# Patient Record
Sex: FEMALE | Race: WHITE | NOT HISPANIC OR LATINO
[De-identification: names, ages, dates, MRNs, and addresses within clinical notes are randomized per-mention and may not be internally consistent; named-entity substitution may affect disease eponyms.]

---

## 2017-01-05 ENCOUNTER — CHARTING TRANS (OUTPATIENT)
Dept: OTHER | Age: 47
End: 2017-01-05

## 2017-02-01 ENCOUNTER — CHARTING TRANS (OUTPATIENT)
Dept: OTHER | Age: 47
End: 2017-02-01

## 2017-03-09 ENCOUNTER — CHARTING TRANS (OUTPATIENT)
Dept: OTHER | Age: 47
End: 2017-03-09

## 2017-03-10 ENCOUNTER — CHARTING TRANS (OUTPATIENT)
Dept: OTHER | Age: 47
End: 2017-03-10

## 2017-05-18 ENCOUNTER — CHARTING TRANS (OUTPATIENT)
Dept: OTHER | Age: 47
End: 2017-05-18

## 2017-06-06 ENCOUNTER — LAB SERVICES (OUTPATIENT)
Dept: OTHER | Age: 47
End: 2017-06-06

## 2017-06-06 ENCOUNTER — CHARTING TRANS (OUTPATIENT)
Dept: OTHER | Age: 47
End: 2017-06-06

## 2017-06-06 LAB
ALBUMIN SERPL BCG-MCNC: 4.3 G/DL (ref 3.6–5.1)
ALP SERPL-CCNC: 65 U/L (ref 45–105)
ALT SERPL W/O P-5'-P-CCNC: 42 U/L (ref 7–34)
AST SERPL-CCNC: 33 U/L (ref 9–37)
BILIRUB SERPL-MCNC: 0.3 MG/DL (ref 0–1)
BUN SERPL-MCNC: 13 MG/DL (ref 7–20)
CALCIUM SERPL-MCNC: 9.4 MG/DL (ref 8.6–10.6)
CHLORIDE SERPL-SCNC: 105 MMOL/L (ref 96–107)
CHOLEST SERPL-MCNC: 177 MG/DL (ref 140–200)
CREATININE, SERUM: 0.8 MG/DL (ref 0.5–1.4)
DIFFERENTIAL TYPE: NORMAL
GFR SERPL CREATININE-BSD FRML MDRD: >60 ML/MIN/{1.73M2}
GFR SERPL CREATININE-BSD FRML MDRD: >60 ML/MIN/{1.73M2}
GLUCOSE SERPL-MCNC: 88 MG/DL (ref 70–200)
HCO3 SERPL-SCNC: 25 MMOL/L (ref 22–32)
HDLC SERPL-MCNC: 66 MG/DL
HEMATOCRIT: 37.4 % (ref 34–45)
HEMOGLOBIN: 12.8 G/DL (ref 11.2–15.7)
LDLC SERPL CALC-MCNC: 92 MG/DL (ref 0–100)
LYMPH PERCENT: 34.9 % (ref 20.5–51.1)
LYMPHOCYTE ABSOLUTE #: 1.7 10*3/UL (ref 1.2–3.4)
MEAN CORPUSCULAR HGB CONCENTRATION: 34.2 % (ref 32–36)
MEAN CORPUSCULAR HGB: 31.4 PG (ref 27–34)
MEAN CORPUSCULAR VOLUME: 91.7 FL (ref 79–95)
MEAN PLATELET VOLUME: 8.9 FL (ref 8.6–12.4)
MIXED %: 7.6 % (ref 4.3–12.9)
MIXED ABSOLUTE #: 0.4 10*3/UL (ref 0.2–0.9)
NEUTROPHIL ABSOLUTE #: 2.8 10*3/UL (ref 1.4–6.5)
NEUTROPHIL PERCENT: 57.5 % (ref 34–73.5)
PLATELET COUNT: 226 10*3/UL (ref 150–400)
POTASSIUM SERPL-SCNC: 4.1 MMOL/L (ref 3.5–5.3)
PROT SERPL-MCNC: 7.5 G/DL (ref 6.2–8.1)
RED BLOOD CELL COUNT: 4.08 10*6/UL (ref 3.7–5.2)
RED CELL DISTRIBUTION WIDTH: 13 % (ref 11.3–14.8)
SODIUM SERPL-SCNC: 141 MMOL/L (ref 136–146)
T3FREE SERPL-MCNC: 3 PG/ML (ref 2.8–5.3)
T4 FREE SERPL-MCNC: 1.13 NG/DL (ref 0.78–2.19)
TRIGL SERPL-MCNC: 93 MG/DL (ref 0–200)
TSH SERPL-ACNC: 2.15 M[IU]/L (ref 0.3–4.82)
WHITE BLOOD CELL COUNT: 4.9 10*3/UL (ref 4–10)

## 2017-06-07 ENCOUNTER — CHARTING TRANS (OUTPATIENT)
Dept: OTHER | Age: 47
End: 2017-06-07

## 2017-06-08 ENCOUNTER — CHARTING TRANS (OUTPATIENT)
Dept: OTHER | Age: 47
End: 2017-06-08

## 2017-07-03 ENCOUNTER — IMAGING SERVICES (OUTPATIENT)
Dept: OTHER | Age: 47
End: 2017-07-03

## 2017-07-11 ENCOUNTER — CHARTING TRANS (OUTPATIENT)
Dept: OTHER | Age: 47
End: 2017-07-11

## 2017-07-11 ENCOUNTER — MYAURORA ACCOUNT LINK (OUTPATIENT)
Dept: OTHER | Age: 47
End: 2017-07-11

## 2017-07-11 ASSESSMENT — PAIN SCALES - GENERAL: PAINLEVEL_OUTOF10: 8

## 2017-07-26 ENCOUNTER — CHARTING TRANS (OUTPATIENT)
Dept: OTHER | Age: 47
End: 2017-07-26

## 2017-07-31 ENCOUNTER — CHARTING TRANS (OUTPATIENT)
Dept: RHEUMATOLOGY | Age: 47
End: 2017-07-31

## 2017-07-31 ENCOUNTER — CHARTING TRANS (OUTPATIENT)
Dept: OTHER | Age: 47
End: 2017-07-31

## 2017-08-02 ENCOUNTER — LAB SERVICES (OUTPATIENT)
Dept: OTHER | Age: 47
End: 2017-08-02

## 2017-08-02 ENCOUNTER — CHARTING TRANS (OUTPATIENT)
Dept: OTHER | Age: 47
End: 2017-08-02

## 2017-08-02 LAB
25(OH)D3 SERPL-MCNC: 20.9 NG/ML (ref 30–100)
CK SERPL-CCNC: 150 U/L (ref 30–135)

## 2017-08-07 ENCOUNTER — CHARTING TRANS (OUTPATIENT)
Dept: OTHER | Age: 47
End: 2017-08-07

## 2017-08-08 ENCOUNTER — CHARTING TRANS (OUTPATIENT)
Dept: OTHER | Age: 47
End: 2017-08-08

## 2017-08-17 ENCOUNTER — CHARTING TRANS (OUTPATIENT)
Dept: OTHER | Age: 47
End: 2017-08-17

## 2017-08-30 ENCOUNTER — CHARTING TRANS (OUTPATIENT)
Dept: OTHER | Age: 47
End: 2017-08-30

## 2017-09-05 ENCOUNTER — CHARTING TRANS (OUTPATIENT)
Dept: OTHER | Age: 47
End: 2017-09-05

## 2017-09-18 ENCOUNTER — CHARTING TRANS (OUTPATIENT)
Dept: INTERNAL MEDICINE | Age: 47
End: 2017-09-18

## 2017-09-18 ENCOUNTER — IMAGING SERVICES (OUTPATIENT)
Dept: OTHER | Age: 47
End: 2017-09-18

## 2017-09-19 ENCOUNTER — CHARTING TRANS (OUTPATIENT)
Dept: OTHER | Age: 47
End: 2017-09-19

## 2017-10-03 ENCOUNTER — LAB SERVICES (OUTPATIENT)
Dept: OTHER | Age: 47
End: 2017-10-03

## 2017-10-03 ENCOUNTER — CHARTING TRANS (OUTPATIENT)
Dept: OTHER | Age: 47
End: 2017-10-03

## 2017-10-03 LAB
25(OH)D3 SERPL-MCNC: 28.9 NG/ML (ref 30–100)
CK SERPL-CCNC: 136 U/L (ref 30–135)

## 2017-10-05 ENCOUNTER — CHARTING TRANS (OUTPATIENT)
Dept: RHEUMATOLOGY | Age: 47
End: 2017-10-05

## 2017-10-05 ENCOUNTER — MYAURORA ACCOUNT LINK (OUTPATIENT)
Dept: OTHER | Age: 47
End: 2017-10-05

## 2017-10-06 ENCOUNTER — CHARTING TRANS (OUTPATIENT)
Dept: OTHER | Age: 47
End: 2017-10-06

## 2017-10-18 ENCOUNTER — CHARTING TRANS (OUTPATIENT)
Dept: OTHER | Age: 47
End: 2017-10-18

## 2017-10-25 ENCOUNTER — CHARTING TRANS (OUTPATIENT)
Dept: OTHER | Age: 47
End: 2017-10-25

## 2017-10-31 ENCOUNTER — MYAURORA ACCOUNT LINK (OUTPATIENT)
Dept: OTHER | Age: 47
End: 2017-10-31

## 2017-10-31 ENCOUNTER — LAB SERVICES (OUTPATIENT)
Dept: OTHER | Age: 47
End: 2017-10-31

## 2017-10-31 ENCOUNTER — CHARTING TRANS (OUTPATIENT)
Dept: OTHER | Age: 47
End: 2017-10-31

## 2017-10-31 LAB
ALBUMIN SERPL BCG-MCNC: 4.4 G/DL (ref 3.6–5.1)
ALBUMIN SERPL BCG-MCNC: 4.4 G/DL (ref 3.6–5.1)
ALP SERPL-CCNC: 75 U/L (ref 45–105)
ALP SERPL-CCNC: 75 U/L (ref 45–105)
ALT SERPL W/O P-5'-P-CCNC: 24 U/L (ref 7–34)
ALT SERPL W/O P-5'-P-CCNC: 24 U/L (ref 7–34)
AST SERPL-CCNC: 15 U/L (ref 9–37)
AST SERPL-CCNC: 15 U/L (ref 9–37)
BILIRUB DIRECT SERPL-MCNC: 0.1 MG/DL (ref 0–0.2)
BILIRUB SERPL-MCNC: 0.2 MG/DL (ref 0–1)
BILIRUB SERPL-MCNC: 0.2 MG/DL (ref 0–1)
BUN SERPL-MCNC: 11 MG/DL (ref 7–20)
CALCIUM SERPL-MCNC: 9.4 MG/DL (ref 8.6–10.6)
CHLORIDE SERPL-SCNC: 105 MMOL/L (ref 96–107)
CREATININE, SERUM: 0.8 MG/DL (ref 0.5–1.4)
GFR SERPL CREATININE-BSD FRML MDRD: >60 ML/MIN/{1.73M2}
GFR SERPL CREATININE-BSD FRML MDRD: >60 ML/MIN/{1.73M2}
GLUCOSE SERPL-MCNC: 96 MG/DL (ref 70–200)
HCO3 SERPL-SCNC: 25 MMOL/L (ref 22–32)
POTASSIUM SERPL-SCNC: 4.2 MMOL/L (ref 3.5–5.3)
PROT SERPL-MCNC: 6.9 G/DL (ref 6.2–8.1)
PROT SERPL-MCNC: 6.9 G/DL (ref 6.2–8.1)
SODIUM SERPL-SCNC: 140 MMOL/L (ref 136–146)

## 2017-11-01 ENCOUNTER — CHARTING TRANS (OUTPATIENT)
Dept: OTHER | Age: 47
End: 2017-11-01

## 2017-11-01 LAB — FINAL REPORT: NORMAL

## 2017-11-02 ENCOUNTER — CHARTING TRANS (OUTPATIENT)
Dept: OTHER | Age: 47
End: 2017-11-02

## 2017-12-20 ENCOUNTER — CHARTING TRANS (OUTPATIENT)
Dept: OTHER | Age: 47
End: 2017-12-20

## 2018-01-11 ENCOUNTER — IMAGING SERVICES (OUTPATIENT)
Dept: OTHER | Age: 48
End: 2018-01-11

## 2018-01-11 ENCOUNTER — CHARTING TRANS (OUTPATIENT)
Dept: OTHER | Age: 48
End: 2018-01-11

## 2018-01-11 ENCOUNTER — LAB SERVICES (OUTPATIENT)
Dept: OTHER | Age: 48
End: 2018-01-11

## 2018-01-11 LAB
ALBUMIN SERPL BCG-MCNC: 4.4 G/DL (ref 3.6–5.1)
ALP SERPL-CCNC: 75 U/L (ref 45–105)
ALT SERPL W/O P-5'-P-CCNC: 47 U/L (ref 7–34)
AST SERPL-CCNC: 32 U/L (ref 9–37)
BILIRUB SERPL-MCNC: 0.3 MG/DL (ref 0–1)
BUN SERPL-MCNC: 13 MG/DL (ref 7–20)
CALCIUM SERPL-MCNC: 9.2 MG/DL (ref 8.6–10.6)
CHLORIDE SERPL-SCNC: 103 MMOL/L (ref 96–107)
CHOLEST SERPL-MCNC: 203 MG/DL (ref 140–200)
CREATININE, SERUM: 0.7 MG/DL (ref 0.5–1.4)
DIFFERENTIAL TYPE: NORMAL
GFR SERPL CREATININE-BSD FRML MDRD: >60 ML/MIN/{1.73M2}
GFR SERPL CREATININE-BSD FRML MDRD: >60 ML/MIN/{1.73M2}
GLUCOSE SERPL-MCNC: 98 MG/DL (ref 70–200)
HCO3 SERPL-SCNC: 24 MMOL/L (ref 22–32)
HDLC SERPL-MCNC: 71 MG/DL
HEMATOCRIT: 39.7 % (ref 34–45)
HEMOGLOBIN: 13.4 G/DL (ref 11.2–15.7)
LDLC SERPL CALC-MCNC: 112 MG/DL (ref 0–100)
LYMPH PERCENT: 36.9 % (ref 20.5–51.1)
LYMPHOCYTE ABSOLUTE #: 2.1 10*3/UL (ref 1.2–3.4)
MEAN CORPUSCULAR HGB CONCENTRATION: 33.8 % (ref 32–36)
MEAN CORPUSCULAR HGB: 30.9 PG (ref 27–34)
MEAN CORPUSCULAR VOLUME: 91.5 FL (ref 79–95)
MEAN PLATELET VOLUME: 9 FL (ref 8.6–12.4)
MIXED %: 7.2 % (ref 4.3–12.9)
MIXED ABSOLUTE #: 0.4 10*3/UL (ref 0.2–0.9)
NEUTROPHIL ABSOLUTE #: 3.2 10*3/UL (ref 1.4–6.5)
NEUTROPHIL PERCENT: 55.9 % (ref 34–73.5)
PLATELET COUNT: 266 10*3/UL (ref 150–400)
POTASSIUM SERPL-SCNC: 4 MMOL/L (ref 3.5–5.3)
PROT SERPL-MCNC: 7.7 G/DL (ref 6.2–8.1)
RED BLOOD CELL COUNT: 4.34 10*6/UL (ref 3.7–5.2)
RED CELL DISTRIBUTION WIDTH: 12.7 % (ref 11.3–14.8)
SODIUM SERPL-SCNC: 139 MMOL/L (ref 136–146)
TRIGL SERPL-MCNC: 97 MG/DL (ref 0–200)
WHITE BLOOD CELL COUNT: 5.7 10*3/UL (ref 4–10)

## 2018-01-12 ENCOUNTER — CHARTING TRANS (OUTPATIENT)
Dept: OTHER | Age: 48
End: 2018-01-12

## 2018-03-06 ENCOUNTER — CHARTING TRANS (OUTPATIENT)
Dept: OTHER | Age: 48
End: 2018-03-06

## 2018-04-11 ENCOUNTER — CHARTING TRANS (OUTPATIENT)
Dept: OTHER | Age: 48
End: 2018-04-11

## 2018-06-12 ENCOUNTER — CHARTING TRANS (OUTPATIENT)
Dept: OTHER | Age: 48
End: 2018-06-12

## 2018-06-12 ENCOUNTER — MYAURORA ACCOUNT LINK (OUTPATIENT)
Dept: OTHER | Age: 48
End: 2018-06-12

## 2018-06-12 ENCOUNTER — ANCILLARY ORDERS (OUTPATIENT)
Dept: OTHER | Age: 48
End: 2018-06-12

## 2018-06-12 DIAGNOSIS — Z12.31 ENCOUNTER FOR SCREENING MAMMOGRAM FOR MALIGNANT NEOPLASM OF BREAST: ICD-10-CM

## 2018-07-05 ENCOUNTER — IMAGING SERVICES (OUTPATIENT)
Dept: OTHER | Age: 48
End: 2018-07-05

## 2018-07-09 ENCOUNTER — CHARTING TRANS (OUTPATIENT)
Dept: OTHER | Age: 48
End: 2018-07-09

## 2018-07-09 ENCOUNTER — LAB SERVICES (OUTPATIENT)
Dept: OTHER | Age: 48
End: 2018-07-09

## 2018-07-09 ENCOUNTER — ANCILLARY ORDERS (OUTPATIENT)
Dept: OTHER | Age: 48
End: 2018-07-09

## 2018-07-09 ENCOUNTER — MYAURORA ACCOUNT LINK (OUTPATIENT)
Dept: OTHER | Age: 48
End: 2018-07-09

## 2018-07-09 DIAGNOSIS — D05.11 INTRADUCTAL CARCINOMA IN SITU OF RIGHT BREAST: ICD-10-CM

## 2018-07-09 DIAGNOSIS — Z12.31 ENCOUNTER FOR SCREENING MAMMOGRAM FOR MALIGNANT NEOPLASM OF BREAST: ICD-10-CM

## 2018-07-09 LAB
ALBUMIN SERPL BCG-MCNC: 4.4 G/DL (ref 3.6–5.1)
ALP SERPL-CCNC: 79 U/L (ref 45–130)
ALT SERPL W/O P-5'-P-CCNC: 55 U/L (ref 7–34)
AST SERPL-CCNC: 30 U/L (ref 9–37)
BILIRUB SERPL-MCNC: 0.3 MG/DL (ref 0–1)
BUN SERPL-MCNC: 11 MG/DL (ref 7–20)
CALCIUM SERPL-MCNC: 9.6 MG/DL (ref 8.6–10.6)
CHLORIDE SERPL-SCNC: 104 MMOL/L (ref 96–107)
CHOLEST SERPL-MCNC: 171 MG/DL (ref 140–200)
CREAT SERPL-MCNC: 0.8 MG/DL (ref 0.5–1.4)
DIFFERENTIAL TYPE: NORMAL
GFR SERPL CREATININE-BSD FRML MDRD: >60 ML/MIN/{1.73M2}
GFR SERPL CREATININE-BSD FRML MDRD: >60 ML/MIN/{1.73M2}
GLUCOSE SERPL-MCNC: 91 MG/DL (ref 70–200)
HCO3 SERPL-SCNC: 29 MMOL/L (ref 22–32)
HDLC SERPL-MCNC: 68 MG/DL
HEMATOCRIT: 39.9 % (ref 34–45)
HEMOGLOBIN: 13.1 G/DL (ref 11.2–15.7)
LDLC SERPL CALC-MCNC: 86 MG/DL (ref 0–100)
LYMPH PERCENT: 39.1 % (ref 20.5–51.1)
LYMPHOCYTE ABSOLUTE #: 2 10*3/UL (ref 1.2–3.4)
MEAN CORPUSCULAR HGB CONCENTRATION: 32.8 % (ref 32–36)
MEAN CORPUSCULAR HGB: 30.6 PG (ref 27–34)
MEAN CORPUSCULAR VOLUME: 93.2 FL (ref 79–95)
MEAN PLATELET VOLUME: 9.2 FL (ref 8.6–12.4)
MIXED %: 7.2 % (ref 4.3–12.9)
MIXED ABSOLUTE #: 0.4 10*3/UL (ref 0.2–0.9)
NEUTROPHIL ABSOLUTE #: 2.7 10*3/UL (ref 1.4–6.5)
NEUTROPHIL PERCENT: 53.7 % (ref 34–73.5)
PLATELET COUNT: 229 10*3/UL (ref 150–400)
POTASSIUM SERPL-SCNC: 4.4 MMOL/L (ref 3.5–5.3)
PROT SERPL-MCNC: 7.1 G/DL (ref 6.2–8.1)
RED BLOOD CELL COUNT: 4.28 10*6/UL (ref 3.7–5.2)
RED CELL DISTRIBUTION WIDTH: 13.9 % (ref 11.3–14.8)
SODIUM SERPL-SCNC: 142 MMOL/L (ref 136–146)
TRIGL SERPL-MCNC: 86 MG/DL (ref 0–200)
WHITE BLOOD CELL COUNT: 5.1 10*3/UL (ref 4–10)

## 2018-07-10 ENCOUNTER — CHARTING TRANS (OUTPATIENT)
Dept: OTHER | Age: 48
End: 2018-07-10

## 2018-07-19 ENCOUNTER — IMAGING SERVICES (OUTPATIENT)
Dept: OTHER | Age: 48
End: 2018-07-19

## 2018-07-25 ENCOUNTER — LAB SERVICES (OUTPATIENT)
Dept: OTHER | Age: 48
End: 2018-07-25

## 2018-07-25 ENCOUNTER — CHARTING TRANS (OUTPATIENT)
Dept: OTHER | Age: 48
End: 2018-07-25

## 2018-07-25 LAB
ALBUMIN SERPL-MCNC: 4.7 G/DL (ref 3.6–5.1)
ALP SERPL-CCNC: 90 U/L (ref 45–130)
ALT SERPL-CCNC: 57 U/L (ref 15–43)
AST SERPL-CCNC: 40 U/L (ref 14–43)
BILIRUB SERPL-MCNC: 0.4 MG/DL (ref 0–1.3)
BUN SERPL-MCNC: 15 MG/DL (ref 7–20)
CALCIUM SERPL-MCNC: 10.3 MG/DL (ref 8.6–10.6)
CHLORIDE SERPL-SCNC: 104 MMOL/L (ref 96–107)
CO2 SERPL-SCNC: 27 MMOL/L (ref 22–32)
CREAT SERPL-MCNC: 0.8 MG/DL (ref 0.5–1.4)
DIFFERENTIAL TYPE: ABNORMAL
GFR SERPL CREATININE-BSD FRML MDRD: >60 ML/MIN/{1.73M2}
GFR SERPL CREATININE-BSD FRML MDRD: >60 ML/MIN/{1.73M2}
GLUCOSE SERPL-MCNC: 109 MG/DL (ref 70–200)
HEMATOCRIT: 44.7 % (ref 34–45)
HEMOGLOBIN: 14.4 G/DL (ref 11.2–15.7)
MEAN CORPUSCULAR HGB CONCENTRATION: 32.2 % (ref 32–36)
MEAN CORPUSCULAR HGB: 29.4 PG (ref 27–34)
MEAN CORPUSCULAR VOLUME: 91.2 FL (ref 79–95)
MEAN PLATELET VOLUME: 9.8 FL (ref 8.6–12.4)
PLATELET COUNT: 288 10*3/UL (ref 150–400)
POTASSIUM SERPL-SCNC: 4.4 MMOL/L (ref 3.5–5.3)
PROT SERPL-MCNC: 7.9 G/DL (ref 6.4–8.5)
RED BLOOD CELL COUNT: 4.9 10*6/UL (ref 3.7–5.2)
RED CELL DISTRIBUTION WIDTH: 13.9 % (ref 11.3–14.8)
SODIUM SERPL-SCNC: 146 MMOL/L (ref 136–146)
WHITE BLOOD CELL COUNT: 12 10*3/UL (ref 4–10)

## 2018-07-26 ENCOUNTER — CHARTING TRANS (OUTPATIENT)
Dept: OTHER | Age: 48
End: 2018-07-26

## 2018-07-26 ENCOUNTER — MYAURORA ACCOUNT LINK (OUTPATIENT)
Dept: OTHER | Age: 48
End: 2018-07-26

## 2018-08-03 ENCOUNTER — CHARTING TRANS (OUTPATIENT)
Dept: OTHER | Age: 48
End: 2018-08-03

## 2018-09-06 ENCOUNTER — CHARTING TRANS (OUTPATIENT)
Dept: OTHER | Age: 48
End: 2018-09-06

## 2018-09-10 ENCOUNTER — CHARTING TRANS (OUTPATIENT)
Dept: OTHER | Age: 48
End: 2018-09-10

## 2018-09-14 ENCOUNTER — LAB SERVICES (OUTPATIENT)
Dept: OTHER | Age: 48
End: 2018-09-14

## 2018-09-14 LAB — HEMOCCULT STL QL IA: NEGATIVE

## 2018-09-17 LAB
C PARVUM AG STL QL IA: NEGATIVE
G LAMBLIA AG STL QL IA: NEGATIVE

## 2018-09-18 LAB — FINAL REPORT: NORMAL

## 2018-11-23 ENCOUNTER — IMAGING SERVICES (OUTPATIENT)
Dept: OTHER | Age: 48
End: 2018-11-23

## 2018-11-28 VITALS
HEART RATE: 72 BPM | RESPIRATION RATE: 18 BRPM | WEIGHT: 216 LBS | TEMPERATURE: 98 F | OXYGEN SATURATION: 97 % | DIASTOLIC BLOOD PRESSURE: 66 MMHG | SYSTOLIC BLOOD PRESSURE: 110 MMHG | BODY MASS INDEX: 31.9 KG/M2

## 2018-11-28 VITALS
DIASTOLIC BLOOD PRESSURE: 82 MMHG | WEIGHT: 218 LBS | HEIGHT: 69 IN | SYSTOLIC BLOOD PRESSURE: 130 MMHG | HEART RATE: 75 BPM | OXYGEN SATURATION: 98 % | TEMPERATURE: 97 F | BODY MASS INDEX: 32.29 KG/M2 | RESPIRATION RATE: 18 BRPM

## 2018-11-28 VITALS
DIASTOLIC BLOOD PRESSURE: 76 MMHG | SYSTOLIC BLOOD PRESSURE: 118 MMHG | WEIGHT: 220 LBS | HEIGHT: 69 IN | BODY MASS INDEX: 32.58 KG/M2 | HEART RATE: 88 BPM | RESPIRATION RATE: 18 BRPM

## 2018-11-28 VITALS
HEIGHT: 69 IN | BODY MASS INDEX: 31.55 KG/M2 | WEIGHT: 213 LBS | RESPIRATION RATE: 18 BRPM | HEART RATE: 76 BPM | DIASTOLIC BLOOD PRESSURE: 76 MMHG | SYSTOLIC BLOOD PRESSURE: 118 MMHG

## 2018-11-28 VITALS
SYSTOLIC BLOOD PRESSURE: 120 MMHG | TEMPERATURE: 97 F | DIASTOLIC BLOOD PRESSURE: 80 MMHG | HEART RATE: 81 BPM | OXYGEN SATURATION: 99 % | RESPIRATION RATE: 16 BRPM

## 2018-12-05 RX ORDER — OMEGA-3/DHA/EPA/FISH OIL 60 MG-90MG
CAPSULE ORAL
COMMUNITY
Start: 2009-07-16 | End: 2022-02-22 | Stop reason: ALTCHOICE

## 2018-12-05 RX ORDER — ATENOLOL 25 MG/1
12.5 TABLET ORAL
COMMUNITY
Start: 2018-07-26 | End: 2018-12-06 | Stop reason: SDUPTHER

## 2018-12-05 RX ORDER — CYCLOBENZAPRINE HCL 10 MG
5 TABLET ORAL
COMMUNITY
Start: 2017-07-31 | End: 2018-12-06 | Stop reason: SDUPTHER

## 2018-12-05 RX ORDER — MULTIVIT-MIN/IRON/FOLIC ACID/K 18-600-40
CAPSULE ORAL
COMMUNITY
Start: 2017-10-13

## 2018-12-05 RX ORDER — CETIRIZINE HYDROCHLORIDE 10 MG/1
10 TABLET ORAL DAILY
COMMUNITY
Start: 2014-03-18

## 2018-12-06 ENCOUNTER — LAB SERVICES (OUTPATIENT)
Dept: LAB | Age: 48
End: 2018-12-06

## 2018-12-06 ENCOUNTER — TELEPHONE (OUTPATIENT)
Dept: GASTROENTEROLOGY | Age: 48
End: 2018-12-06

## 2018-12-06 ENCOUNTER — OFFICE VISIT (OUTPATIENT)
Dept: INTERNAL MEDICINE | Age: 48
End: 2018-12-06

## 2018-12-06 VITALS
SYSTOLIC BLOOD PRESSURE: 108 MMHG | DIASTOLIC BLOOD PRESSURE: 80 MMHG | BODY MASS INDEX: 31.31 KG/M2 | RESPIRATION RATE: 20 BRPM | WEIGHT: 212 LBS | HEART RATE: 80 BPM

## 2018-12-06 DIAGNOSIS — K90.9 DIARRHEA DUE TO MALABSORPTION: Primary | ICD-10-CM

## 2018-12-06 DIAGNOSIS — J45.20 MILD INTERMITTENT ASTHMA WITHOUT STATUS ASTHMATICUS WITHOUT COMPLICATION: ICD-10-CM

## 2018-12-06 DIAGNOSIS — J30.1 NON-SEASONAL ALLERGIC RHINITIS DUE TO POLLEN: Chronic | ICD-10-CM

## 2018-12-06 DIAGNOSIS — K90.9 DIARRHEA DUE TO MALABSORPTION: ICD-10-CM

## 2018-12-06 DIAGNOSIS — I10 ESSENTIAL HYPERTENSION: ICD-10-CM

## 2018-12-06 DIAGNOSIS — R19.7 DIARRHEA DUE TO MALABSORPTION: ICD-10-CM

## 2018-12-06 DIAGNOSIS — G57.62 MORTON'S NEUROMA OF LEFT FOOT: ICD-10-CM

## 2018-12-06 DIAGNOSIS — R04.0 EPISTAXIS: ICD-10-CM

## 2018-12-06 DIAGNOSIS — M79.10 MYALGIA: ICD-10-CM

## 2018-12-06 DIAGNOSIS — J31.0 CHRONIC RHINITIS: Chronic | ICD-10-CM

## 2018-12-06 DIAGNOSIS — D05.10 DUCTAL CARCINOMA IN SITU (DCIS) OF BREAST, UNSPECIFIED LATERALITY: ICD-10-CM

## 2018-12-06 DIAGNOSIS — N39.3 STRESS INCONTINENCE, FEMALE: ICD-10-CM

## 2018-12-06 DIAGNOSIS — R19.7 DIARRHEA DUE TO MALABSORPTION: Primary | ICD-10-CM

## 2018-12-06 LAB
25(OH)D3 SERPL-MCNC: 22.8 NG/ML (ref 30–100)
ALBUMIN SERPL-MCNC: 4.6 G/DL (ref 3.6–5.1)
ALP SERPL-CCNC: 96 U/L (ref 45–130)
ALT SERPL W/O P-5'-P-CCNC: 46 U/L (ref 4–38)
AMYLASE SERPL-CCNC: 32 U/L (ref 30–110)
AST SERPL-CCNC: 33 U/L (ref 14–43)
BASOPHIL %: 0.6 % (ref 0–1.2)
BASOPHIL ABSOLUTE #: 0 10*3/UL (ref 0–0.1)
BILIRUB SERPL-MCNC: 0.5 MG/DL (ref 0–1.3)
BILIRUBIN URINE: NEGATIVE
BLOOD URINE: NEGATIVE
BUN SERPL-MCNC: 12 MG/DL (ref 7–20)
CALCIUM SERPL-MCNC: 10.7 MG/DL (ref 8.6–10.6)
CHLORIDE SERPL-SCNC: 101 MMOL/L (ref 96–107)
CHOLEST SERPL-MCNC: 189 MG/DL (ref 140–200)
CK SERPL-CCNC: 92 U/L (ref 30–135)
CLARITY: ABNORMAL
CO2 SERPL-SCNC: 29 MMOL/L (ref 22–32)
COLOR: YELLOW
CREAT SERPL-MCNC: 0.7 MG/DL (ref 0.5–1.4)
DIFFERENTIAL TYPE: NORMAL
EOSINOPHIL %: 0.7 % (ref 0–10)
EOSINOPHIL ABSOLUTE #: 0.1 10*3/UL (ref 0–0.5)
GFR SERPL CREATININE-BSD FRML MDRD: >60 ML/MIN/{1.73M2}
GFR SERPL CREATININE-BSD FRML MDRD: >60 ML/MIN/{1.73M2}
GLUCOSE P FAST SERPL-MCNC: 88 MG/DL (ref 60–100)
GLUCOSE QUALITATIVE U: NEGATIVE
HDLC SERPL-MCNC: 74 MG/DL
HEMATOCRIT: 40.1 % (ref 34–45)
HEMOGLOBIN: 13.3 G/DL (ref 11.2–15.7)
KETONES, URINE: NEGATIVE
LDLC SERPL CALC-MCNC: 92 MG/DL (ref 30–100)
LEUKOCYTE ESTERASE URINE: NEGATIVE
LIPASE SERPL-CCNC: 81 U/L (ref 10–250)
LYMPH PERCENT: 31.9 % (ref 20.5–51.1)
LYMPHOCYTE ABSOLUTE #: 2.2 10*3/UL (ref 1.2–3.4)
MEAN CORPUSCULAR HGB CONCENTRATION: 33.2 % (ref 32–36)
MEAN CORPUSCULAR HGB: 30.7 PG (ref 27–34)
MEAN CORPUSCULAR VOLUME: 92.6 FL (ref 79–95)
MEAN PLATELET VOLUME: 10.5 FL (ref 8.6–12.4)
MONOCYTE ABSOLUTE #: 0.5 10*3/UL (ref 0.2–0.9)
MONOCYTE PERCENT: 7.2 % (ref 4.3–12.9)
MUCOUS: NORMAL
NEUTROPHIL ABSOLUTE #: 4.1 10*3/UL (ref 1.4–6.5)
NEUTROPHIL PERCENT: 59.6 % (ref 34–73.5)
NITRITE URINE: NEGATIVE
PH URINE: 5 (ref 5–7)
PLATELET COUNT: 251 10*3/UL (ref 150–400)
POTASSIUM SERPL-SCNC: 4.5 MMOL/L (ref 3.5–5.3)
PROT SERPL-MCNC: 8.2 G/DL (ref 6.4–8.5)
RED BLOOD CELL COUNT: 4.33 10*6/UL (ref 3.7–5.2)
RED BLOOD CELLS URINE: NORMAL (ref 0–3)
RED CELL DISTRIBUTION WIDTH: 13.4 % (ref 11.3–14.8)
SODIUM SERPL-SCNC: 139 MMOL/L (ref 136–146)
SPECIFIC GRAVITY URINE: 1.02 (ref 1–1.03)
SQUAMOUS EPITHELIAL CELLS: NORMAL
TRIGL SERPL-MCNC: 114 MG/DL (ref 0–200)
TSH SERPL DL<=0.05 MIU/L-ACNC: 1.52 M[IU]/L (ref 0.3–4.82)
URINE PROTEIN, QUAL (DIPSTICK): 30
UROBILINOGEN URINE: <2
WHITE BLOOD CELL COUNT: 7 10*3/UL (ref 4–10)
WHITE BLOOD CELLS URINE: NORMAL (ref 0–5)

## 2018-12-06 PROCEDURE — 99214 OFFICE O/P EST MOD 30 MIN: CPT | Performed by: INTERNAL MEDICINE

## 2018-12-06 PROCEDURE — 80061 LIPID PANEL: CPT | Performed by: INTERNAL MEDICINE

## 2018-12-06 PROCEDURE — 83690 ASSAY OF LIPASE: CPT | Performed by: INTERNAL MEDICINE

## 2018-12-06 PROCEDURE — 80050 GENERAL HEALTH PANEL: CPT | Performed by: INTERNAL MEDICINE

## 2018-12-06 PROCEDURE — 82150 ASSAY OF AMYLASE: CPT | Performed by: INTERNAL MEDICINE

## 2018-12-06 PROCEDURE — 82306 VITAMIN D 25 HYDROXY: CPT | Performed by: INTERNAL MEDICINE

## 2018-12-06 PROCEDURE — 3079F DIAST BP 80-89 MM HG: CPT | Performed by: INTERNAL MEDICINE

## 2018-12-06 PROCEDURE — 87086 URINE CULTURE/COLONY COUNT: CPT | Performed by: INTERNAL MEDICINE

## 2018-12-06 PROCEDURE — 36415 COLL VENOUS BLD VENIPUNCTURE: CPT | Performed by: INTERNAL MEDICINE

## 2018-12-06 PROCEDURE — 81001 URINALYSIS AUTO W/SCOPE: CPT | Performed by: INTERNAL MEDICINE

## 2018-12-06 PROCEDURE — 3074F SYST BP LT 130 MM HG: CPT | Performed by: INTERNAL MEDICINE

## 2018-12-06 PROCEDURE — 82550 ASSAY OF CK (CPK): CPT | Performed by: INTERNAL MEDICINE

## 2018-12-06 RX ORDER — ATENOLOL 25 MG/1
12.5 TABLET ORAL DAILY
Qty: 45 TABLET | Refills: 1 | Status: SHIPPED | OUTPATIENT
Start: 2018-12-06 | End: 2019-05-16 | Stop reason: SDUPTHER

## 2018-12-06 RX ORDER — CYCLOBENZAPRINE HCL 10 MG
2.5 TABLET ORAL
Qty: 30 TABLET | Refills: 5 | Status: SHIPPED | OUTPATIENT
Start: 2018-12-06 | End: 2020-02-12 | Stop reason: SDUPTHER

## 2018-12-06 SDOH — HEALTH STABILITY: MENTAL HEALTH: HOW OFTEN DO YOU HAVE A DRINK CONTAINING ALCOHOL?: NEVER

## 2018-12-07 LAB — FINAL REPORT: NORMAL

## 2018-12-10 ENCOUNTER — E-ADVICE (OUTPATIENT)
Dept: INTERNAL MEDICINE | Age: 48
End: 2018-12-10

## 2018-12-11 ENCOUNTER — TELEPHONE (OUTPATIENT)
Dept: INTERNAL MEDICINE | Age: 48
End: 2018-12-11

## 2018-12-11 ENCOUNTER — E-ADVICE (OUTPATIENT)
Dept: INTERNAL MEDICINE | Age: 48
End: 2018-12-11

## 2018-12-11 DIAGNOSIS — K31.89 OTHER DISEASES OF STOMACH AND DUODENUM: ICD-10-CM

## 2018-12-11 DIAGNOSIS — R19.4 CHANGE IN BOWEL HABITS: ICD-10-CM

## 2018-12-11 DIAGNOSIS — K31.7 POLYP OF STOMACH AND DUODENUM: ICD-10-CM

## 2018-12-11 DIAGNOSIS — K20.80 OTHER SPECIFIED OESOPHAGITIS: ICD-10-CM

## 2018-12-11 DIAGNOSIS — R10.13 EPIGASTRIC PAIN: ICD-10-CM

## 2018-12-11 DIAGNOSIS — E83.52 HYPERCALCEMIA: Primary | ICD-10-CM

## 2018-12-11 DIAGNOSIS — K29.60 OTHER GASTRITIS WITHOUT BLEEDING: ICD-10-CM

## 2018-12-11 DIAGNOSIS — D12.0 BENIGN NEOPLASM OF CECUM: ICD-10-CM

## 2018-12-12 ENCOUNTER — LAB SERVICES (OUTPATIENT)
Dept: LAB | Age: 48
End: 2018-12-12

## 2018-12-12 DIAGNOSIS — E83.52 HYPERCALCEMIA: Primary | ICD-10-CM

## 2018-12-12 PROCEDURE — 83970 ASSAY OF PARATHORMONE: CPT | Performed by: INTERNAL MEDICINE

## 2018-12-13 LAB
CALCIUM SERPL-MCNC: 10.3 MG/DL (ref 8.6–10.6)
PTH-INTACT SERPL-MCNC: 44.2 PG/ML (ref 23–73)

## 2019-01-28 ENCOUNTER — LAB SERVICES (OUTPATIENT)
Dept: LAB | Age: 49
End: 2019-01-28

## 2019-01-28 ENCOUNTER — OFFICE VISIT (OUTPATIENT)
Dept: GASTROENTEROLOGY | Age: 49
End: 2019-01-28

## 2019-01-28 VITALS
SYSTOLIC BLOOD PRESSURE: 112 MMHG | HEIGHT: 69 IN | WEIGHT: 212 LBS | DIASTOLIC BLOOD PRESSURE: 82 MMHG | BODY MASS INDEX: 31.4 KG/M2

## 2019-01-28 DIAGNOSIS — R79.89 ELEVATED LFTS: ICD-10-CM

## 2019-01-28 DIAGNOSIS — R10.13 EPIGASTRIC PAIN: ICD-10-CM

## 2019-01-28 DIAGNOSIS — R19.4 CHANGE IN BOWEL HABITS: Primary | ICD-10-CM

## 2019-01-28 PROCEDURE — 3079F DIAST BP 80-89 MM HG: CPT | Performed by: INTERNAL MEDICINE

## 2019-01-28 PROCEDURE — 87350 HEPATITIS BE AG IA: CPT | Performed by: INTERNAL MEDICINE

## 2019-01-28 PROCEDURE — 36415 COLL VENOUS BLD VENIPUNCTURE: CPT | Performed by: INTERNAL MEDICINE

## 2019-01-28 PROCEDURE — 3074F SYST BP LT 130 MM HG: CPT | Performed by: INTERNAL MEDICINE

## 2019-01-28 PROCEDURE — 86803 HEPATITIS C AB TEST: CPT | Performed by: INTERNAL MEDICINE

## 2019-01-28 PROCEDURE — 86704 HEP B CORE ANTIBODY TOTAL: CPT | Performed by: INTERNAL MEDICINE

## 2019-01-28 PROCEDURE — 86708 HEPATITIS A ANTIBODY: CPT | Performed by: INTERNAL MEDICINE

## 2019-01-28 PROCEDURE — 99244 OFF/OP CNSLTJ NEW/EST MOD 40: CPT | Performed by: INTERNAL MEDICINE

## 2019-01-28 PROCEDURE — 86706 HEP B SURFACE ANTIBODY: CPT | Performed by: INTERNAL MEDICINE

## 2019-01-28 RX ORDER — SIMETHICONE 125 MG
TABLET,CHEWABLE ORAL
Qty: 2 TABLET | Refills: 0 | Status: SHIPPED | OUTPATIENT
Start: 2019-01-28 | End: 2019-03-14

## 2019-01-28 RX ORDER — BISACODYL 5 MG/1
TABLET, DELAYED RELEASE ORAL
Qty: 2 TABLET | Refills: 0 | Status: SHIPPED | OUTPATIENT
Start: 2019-01-28 | End: 2019-03-14

## 2019-01-29 LAB
HAV IGG+IGM SER QL: NEGATIVE
HBV CORE IGG+IGM SER QL: NEGATIVE
HBV SURFACE AB SER QL: NEGATIVE
HCV AB SER QL: NEGATIVE

## 2019-01-30 LAB — HBV E AG SERPL QL IA: NEGATIVE

## 2019-02-05 ENCOUNTER — TELEPHONE (OUTPATIENT)
Dept: GASTROENTEROLOGY | Age: 49
End: 2019-02-05

## 2019-02-06 ENCOUNTER — APPOINTMENT (OUTPATIENT)
Dept: GASTROENTEROLOGY | Age: 49
End: 2019-02-06
Attending: INTERNAL MEDICINE

## 2019-02-06 DIAGNOSIS — R10.13 ABDOMINAL PAIN, EPIGASTRIC: Primary | ICD-10-CM

## 2019-02-06 DIAGNOSIS — R19.4 CHANGE IN BOWEL HABITS: ICD-10-CM

## 2019-02-06 LAB — PREGNANCY TEST, URINE: NEGATIVE

## 2019-02-06 PROCEDURE — 45380 COLONOSCOPY AND BIOPSY: CPT | Performed by: INTERNAL MEDICINE

## 2019-02-06 PROCEDURE — 88305 TISSUE EXAM BY PATHOLOGIST: CPT | Performed by: INTERNAL MEDICINE

## 2019-02-06 PROCEDURE — 43239 EGD BIOPSY SINGLE/MULTIPLE: CPT | Performed by: INTERNAL MEDICINE

## 2019-02-06 PROCEDURE — 45385 COLONOSCOPY W/LESION REMOVAL: CPT | Performed by: INTERNAL MEDICINE

## 2019-02-08 ENCOUNTER — E-ADVICE (OUTPATIENT)
Dept: INTERNAL MEDICINE | Age: 49
End: 2019-02-08

## 2019-02-08 LAB — AP REPORT: NORMAL

## 2019-02-11 RX ORDER — VALACYCLOVIR HYDROCHLORIDE 1 G/1
500 TABLET, FILM COATED ORAL DAILY
Qty: 90 TABLET | Refills: 1 | Status: SHIPPED | OUTPATIENT
Start: 2019-02-11 | End: 2020-07-17 | Stop reason: ALTCHOICE

## 2019-03-05 VITALS — BODY MASS INDEX: 31.7 KG/M2 | WEIGHT: 214 LBS | HEIGHT: 69 IN

## 2019-03-06 VITALS
WEIGHT: 218 LBS | SYSTOLIC BLOOD PRESSURE: 114 MMHG | DIASTOLIC BLOOD PRESSURE: 70 MMHG | HEIGHT: 69 IN | BODY MASS INDEX: 32.29 KG/M2

## 2019-03-07 ENCOUNTER — E-ADVICE (OUTPATIENT)
Dept: INTERNAL MEDICINE | Age: 49
End: 2019-03-07

## 2019-04-17 ENCOUNTER — E-ADVICE (OUTPATIENT)
Dept: OTHER | Age: 49
End: 2019-04-17

## 2019-04-30 ENCOUNTER — E-ADVICE (OUTPATIENT)
Dept: GASTROENTEROLOGY | Age: 49
End: 2019-04-30

## 2019-05-13 ENCOUNTER — E-ADVICE (OUTPATIENT)
Dept: GASTROENTEROLOGY | Age: 49
End: 2019-05-13

## 2019-05-16 ENCOUNTER — E-ADVICE (OUTPATIENT)
Dept: INTERNAL MEDICINE | Age: 49
End: 2019-05-16

## 2019-05-16 DIAGNOSIS — J31.0 CHRONIC RHINITIS: Chronic | ICD-10-CM

## 2019-05-16 DIAGNOSIS — R19.7 DIARRHEA DUE TO MALABSORPTION: ICD-10-CM

## 2019-05-16 DIAGNOSIS — K90.9 DIARRHEA DUE TO MALABSORPTION: ICD-10-CM

## 2019-05-16 DIAGNOSIS — D05.10 DUCTAL CARCINOMA IN SITU (DCIS) OF BREAST, UNSPECIFIED LATERALITY: ICD-10-CM

## 2019-05-16 DIAGNOSIS — N39.3 STRESS INCONTINENCE, FEMALE: ICD-10-CM

## 2019-05-16 DIAGNOSIS — J45.20 MILD INTERMITTENT ASTHMA WITHOUT STATUS ASTHMATICUS WITHOUT COMPLICATION: ICD-10-CM

## 2019-05-16 DIAGNOSIS — J30.1 NON-SEASONAL ALLERGIC RHINITIS DUE TO POLLEN: Chronic | ICD-10-CM

## 2019-05-16 DIAGNOSIS — I10 ESSENTIAL HYPERTENSION: ICD-10-CM

## 2019-05-16 DIAGNOSIS — R04.0 EPISTAXIS: ICD-10-CM

## 2019-05-16 DIAGNOSIS — M79.10 MYALGIA: ICD-10-CM

## 2019-05-16 DIAGNOSIS — G57.62 MORTON'S NEUROMA OF LEFT FOOT: ICD-10-CM

## 2019-05-16 RX ORDER — ATENOLOL 25 MG/1
12.5 TABLET ORAL DAILY
Qty: 45 TABLET | Refills: 0 | Status: SHIPPED | OUTPATIENT
Start: 2019-05-16 | End: 2019-05-22 | Stop reason: SDUPTHER

## 2019-05-22 RX ORDER — ATENOLOL 25 MG/1
12.5 TABLET ORAL DAILY
Qty: 180 TABLET | Refills: 1 | Status: SHIPPED | OUTPATIENT
Start: 2019-05-22 | End: 2020-02-12 | Stop reason: SDUPTHER

## 2019-06-20 ENCOUNTER — APPOINTMENT (OUTPATIENT)
Dept: OBGYN | Age: 49
End: 2019-06-20

## 2019-06-25 ENCOUNTER — APPOINTMENT (OUTPATIENT)
Dept: OBGYN | Age: 49
End: 2019-06-25

## 2019-08-21 ENCOUNTER — TELEPHONE (OUTPATIENT)
Dept: SURGERY | Age: 49
End: 2019-08-21

## 2019-08-21 DIAGNOSIS — D05.11 DUCTAL CARCINOMA IN SITU (DCIS) OF RIGHT BREAST: ICD-10-CM

## 2019-08-21 DIAGNOSIS — Z12.39 SCREENING FOR MALIGNANT NEOPLASM OF BREAST: Primary | ICD-10-CM

## 2019-08-30 ENCOUNTER — IMAGING SERVICES (OUTPATIENT)
Dept: MAMMOGRAPHY | Age: 49
End: 2019-08-30
Attending: NURSE PRACTITIONER

## 2019-08-30 ENCOUNTER — IMAGING SERVICES (OUTPATIENT)
Dept: ULTRASOUND IMAGING | Age: 49
End: 2019-08-30
Attending: NURSE PRACTITIONER

## 2019-08-30 DIAGNOSIS — D05.11 DUCTAL CARCINOMA IN SITU (DCIS) OF RIGHT BREAST: ICD-10-CM

## 2019-08-30 PROCEDURE — 77062 BREAST TOMOSYNTHESIS BI: CPT | Performed by: RADIOLOGY

## 2019-08-30 PROCEDURE — 77066 DX MAMMO INCL CAD BI: CPT | Performed by: RADIOLOGY

## 2019-08-30 PROCEDURE — 76642 ULTRASOUND BREAST LIMITED: CPT | Performed by: RADIOLOGY

## 2019-09-04 ENCOUNTER — TELEPHONE (OUTPATIENT)
Dept: OBGYN | Age: 49
End: 2019-09-04

## 2019-09-04 DIAGNOSIS — Z00.00 LABORATORY EXAM ORDERED AS PART OF ROUTINE GENERAL MEDICAL EXAMINATION: Primary | ICD-10-CM

## 2019-09-20 ENCOUNTER — E-ADVICE (OUTPATIENT)
Dept: INTERNAL MEDICINE | Age: 49
End: 2019-09-20

## 2019-09-20 ENCOUNTER — TELEPHONE (OUTPATIENT)
Dept: INTERNAL MEDICINE | Age: 49
End: 2019-09-20

## 2019-09-22 ENCOUNTER — WALK IN (OUTPATIENT)
Dept: URGENT CARE | Age: 49
End: 2019-09-22

## 2019-09-22 DIAGNOSIS — K13.70 ORAL LESION: Primary | ICD-10-CM

## 2019-09-22 PROCEDURE — 3079F DIAST BP 80-89 MM HG: CPT | Performed by: INTERNAL MEDICINE

## 2019-09-22 PROCEDURE — 99214 OFFICE O/P EST MOD 30 MIN: CPT | Performed by: INTERNAL MEDICINE

## 2019-09-22 PROCEDURE — 3075F SYST BP GE 130 - 139MM HG: CPT | Performed by: INTERNAL MEDICINE

## 2019-09-22 RX ORDER — PREDNISONE 50 MG/1
50 TABLET ORAL DAILY
Qty: 5 TABLET | Refills: 0 | Status: SHIPPED | OUTPATIENT
Start: 2019-09-22 | End: 2019-09-27

## 2019-09-22 ASSESSMENT — PAIN SCALES - GENERAL: PAINLEVEL: 0

## 2019-09-27 ENCOUNTER — E-ADVICE (OUTPATIENT)
Dept: INTERNAL MEDICINE | Age: 49
End: 2019-09-27

## 2019-10-02 ENCOUNTER — TELEPHONE (OUTPATIENT)
Dept: SURGERY | Age: 49
End: 2019-10-02

## 2019-12-03 ENCOUNTER — E-ADVICE (OUTPATIENT)
Dept: INTERNAL MEDICINE | Age: 49
End: 2019-12-03

## 2019-12-31 ENCOUNTER — APPOINTMENT (OUTPATIENT)
Dept: OBGYN | Age: 49
End: 2019-12-31

## 2020-01-10 ENCOUNTER — E-ADVICE (OUTPATIENT)
Dept: OBGYN | Age: 50
End: 2020-01-10

## 2020-01-10 DIAGNOSIS — Z13.21 ENCOUNTER FOR VITAMIN DEFICIENCY SCREENING: Primary | ICD-10-CM

## 2020-01-14 ENCOUNTER — E-ADVICE (OUTPATIENT)
Dept: OBGYN | Age: 50
End: 2020-01-14

## 2020-01-14 ENCOUNTER — LAB SERVICES (OUTPATIENT)
Dept: LAB | Age: 50
End: 2020-01-14

## 2020-01-14 DIAGNOSIS — Z13.21 ENCOUNTER FOR VITAMIN DEFICIENCY SCREENING: ICD-10-CM

## 2020-01-14 DIAGNOSIS — Z00.00 LABORATORY EXAM ORDERED AS PART OF ROUTINE GENERAL MEDICAL EXAMINATION: ICD-10-CM

## 2020-01-14 LAB
25(OH)D3 SERPL-MCNC: 27 NG/ML (ref 30–100)
ALBUMIN SERPL-MCNC: 4.5 G/DL (ref 3.6–5.1)
ALP SERPL-CCNC: 71 U/L (ref 45–130)
ALT SERPL W/O P-5'-P-CCNC: 52 U/L (ref 4–38)
AST SERPL-CCNC: 37 U/L (ref 14–43)
BASOPHIL %: 0.8 % (ref 0–1.2)
BASOPHIL ABSOLUTE #: 0 10*3/UL (ref 0–0.1)
BILIRUB SERPL-MCNC: 0.4 MG/DL (ref 0–1.3)
BUN SERPL-MCNC: 12 MG/DL (ref 7–20)
CALCIUM SERPL-MCNC: 9.6 MG/DL (ref 8.6–10.6)
CHLORIDE SERPL-SCNC: 104 MMOL/L (ref 96–107)
CHOLEST SERPL-MCNC: 166 MG/DL (ref 140–200)
CO2 SERPL-SCNC: 28 MMOL/L (ref 22–32)
CREAT SERPL-MCNC: 0.7 MG/DL (ref 0.5–1.4)
DIFFERENTIAL TYPE: NORMAL
EOSINOPHIL %: 2.9 % (ref 0–10)
EOSINOPHIL ABSOLUTE #: 0.2 10*3/UL (ref 0–0.5)
GFR SERPL CREATININE-BSD FRML MDRD: >60 ML/MIN/{1.73M2}
GFR SERPL CREATININE-BSD FRML MDRD: >60 ML/MIN/{1.73M2}
GLUCOSE P FAST SERPL-MCNC: 94 MG/DL (ref 60–100)
HDLC SERPL-MCNC: 64 MG/DL
HEMATOCRIT: 38.3 % (ref 34–45)
HEMOGLOBIN: 12.7 G/DL (ref 11.2–15.7)
LDLC SERPL CALC-MCNC: 71 MG/DL (ref 30–100)
LYMPH PERCENT: 46.9 % (ref 20.5–51.1)
LYMPHOCYTE ABSOLUTE #: 2.4 10*3/UL (ref 1.2–3.4)
MEAN CORPUSCULAR HGB CONCENTRATION: 33.2 % (ref 32–36)
MEAN CORPUSCULAR HGB: 30.2 PG (ref 27–34)
MEAN CORPUSCULAR VOLUME: 91 FL (ref 79–95)
MEAN PLATELET VOLUME: 9.7 FL (ref 8.6–12.4)
MONOCYTE ABSOLUTE #: 0.4 10*3/UL (ref 0.2–0.9)
MONOCYTE PERCENT: 8.4 % (ref 4.3–12.9)
NEUTROPHIL ABSOLUTE #: 2.1 10*3/UL (ref 1.4–6.5)
NEUTROPHIL PERCENT: 41 % (ref 34–73.5)
PLATELET COUNT: 267 10*3/UL (ref 150–400)
POTASSIUM SERPL-SCNC: 4.9 MMOL/L (ref 3.5–5.3)
PROT SERPL-MCNC: 7.7 G/DL (ref 6.4–8.5)
RED BLOOD CELL COUNT: 4.21 10*6/UL (ref 3.7–5.2)
RED CELL DISTRIBUTION WIDTH: 13.3 % (ref 11.3–14.8)
SODIUM SERPL-SCNC: 140 MMOL/L (ref 136–146)
TRIGL SERPL-MCNC: 157 MG/DL (ref 0–200)
WHITE BLOOD CELL COUNT: 5.1 10*3/UL (ref 4–10)

## 2020-01-14 PROCEDURE — 85025 COMPLETE CBC W/AUTO DIFF WBC: CPT | Performed by: OBSTETRICS & GYNECOLOGY

## 2020-01-14 PROCEDURE — 36415 COLL VENOUS BLD VENIPUNCTURE: CPT | Performed by: OBSTETRICS & GYNECOLOGY

## 2020-01-14 PROCEDURE — 80053 COMPREHEN METABOLIC PANEL: CPT | Performed by: OBSTETRICS & GYNECOLOGY

## 2020-01-14 PROCEDURE — 82306 VITAMIN D 25 HYDROXY: CPT | Performed by: OBSTETRICS & GYNECOLOGY

## 2020-01-14 PROCEDURE — 80061 LIPID PANEL: CPT | Performed by: OBSTETRICS & GYNECOLOGY

## 2020-01-20 ENCOUNTER — OFFICE VISIT (OUTPATIENT)
Dept: OBGYN | Age: 50
End: 2020-01-20

## 2020-01-20 VITALS
SYSTOLIC BLOOD PRESSURE: 122 MMHG | BODY MASS INDEX: 32.58 KG/M2 | TEMPERATURE: 97.1 F | HEIGHT: 69 IN | RESPIRATION RATE: 16 BRPM | WEIGHT: 220 LBS | DIASTOLIC BLOOD PRESSURE: 74 MMHG

## 2020-01-20 DIAGNOSIS — Z12.31 VISIT FOR SCREENING MAMMOGRAM: Primary | ICD-10-CM

## 2020-01-20 DIAGNOSIS — Z01.419 ENCOUNTER FOR ROUTINE GYNECOLOGICAL EXAMINATION WITH PAPANICOLAOU SMEAR OF CERVIX: ICD-10-CM

## 2020-01-20 DIAGNOSIS — Z11.51 SCREENING FOR HUMAN PAPILLOMAVIRUS (HPV): ICD-10-CM

## 2020-01-20 PROCEDURE — 88142 CYTOPATH C/V THIN LAYER: CPT | Performed by: PATHOLOGY

## 2020-01-20 PROCEDURE — 3078F DIAST BP <80 MM HG: CPT | Performed by: PHYSICIAN ASSISTANT

## 2020-01-20 PROCEDURE — 99396 PREV VISIT EST AGE 40-64: CPT | Performed by: PHYSICIAN ASSISTANT

## 2020-01-20 PROCEDURE — 3074F SYST BP LT 130 MM HG: CPT | Performed by: PHYSICIAN ASSISTANT

## 2020-01-20 SDOH — HEALTH STABILITY: MENTAL HEALTH: HOW OFTEN DO YOU HAVE A DRINK CONTAINING ALCOHOL?: NEVER

## 2020-01-20 ASSESSMENT — ENCOUNTER SYMPTOMS
WHEEZING: 0
SHORTNESS OF BREATH: 0
TROUBLE SWALLOWING: 0
EYE REDNESS: 0
APPETITE CHANGE: 0
EYE DISCHARGE: 0
UNEXPECTED WEIGHT CHANGE: 0
CONSTIPATION: 0
BLOOD IN STOOL: 0

## 2020-01-20 ASSESSMENT — PATIENT HEALTH QUESTIONNAIRE - PHQ9
1. LITTLE INTEREST OR PLEASURE IN DOING THINGS: NOT AT ALL
2. FEELING DOWN, DEPRESSED OR HOPELESS: NOT AT ALL
SUM OF ALL RESPONSES TO PHQ9 QUESTIONS 1 AND 2: 0
SUM OF ALL RESPONSES TO PHQ9 QUESTIONS 1 AND 2: 0

## 2020-01-21 ENCOUNTER — E-ADVICE (OUTPATIENT)
Dept: OBGYN | Age: 50
End: 2020-01-21

## 2020-01-23 ENCOUNTER — E-ADVICE (OUTPATIENT)
Dept: INTERNAL MEDICINE | Age: 50
End: 2020-01-23

## 2020-01-27 LAB — AP REPORT: NORMAL

## 2020-01-29 LAB — HPV I/H RISK 4 DNA CVX QL NAA+PROBE: NORMAL

## 2020-02-12 ENCOUNTER — E-ADVICE (OUTPATIENT)
Dept: INTERNAL MEDICINE | Age: 50
End: 2020-02-12

## 2020-02-12 ENCOUNTER — LAB SERVICES (OUTPATIENT)
Dept: LAB | Age: 50
End: 2020-02-12

## 2020-02-12 ENCOUNTER — OFFICE VISIT (OUTPATIENT)
Dept: INTERNAL MEDICINE | Age: 50
End: 2020-02-12

## 2020-02-12 VITALS
HEIGHT: 69 IN | DIASTOLIC BLOOD PRESSURE: 82 MMHG | BODY MASS INDEX: 32.44 KG/M2 | WEIGHT: 219 LBS | SYSTOLIC BLOOD PRESSURE: 122 MMHG | RESPIRATION RATE: 16 BRPM | HEART RATE: 78 BPM

## 2020-02-12 DIAGNOSIS — R04.0 EPISTAXIS: ICD-10-CM

## 2020-02-12 DIAGNOSIS — I10 ESSENTIAL HYPERTENSION: ICD-10-CM

## 2020-02-12 DIAGNOSIS — J45.20 MILD INTERMITTENT ASTHMA WITHOUT STATUS ASTHMATICUS WITHOUT COMPLICATION: Primary | ICD-10-CM

## 2020-02-12 DIAGNOSIS — J30.1 NON-SEASONAL ALLERGIC RHINITIS DUE TO POLLEN: Chronic | ICD-10-CM

## 2020-02-12 DIAGNOSIS — J30.5 ALLERGIC RHINITIS DUE TO FOOD: ICD-10-CM

## 2020-02-12 DIAGNOSIS — D05.10 DUCTAL CARCINOMA IN SITU (DCIS) OF BREAST, UNSPECIFIED LATERALITY: ICD-10-CM

## 2020-02-12 DIAGNOSIS — K90.9 DIARRHEA DUE TO MALABSORPTION: ICD-10-CM

## 2020-02-12 DIAGNOSIS — R53.83 FATIGUE, UNSPECIFIED TYPE: ICD-10-CM

## 2020-02-12 DIAGNOSIS — D05.11 DUCTAL CARCINOMA IN SITU (DCIS) OF RIGHT BREAST: ICD-10-CM

## 2020-02-12 DIAGNOSIS — N39.3 STRESS INCONTINENCE, FEMALE: ICD-10-CM

## 2020-02-12 DIAGNOSIS — R19.7 DIARRHEA DUE TO MALABSORPTION: ICD-10-CM

## 2020-02-12 DIAGNOSIS — H93.13 TINNITUS OF BOTH EARS: ICD-10-CM

## 2020-02-12 DIAGNOSIS — L60.3 NAIL DYSTROPHY: ICD-10-CM

## 2020-02-12 DIAGNOSIS — M79.10 MYALGIA: ICD-10-CM

## 2020-02-12 DIAGNOSIS — Z23 NEED FOR TD VACCINE: ICD-10-CM

## 2020-02-12 DIAGNOSIS — H91.93 BILATERAL HEARING LOSS, UNSPECIFIED HEARING LOSS TYPE: ICD-10-CM

## 2020-02-12 DIAGNOSIS — J31.0 CHRONIC RHINITIS: Chronic | ICD-10-CM

## 2020-02-12 DIAGNOSIS — J45.20 MILD INTERMITTENT ASTHMA WITHOUT STATUS ASTHMATICUS WITHOUT COMPLICATION: ICD-10-CM

## 2020-02-12 DIAGNOSIS — G57.62 MORTON'S NEUROMA OF LEFT FOOT: ICD-10-CM

## 2020-02-12 DIAGNOSIS — E55.9 VITAMIN D DEFICIENCY: ICD-10-CM

## 2020-02-12 DIAGNOSIS — K76.0 FATTY LIVER: ICD-10-CM

## 2020-02-12 PROCEDURE — 90714 TD VACC NO PRESV 7 YRS+ IM: CPT

## 2020-02-12 PROCEDURE — 99396 PREV VISIT EST AGE 40-64: CPT | Performed by: INTERNAL MEDICINE

## 2020-02-12 PROCEDURE — 90471 IMMUNIZATION ADMIN: CPT

## 2020-02-12 PROCEDURE — 3074F SYST BP LT 130 MM HG: CPT | Performed by: INTERNAL MEDICINE

## 2020-02-12 PROCEDURE — 3079F DIAST BP 80-89 MM HG: CPT | Performed by: INTERNAL MEDICINE

## 2020-02-12 RX ORDER — ERGOCALCIFEROL 1.25 MG/1
1.25 CAPSULE ORAL
Qty: 12 CAPSULE | Refills: 0 | Status: SHIPPED | OUTPATIENT
Start: 2020-02-17 | End: 2020-05-13 | Stop reason: ALTCHOICE

## 2020-02-12 RX ORDER — ATENOLOL 25 MG/1
12.5 TABLET ORAL DAILY
Qty: 180 TABLET | Refills: 1 | Status: SHIPPED | OUTPATIENT
Start: 2020-02-12 | End: 2020-08-13 | Stop reason: SDUPTHER

## 2020-02-12 SDOH — HEALTH STABILITY: MENTAL HEALTH: HOW OFTEN DO YOU HAVE A DRINK CONTAINING ALCOHOL?: NEVER

## 2020-02-12 ASSESSMENT — PATIENT HEALTH QUESTIONNAIRE - PHQ9
SUM OF ALL RESPONSES TO PHQ9 QUESTIONS 1 AND 2: 0
2. FEELING DOWN, DEPRESSED OR HOPELESS: NOT AT ALL
SUM OF ALL RESPONSES TO PHQ9 QUESTIONS 1 AND 2: 0
1. LITTLE INTEREST OR PLEASURE IN DOING THINGS: NOT AT ALL

## 2020-02-13 RX ORDER — CYCLOBENZAPRINE HCL 10 MG
2.5 TABLET ORAL
Qty: 30 TABLET | Refills: 5 | Status: SHIPPED | OUTPATIENT
Start: 2020-02-13 | End: 2020-08-13 | Stop reason: SDUPTHER

## 2020-02-17 ENCOUNTER — TELEPHONE (OUTPATIENT)
Dept: INTERNAL MEDICINE | Age: 50
End: 2020-02-17

## 2020-02-19 ENCOUNTER — E-ADVICE (OUTPATIENT)
Dept: INTERNAL MEDICINE | Age: 50
End: 2020-02-19

## 2020-02-25 ENCOUNTER — LAB SERVICES (OUTPATIENT)
Dept: LAB | Age: 50
End: 2020-02-25

## 2020-02-25 ENCOUNTER — EXTERNAL RECORD (OUTPATIENT)
Dept: HEALTH INFORMATION MANAGEMENT | Facility: OTHER | Age: 50
End: 2020-02-25

## 2020-02-25 DIAGNOSIS — R53.83 FATIGUE, UNSPECIFIED TYPE: ICD-10-CM

## 2020-02-25 DIAGNOSIS — I10 ESSENTIAL HYPERTENSION: Primary | ICD-10-CM

## 2020-02-25 DIAGNOSIS — Z84.1 FAMILY HISTORY OF CKD (CHRONIC KIDNEY DISEASE): ICD-10-CM

## 2020-02-25 DIAGNOSIS — N18.9 CHRONIC KIDNEY DISEASE, UNSPECIFIED CKD STAGE: ICD-10-CM

## 2020-02-25 LAB — TSH SERPL DL<=0.05 MIU/L-ACNC: 2.16 M[IU]/L (ref 0.3–4.82)

## 2020-02-25 PROCEDURE — 36415 COLL VENOUS BLD VENIPUNCTURE: CPT | Performed by: INTERNAL MEDICINE

## 2020-02-25 PROCEDURE — 84443 ASSAY THYROID STIM HORMONE: CPT | Performed by: INTERNAL MEDICINE

## 2020-02-25 PROCEDURE — 99000 SPECIMEN HANDLING OFFICE-LAB: CPT | Performed by: PATHOLOGY

## 2020-03-03 ENCOUNTER — OFFICE VISIT (OUTPATIENT)
Dept: AUDIOLOGY | Age: 50
End: 2020-03-03
Attending: INTERNAL MEDICINE

## 2020-03-03 DIAGNOSIS — H90.3 SENSORINEURAL HEARING LOSS (SNHL) OF BOTH EARS: Primary | ICD-10-CM

## 2020-03-03 PROCEDURE — 92557 COMPREHENSIVE HEARING TEST: CPT | Performed by: AUDIOLOGIST

## 2020-03-11 ENCOUNTER — TELEPHONE (OUTPATIENT)
Dept: INTERNAL MEDICINE | Age: 50
End: 2020-03-11

## 2020-03-11 ENCOUNTER — E-ADVICE (OUTPATIENT)
Dept: INTERNAL MEDICINE | Age: 50
End: 2020-03-11

## 2020-03-19 ENCOUNTER — TELEPHONE (OUTPATIENT)
Dept: INTERNAL MEDICINE | Age: 50
End: 2020-03-19

## 2020-03-19 ENCOUNTER — TELEPHONE (OUTPATIENT)
Dept: SCHEDULING | Age: 50
End: 2020-03-19

## 2020-03-19 ENCOUNTER — E-ADVICE (OUTPATIENT)
Dept: INTERNAL MEDICINE | Age: 50
End: 2020-03-19

## 2020-03-20 ENCOUNTER — E-ADVICE (OUTPATIENT)
Dept: AUDIOLOGY | Age: 50
End: 2020-03-20

## 2020-03-20 ENCOUNTER — OFFICE VISIT (OUTPATIENT)
Dept: INTERNAL MEDICINE | Age: 50
End: 2020-03-20

## 2020-03-20 DIAGNOSIS — G89.29 CHRONIC BILATERAL LOW BACK PAIN WITHOUT SCIATICA: Primary | ICD-10-CM

## 2020-03-20 DIAGNOSIS — M54.50 CHRONIC BILATERAL LOW BACK PAIN WITHOUT SCIATICA: Primary | ICD-10-CM

## 2020-03-20 PROCEDURE — 99213 OFFICE O/P EST LOW 20 MIN: CPT | Performed by: INTERNAL MEDICINE

## 2020-03-20 RX ORDER — PREDNISONE 10 MG/1
TABLET ORAL
Qty: 18 TABLET | Refills: 0 | Status: SHIPPED | OUTPATIENT
Start: 2020-03-20 | End: 2020-07-17 | Stop reason: ALTCHOICE

## 2020-03-27 ENCOUNTER — E-ADVICE (OUTPATIENT)
Dept: SURGERY | Age: 50
End: 2020-03-27

## 2020-04-13 DIAGNOSIS — N18.9 CHRONIC KIDNEY DISEASE, UNSPECIFIED CKD STAGE: ICD-10-CM

## 2020-04-13 DIAGNOSIS — Z84.1 FAMILY HISTORY OF CKD (CHRONIC KIDNEY DISEASE): ICD-10-CM

## 2020-04-13 DIAGNOSIS — Z84.1 FAMILY HISTORY OF CKD (CHRONIC KIDNEY DISEASE): Primary | ICD-10-CM

## 2020-04-14 LAB — SEND OUT RESULTS: NORMAL

## 2020-04-17 ENCOUNTER — APPOINTMENT (OUTPATIENT)
Dept: DERMATOLOGY | Age: 50
End: 2020-04-17

## 2020-05-09 ENCOUNTER — E-ADVICE (OUTPATIENT)
Dept: INTERNAL MEDICINE | Age: 50
End: 2020-05-09

## 2020-05-11 ENCOUNTER — LAB SERVICES (OUTPATIENT)
Dept: LAB | Age: 50
End: 2020-05-11

## 2020-05-11 DIAGNOSIS — E55.9 VITAMIN D DEFICIENCY: ICD-10-CM

## 2020-05-11 DIAGNOSIS — G57.62 MORTON'S NEUROMA OF LEFT FOOT: ICD-10-CM

## 2020-05-11 DIAGNOSIS — J30.5 ALLERGIC RHINITIS DUE TO FOOD: ICD-10-CM

## 2020-05-11 DIAGNOSIS — R04.0 EPISTAXIS: ICD-10-CM

## 2020-05-11 DIAGNOSIS — D05.11 DUCTAL CARCINOMA IN SITU (DCIS) OF RIGHT BREAST: ICD-10-CM

## 2020-05-11 DIAGNOSIS — N39.3 STRESS INCONTINENCE, FEMALE: ICD-10-CM

## 2020-05-11 DIAGNOSIS — R19.7 DIARRHEA DUE TO MALABSORPTION: ICD-10-CM

## 2020-05-11 DIAGNOSIS — D05.10 DUCTAL CARCINOMA IN SITU (DCIS) OF BREAST, UNSPECIFIED LATERALITY: ICD-10-CM

## 2020-05-11 DIAGNOSIS — I10 ESSENTIAL HYPERTENSION: ICD-10-CM

## 2020-05-11 DIAGNOSIS — J45.20 MILD INTERMITTENT ASTHMA WITHOUT STATUS ASTHMATICUS WITHOUT COMPLICATION: ICD-10-CM

## 2020-05-11 DIAGNOSIS — J31.0 CHRONIC RHINITIS: Chronic | ICD-10-CM

## 2020-05-11 DIAGNOSIS — M79.10 MYALGIA: ICD-10-CM

## 2020-05-11 DIAGNOSIS — K90.9 DIARRHEA DUE TO MALABSORPTION: ICD-10-CM

## 2020-05-11 DIAGNOSIS — J30.1 NON-SEASONAL ALLERGIC RHINITIS DUE TO POLLEN: Chronic | ICD-10-CM

## 2020-05-11 LAB — 25(OH)D3 SERPL-MCNC: 39.2 NG/ML (ref 30–100)

## 2020-05-11 PROCEDURE — 82306 VITAMIN D 25 HYDROXY: CPT | Performed by: INTERNAL MEDICINE

## 2020-05-11 PROCEDURE — 36415 COLL VENOUS BLD VENIPUNCTURE: CPT | Performed by: INTERNAL MEDICINE

## 2020-05-13 ENCOUNTER — E-ADVICE (OUTPATIENT)
Dept: INTERNAL MEDICINE | Age: 50
End: 2020-05-13

## 2020-05-13 ENCOUNTER — DOCUMENTATION (OUTPATIENT)
Dept: INTERNAL MEDICINE | Age: 50
End: 2020-05-13

## 2020-05-19 ENCOUNTER — TELEPHONE (OUTPATIENT)
Dept: NEPHROLOGY | Age: 50
End: 2020-05-19

## 2020-05-19 DIAGNOSIS — R80.9 PROTEINURIA, UNSPECIFIED TYPE: Primary | ICD-10-CM

## 2020-05-26 ENCOUNTER — NURSE ONLY (OUTPATIENT)
Dept: LAB | Age: 50
End: 2020-05-26

## 2020-05-26 DIAGNOSIS — R80.9 PROTEINURIA, UNSPECIFIED TYPE: Primary | ICD-10-CM

## 2020-05-26 DIAGNOSIS — R80.9 PROTEINURIA, UNSPECIFIED TYPE: ICD-10-CM

## 2020-05-26 LAB
BILIRUBIN URINE: NEGATIVE
BLOOD URINE: ABNORMAL
CLARITY: CLEAR
COLOR: YELLOW
CREAT UR-MCNC: 25.6 MG/DL (ref 30–125)
GLUCOSE QUALITATIVE U: NEGATIVE
KETONES, URINE: NEGATIVE
LEUKOCYTE ESTERASE URINE: NEGATIVE
NITRITE URINE: NEGATIVE
PH URINE: 7 (ref 5–7)
PROT UR-MCNC: 13 MG/DL (ref 0–12)
PROT/CREAT 24H UR: 0.51 MG/MG (ref 0–0.2)
RED BLOOD CELLS URINE: ABNORMAL (ref 0–3)
SPECIFIC GRAVITY URINE: <=1.005 (ref 1–1.03)
SQUAMOUS EPITHELIAL CELLS: ABNORMAL
URINE PROTEIN, QUAL (DIPSTICK): NEGATIVE
UROBILINOGEN URINE: 0.2
WHITE BLOOD CELLS URINE: ABNORMAL (ref 0–5)

## 2020-05-26 PROCEDURE — 82570 ASSAY OF URINE CREATININE: CPT | Performed by: INTERNAL MEDICINE

## 2020-05-26 PROCEDURE — 84156 ASSAY OF PROTEIN URINE: CPT | Performed by: INTERNAL MEDICINE

## 2020-05-26 PROCEDURE — 81003 URINALYSIS AUTO W/O SCOPE: CPT | Performed by: INTERNAL MEDICINE

## 2020-06-01 ENCOUNTER — TELEPHONE (OUTPATIENT)
Dept: NEPHROLOGY | Age: 50
End: 2020-06-01

## 2020-06-19 ENCOUNTER — APPOINTMENT (OUTPATIENT)
Dept: DERMATOLOGY | Age: 50
End: 2020-06-19

## 2020-07-17 ENCOUNTER — OFFICE VISIT (OUTPATIENT)
Dept: NEPHROLOGY | Age: 50
End: 2020-07-17

## 2020-07-17 DIAGNOSIS — R31.29 MICROSCOPIC HEMATURIA: ICD-10-CM

## 2020-07-17 DIAGNOSIS — I10 ESSENTIAL HYPERTENSION: ICD-10-CM

## 2020-07-17 DIAGNOSIS — R80.9 PROTEINURIA, UNSPECIFIED TYPE: ICD-10-CM

## 2020-07-17 DIAGNOSIS — Q87.81 ALPORT'S SYNDROME: Primary | ICD-10-CM

## 2020-07-17 PROCEDURE — 99213 OFFICE O/P EST LOW 20 MIN: CPT | Performed by: INTERNAL MEDICINE

## 2020-07-27 ENCOUNTER — E-ADVICE (OUTPATIENT)
Dept: INTERNAL MEDICINE | Age: 50
End: 2020-07-27

## 2020-07-28 ENCOUNTER — E-ADVICE (OUTPATIENT)
Dept: DERMATOLOGY | Age: 50
End: 2020-07-28

## 2020-07-28 ENCOUNTER — TELEPHONE (OUTPATIENT)
Dept: DERMATOLOGY | Age: 50
End: 2020-07-28

## 2020-07-28 ENCOUNTER — OFFICE VISIT (OUTPATIENT)
Dept: DERMATOLOGY | Age: 50
End: 2020-07-28

## 2020-07-28 DIAGNOSIS — M67.449 DIGITAL MUCOUS CYST: ICD-10-CM

## 2020-07-28 DIAGNOSIS — Z12.83 SKIN CANCER SCREENING: ICD-10-CM

## 2020-07-28 DIAGNOSIS — D22.9 SUSPICIOUS NEVUS: ICD-10-CM

## 2020-07-28 DIAGNOSIS — M67.449 DIGITAL MUCOUS CYST: Primary | ICD-10-CM

## 2020-07-28 DIAGNOSIS — L28.0 LICHEN SIMPLEX CHRONICUS: ICD-10-CM

## 2020-07-28 DIAGNOSIS — L72.0 MILIUM: Primary | ICD-10-CM

## 2020-07-28 PROCEDURE — 99202 OFFICE O/P NEW SF 15 MIN: CPT | Performed by: DERMATOLOGY

## 2020-07-28 PROCEDURE — 11301 SHAVE SKIN LESION 0.6-1.0 CM: CPT | Performed by: DERMATOLOGY

## 2020-07-30 LAB — PATH REPORT PLASRBC-IMP: NORMAL

## 2020-08-09 ENCOUNTER — E-ADVICE (OUTPATIENT)
Dept: INTERNAL MEDICINE | Age: 50
End: 2020-08-09

## 2020-08-11 ENCOUNTER — E-ADVICE (OUTPATIENT)
Dept: INTERNAL MEDICINE | Age: 50
End: 2020-08-11

## 2020-08-11 ENCOUNTER — TELEPHONE (OUTPATIENT)
Dept: FAMILY MEDICINE | Age: 50
End: 2020-08-11

## 2020-08-11 DIAGNOSIS — Z01.812 ENCOUNTER FOR SCREENING LABORATORY TESTING FOR COVID-19 VIRUS IN ASYMPTOMATIC PATIENT: Primary | ICD-10-CM

## 2020-08-11 DIAGNOSIS — Z11.52 ENCOUNTER FOR SCREENING LABORATORY TESTING FOR COVID-19 VIRUS IN ASYMPTOMATIC PATIENT: Primary | ICD-10-CM

## 2020-08-11 DIAGNOSIS — Z01.84 IMMUNITY STATUS TESTING: Primary | ICD-10-CM

## 2020-08-12 ENCOUNTER — LAB SERVICES (OUTPATIENT)
Dept: LAB | Age: 50
End: 2020-08-12

## 2020-08-12 DIAGNOSIS — K90.9 DIARRHEA DUE TO MALABSORPTION: ICD-10-CM

## 2020-08-12 DIAGNOSIS — R80.9 PROTEINURIA, UNSPECIFIED TYPE: ICD-10-CM

## 2020-08-12 DIAGNOSIS — J45.20 MILD INTERMITTENT ASTHMA WITHOUT STATUS ASTHMATICUS WITHOUT COMPLICATION: ICD-10-CM

## 2020-08-12 DIAGNOSIS — E55.9 VITAMIN D DEFICIENCY: ICD-10-CM

## 2020-08-12 DIAGNOSIS — Z01.812 ENCOUNTER FOR SCREENING LABORATORY TESTING FOR COVID-19 VIRUS IN ASYMPTOMATIC PATIENT: ICD-10-CM

## 2020-08-12 DIAGNOSIS — Q87.81 ALPORT'S SYNDROME: ICD-10-CM

## 2020-08-12 DIAGNOSIS — R19.7 DIARRHEA DUE TO MALABSORPTION: ICD-10-CM

## 2020-08-12 DIAGNOSIS — Z11.52 ENCOUNTER FOR SCREENING LABORATORY TESTING FOR COVID-19 VIRUS IN ASYMPTOMATIC PATIENT: ICD-10-CM

## 2020-08-12 DIAGNOSIS — J30.1 NON-SEASONAL ALLERGIC RHINITIS DUE TO POLLEN: Chronic | ICD-10-CM

## 2020-08-12 DIAGNOSIS — J30.5 ALLERGIC RHINITIS DUE TO FOOD: ICD-10-CM

## 2020-08-12 DIAGNOSIS — N39.3 STRESS INCONTINENCE, FEMALE: ICD-10-CM

## 2020-08-12 DIAGNOSIS — R04.0 EPISTAXIS: ICD-10-CM

## 2020-08-12 DIAGNOSIS — D05.11 DUCTAL CARCINOMA IN SITU (DCIS) OF RIGHT BREAST: ICD-10-CM

## 2020-08-12 DIAGNOSIS — D05.10 DUCTAL CARCINOMA IN SITU (DCIS) OF BREAST, UNSPECIFIED LATERALITY: ICD-10-CM

## 2020-08-12 DIAGNOSIS — J31.0 CHRONIC RHINITIS: Chronic | ICD-10-CM

## 2020-08-12 DIAGNOSIS — I10 ESSENTIAL HYPERTENSION: ICD-10-CM

## 2020-08-12 DIAGNOSIS — G57.62 MORTON'S NEUROMA OF LEFT FOOT: ICD-10-CM

## 2020-08-12 DIAGNOSIS — M79.10 MYALGIA: ICD-10-CM

## 2020-08-12 LAB
25(OH)D3 SERPL-MCNC: 36.6 NG/ML (ref 30–100)
ALBUMIN SERPL-MCNC: 4.7 G/DL (ref 3.6–5.1)
ALP SERPL-CCNC: 97 U/L (ref 45–130)
ALT SERPL W/O P-5'-P-CCNC: 47 U/L (ref 4–38)
AST SERPL-CCNC: 44 U/L (ref 14–43)
BASOPHIL %: 0.6 % (ref 0–1.2)
BASOPHIL ABSOLUTE #: 0 10*3/UL (ref 0–0.1)
BILIRUB SERPL-MCNC: 0.5 MG/DL (ref 0–1.3)
BUN SERPL-MCNC: 16 MG/DL (ref 7–20)
CALCIUM SERPL-MCNC: 10.1 MG/DL (ref 8.6–10.6)
CHLORIDE SERPL-SCNC: 103 MMOL/L (ref 96–107)
CHOLEST SERPL-MCNC: 185 MG/DL (ref 140–200)
CO2 SERPL-SCNC: 29 MMOL/L (ref 22–32)
CREAT SERPL-MCNC: 0.8 MG/DL (ref 0.5–1.4)
CREAT UR-MCNC: 152.7 MG/DL (ref 30–125)
DIFFERENTIAL TYPE: NORMAL
EOSINOPHIL %: 1.5 % (ref 0–10)
EOSINOPHIL ABSOLUTE #: 0.1 10*3/UL (ref 0–0.5)
GFR SERPL CREATININE-BSD FRML MDRD: >60 ML/MIN/{1.73M2}
GFR SERPL CREATININE-BSD FRML MDRD: >60 ML/MIN/{1.73M2}
GLUCOSE P FAST SERPL-MCNC: 103 MG/DL (ref 60–100)
HDLC SERPL-MCNC: 74 MG/DL
HEMATOCRIT: 42.2 % (ref 34–45)
HEMOGLOBIN: 13.5 G/DL (ref 11.2–15.7)
IMMATURE GRANULOCYTE ABSOLUTE: 0.01 10*3/UL (ref 0–0.05)
IMMATURE GRANULOCYTE PERCENT: 0.2 % (ref 0–0.5)
LDLC SERPL CALC-MCNC: 94 MG/DL (ref 30–100)
LYMPH PERCENT: 41.4 % (ref 20.5–51.1)
LYMPHOCYTE ABSOLUTE #: 2.2 10*3/UL (ref 1.2–3.4)
MEAN CORPUSCULAR HGB CONCENTRATION: 32 % (ref 32–36)
MEAN CORPUSCULAR HGB: 29.8 PG (ref 27–34)
MEAN CORPUSCULAR VOLUME: 93.2 FL (ref 79–95)
MEAN PLATELET VOLUME: 10.3 FL (ref 8.6–12.4)
MONOCYTE ABSOLUTE #: 0.4 10*3/UL (ref 0.2–0.9)
MONOCYTE PERCENT: 7.3 % (ref 4.3–12.9)
NEUTROPHIL ABSOLUTE #: 2.6 10*3/UL (ref 1.4–6.5)
NEUTROPHIL PERCENT: 49 % (ref 34–73.5)
PLATELET COUNT: 258 10*3/UL (ref 150–400)
POTASSIUM SERPL-SCNC: 4.4 MMOL/L (ref 3.5–5.3)
PROT SERPL-MCNC: 7.8 G/DL (ref 6.4–8.5)
PROT UR-MCNC: 11 MG/DL (ref 0–12)
PROT/CREAT 24H UR: 0.07 MG/MG (ref 0–0.2)
RED BLOOD CELL COUNT: 4.53 10*6/UL (ref 3.7–5.2)
RED CELL DISTRIBUTION WIDTH: 13.4 % (ref 11.3–14.8)
SODIUM SERPL-SCNC: 139 MMOL/L (ref 136–146)
TRIGL SERPL-MCNC: 86 MG/DL (ref 0–200)
WHITE BLOOD CELL COUNT: 5.4 10*3/UL (ref 4–10)

## 2020-08-12 PROCEDURE — 84156 ASSAY OF PROTEIN URINE: CPT | Performed by: INTERNAL MEDICINE

## 2020-08-12 PROCEDURE — 80061 LIPID PANEL: CPT | Performed by: INTERNAL MEDICINE

## 2020-08-12 PROCEDURE — 82570 ASSAY OF URINE CREATININE: CPT | Performed by: INTERNAL MEDICINE

## 2020-08-12 PROCEDURE — 80053 COMPREHEN METABOLIC PANEL: CPT | Performed by: INTERNAL MEDICINE

## 2020-08-12 PROCEDURE — 36415 COLL VENOUS BLD VENIPUNCTURE: CPT | Performed by: INTERNAL MEDICINE

## 2020-08-12 PROCEDURE — 86769 SARS-COV-2 COVID-19 ANTIBODY: CPT | Performed by: INTERNAL MEDICINE

## 2020-08-12 PROCEDURE — 85025 COMPLETE CBC W/AUTO DIFF WBC: CPT | Performed by: INTERNAL MEDICINE

## 2020-08-12 PROCEDURE — 82306 VITAMIN D 25 HYDROXY: CPT | Performed by: INTERNAL MEDICINE

## 2020-08-13 ENCOUNTER — LAB SERVICES (OUTPATIENT)
Dept: LAB | Age: 50
End: 2020-08-13

## 2020-08-13 ENCOUNTER — OFFICE VISIT (OUTPATIENT)
Dept: INTERNAL MEDICINE | Age: 50
End: 2020-08-13

## 2020-08-13 VITALS
RESPIRATION RATE: 18 BRPM | DIASTOLIC BLOOD PRESSURE: 82 MMHG | HEART RATE: 84 BPM | WEIGHT: 209 LBS | BODY MASS INDEX: 30.96 KG/M2 | SYSTOLIC BLOOD PRESSURE: 124 MMHG | HEIGHT: 69 IN

## 2020-08-13 DIAGNOSIS — M79.10 MYALGIA: ICD-10-CM

## 2020-08-13 DIAGNOSIS — R19.7 DIARRHEA DUE TO MALABSORPTION: ICD-10-CM

## 2020-08-13 DIAGNOSIS — K90.9 DIARRHEA DUE TO MALABSORPTION: ICD-10-CM

## 2020-08-13 DIAGNOSIS — J30.1 NON-SEASONAL ALLERGIC RHINITIS DUE TO POLLEN: Chronic | ICD-10-CM

## 2020-08-13 DIAGNOSIS — R04.0 EPISTAXIS: ICD-10-CM

## 2020-08-13 DIAGNOSIS — G57.62 MORTON'S NEUROMA OF LEFT FOOT: ICD-10-CM

## 2020-08-13 DIAGNOSIS — M19.042 PRIMARY OSTEOARTHRITIS OF BOTH HANDS: ICD-10-CM

## 2020-08-13 DIAGNOSIS — D05.11 DUCTAL CARCINOMA IN SITU (DCIS) OF RIGHT BREAST: ICD-10-CM

## 2020-08-13 DIAGNOSIS — E55.9 VITAMIN D DEFICIENCY: ICD-10-CM

## 2020-08-13 DIAGNOSIS — M19.041 PRIMARY OSTEOARTHRITIS OF BOTH HANDS: ICD-10-CM

## 2020-08-13 DIAGNOSIS — I10 ESSENTIAL HYPERTENSION: Primary | ICD-10-CM

## 2020-08-13 DIAGNOSIS — M72.2 PLANTAR FASCIITIS: ICD-10-CM

## 2020-08-13 DIAGNOSIS — R80.9 PROTEINURIA, UNSPECIFIED TYPE: ICD-10-CM

## 2020-08-13 DIAGNOSIS — N39.3 STRESS INCONTINENCE, FEMALE: ICD-10-CM

## 2020-08-13 DIAGNOSIS — J30.5 ALLERGIC RHINITIS DUE TO FOOD: ICD-10-CM

## 2020-08-13 DIAGNOSIS — R80.9 PROTEINURIA, UNSPECIFIED TYPE: Primary | ICD-10-CM

## 2020-08-13 DIAGNOSIS — J31.0 CHRONIC RHINITIS: Chronic | ICD-10-CM

## 2020-08-13 DIAGNOSIS — Q87.81 ALPORT'S SYNDROME: ICD-10-CM

## 2020-08-13 DIAGNOSIS — I10 ESSENTIAL HYPERTENSION: ICD-10-CM

## 2020-08-13 DIAGNOSIS — J45.20 MILD INTERMITTENT ASTHMA WITHOUT STATUS ASTHMATICUS WITHOUT COMPLICATION: ICD-10-CM

## 2020-08-13 DIAGNOSIS — D05.10 DUCTAL CARCINOMA IN SITU (DCIS) OF BREAST, UNSPECIFIED LATERALITY: ICD-10-CM

## 2020-08-13 LAB
BILIRUBIN URINE: NEGATIVE
BLOOD URINE: ABNORMAL
CLARITY: CLEAR
COLOR: YELLOW
CREAT UR-MCNC: 92.5 MG/DL (ref 30–125)
GLUCOSE QUALITATIVE U: NEGATIVE
KETONES, URINE: NEGATIVE
LEUKOCYTE ESTERASE URINE: NEGATIVE
MUCOUS: NORMAL
NITRITE URINE: NEGATIVE
PH URINE: 6 (ref 5–7)
PROT UR-MCNC: 9 MG/DL (ref 0–12)
PROT/CREAT 24H UR: 0.1 MG/MG (ref 0–0.2)
RED BLOOD CELLS URINE: NORMAL (ref 0–3)
SARS-COV-2 IGG SERPL QL IA: NEGATIVE
SARS-COV-2 N IGG SERPL QL IA: 0.02
SPECIFIC GRAVITY URINE: 1.01 (ref 1–1.03)
SQUAMOUS EPITHELIAL CELLS: 0
URINE PROTEIN, QUAL (DIPSTICK): NEGATIVE
UROBILINOGEN URINE: <2
WHITE BLOOD CELLS URINE: NORMAL (ref 0–5)

## 2020-08-13 PROCEDURE — 99215 OFFICE O/P EST HI 40 MIN: CPT | Performed by: INTERNAL MEDICINE

## 2020-08-13 PROCEDURE — 3074F SYST BP LT 130 MM HG: CPT | Performed by: INTERNAL MEDICINE

## 2020-08-13 PROCEDURE — 84156 ASSAY OF PROTEIN URINE: CPT | Performed by: INTERNAL MEDICINE

## 2020-08-13 PROCEDURE — 3079F DIAST BP 80-89 MM HG: CPT | Performed by: INTERNAL MEDICINE

## 2020-08-13 PROCEDURE — 81003 URINALYSIS AUTO W/O SCOPE: CPT | Performed by: INTERNAL MEDICINE

## 2020-08-13 PROCEDURE — 82570 ASSAY OF URINE CREATININE: CPT | Performed by: INTERNAL MEDICINE

## 2020-08-13 RX ORDER — CYCLOBENZAPRINE HCL 10 MG
2.5 TABLET ORAL
Qty: 30 TABLET | Refills: 5 | Status: SHIPPED | OUTPATIENT
Start: 2020-08-13 | End: 2022-07-28 | Stop reason: SDUPTHER

## 2020-08-13 RX ORDER — ATENOLOL 25 MG/1
12.5 TABLET ORAL DAILY
Qty: 180 TABLET | Refills: 1 | Status: SHIPPED | OUTPATIENT
Start: 2020-08-13 | End: 2021-01-14 | Stop reason: SDUPTHER

## 2020-08-13 SDOH — HEALTH STABILITY: MENTAL HEALTH: HOW OFTEN DO YOU HAVE A DRINK CONTAINING ALCOHOL?: NEVER

## 2020-08-13 ASSESSMENT — PATIENT HEALTH QUESTIONNAIRE - PHQ9
SUM OF ALL RESPONSES TO PHQ9 QUESTIONS 1 AND 2: 0
2. FEELING DOWN, DEPRESSED OR HOPELESS: NOT AT ALL
SUM OF ALL RESPONSES TO PHQ9 QUESTIONS 1 AND 2: 0
1. LITTLE INTEREST OR PLEASURE IN DOING THINGS: NOT AT ALL
CLINICAL INTERPRETATION OF PHQ2 SCORE: NO FURTHER SCREENING NEEDED
CLINICAL INTERPRETATION OF PHQ9 SCORE: NO FURTHER SCREENING NEEDED

## 2020-08-26 ENCOUNTER — E-ADVICE (OUTPATIENT)
Dept: DERMATOLOGY | Age: 50
End: 2020-08-26

## 2020-08-26 ENCOUNTER — TELEPHONE (OUTPATIENT)
Dept: NEPHROLOGY | Age: 50
End: 2020-08-26

## 2020-08-31 ENCOUNTER — E-ADVICE (OUTPATIENT)
Dept: NEPHROLOGY | Age: 50
End: 2020-08-31

## 2020-09-02 ENCOUNTER — IMAGING SERVICES (OUTPATIENT)
Dept: MAMMOGRAPHY | Age: 50
End: 2020-09-02
Attending: PHYSICIAN ASSISTANT

## 2020-09-02 DIAGNOSIS — Z12.31 VISIT FOR SCREENING MAMMOGRAM: ICD-10-CM

## 2020-09-02 PROCEDURE — 77063 BREAST TOMOSYNTHESIS BI: CPT | Performed by: RADIOLOGY

## 2020-09-02 PROCEDURE — 77067 SCR MAMMO BI INCL CAD: CPT | Performed by: RADIOLOGY

## 2020-09-22 ENCOUNTER — TELEPHONE (OUTPATIENT)
Dept: NEPHROLOGY | Age: 50
End: 2020-09-22

## 2020-11-12 ENCOUNTER — E-ADVICE (OUTPATIENT)
Dept: NEPHROLOGY | Age: 50
End: 2020-11-12

## 2020-11-12 DIAGNOSIS — Q87.81 ALPORT'S SYNDROME: Primary | ICD-10-CM

## 2020-11-13 ENCOUNTER — E-ADVICE (OUTPATIENT)
Dept: INTERNAL MEDICINE | Age: 50
End: 2020-11-13

## 2020-12-02 ENCOUNTER — IMAGING SERVICES (OUTPATIENT)
Dept: GENERAL RADIOLOGY | Age: 50
End: 2020-12-02
Attending: PODIATRIST

## 2020-12-02 ENCOUNTER — OFFICE VISIT (OUTPATIENT)
Dept: PODIATRY | Age: 50
End: 2020-12-02

## 2020-12-02 DIAGNOSIS — Q66.10 CAVOVARUS DEFORMITY OF FOOT: ICD-10-CM

## 2020-12-02 DIAGNOSIS — M79.672 PAIN OF LEFT HEEL: Primary | ICD-10-CM

## 2020-12-02 DIAGNOSIS — M72.2 PLANTAR FASCIITIS, BILATERAL: ICD-10-CM

## 2020-12-02 DIAGNOSIS — M79.672 PAIN OF LEFT HEEL: ICD-10-CM

## 2020-12-02 DIAGNOSIS — M72.2 PLANTAR FASCIAL FIBROMATOSIS OF LEFT FOOT: ICD-10-CM

## 2020-12-02 PROCEDURE — 99203 OFFICE O/P NEW LOW 30 MIN: CPT | Performed by: PODIATRIST

## 2020-12-02 PROCEDURE — 73630 X-RAY EXAM OF FOOT: CPT | Performed by: RADIOLOGY

## 2020-12-11 ENCOUNTER — E-ADVICE (OUTPATIENT)
Dept: NEPHROLOGY | Age: 50
End: 2020-12-11

## 2020-12-15 ENCOUNTER — LAB SERVICES (OUTPATIENT)
Dept: LAB | Age: 50
End: 2020-12-15

## 2020-12-17 ENCOUNTER — LAB SERVICES (OUTPATIENT)
Dept: LAB | Age: 50
End: 2020-12-17

## 2020-12-17 ENCOUNTER — WALK IN (OUTPATIENT)
Dept: URGENT CARE | Age: 50
End: 2020-12-17

## 2020-12-17 ENCOUNTER — LAB REQUISITION (OUTPATIENT)
Dept: LAB | Age: 50
End: 2020-12-17

## 2020-12-17 ENCOUNTER — E-ADVICE (OUTPATIENT)
Dept: INTERNAL MEDICINE | Age: 50
End: 2020-12-17

## 2020-12-17 VITALS
DIASTOLIC BLOOD PRESSURE: 97 MMHG | HEART RATE: 90 BPM | RESPIRATION RATE: 18 BRPM | SYSTOLIC BLOOD PRESSURE: 132 MMHG | TEMPERATURE: 99.7 F | OXYGEN SATURATION: 98 %

## 2020-12-17 DIAGNOSIS — Z20.828 CONTACT WITH AND (SUSPECTED) EXPOSURE TO OTHER VIRAL COMMUNICABLE DISEASES: ICD-10-CM

## 2020-12-17 DIAGNOSIS — B34.9 VIRAL ILLNESS: ICD-10-CM

## 2020-12-17 DIAGNOSIS — Q87.81 ALPORT'S SYNDROME: ICD-10-CM

## 2020-12-17 DIAGNOSIS — Z20.822 SUSPECTED COVID-19 VIRUS INFECTION: Primary | ICD-10-CM

## 2020-12-17 PROCEDURE — 99214 OFFICE O/P EST MOD 30 MIN: CPT | Performed by: FAMILY MEDICINE

## 2020-12-17 PROCEDURE — 3075F SYST BP GE 130 - 139MM HG: CPT | Performed by: FAMILY MEDICINE

## 2020-12-17 PROCEDURE — U0003 INFECTIOUS AGENT DETECTION BY NUCLEIC ACID (DNA OR RNA); SEVERE ACUTE RESPIRATORY SYNDROME CORONAVIRUS 2 (SARS-COV-2) (CORONAVIRUS DISEASE [COVID-19]), AMPLIFIED PROBE TECHNIQUE, MAKING USE OF HIGH THROUGHPUT TECHNOLOGIES AS DESCRIBED BY CMS-2020-01-R: HCPCS | Performed by: CLINICAL MEDICAL LABORATORY

## 2020-12-17 PROCEDURE — 82570 ASSAY OF URINE CREATININE: CPT | Performed by: INTERNAL MEDICINE

## 2020-12-17 PROCEDURE — U0003 INFECTIOUS AGENT DETECTION BY NUCLEIC ACID (DNA OR RNA); SEVERE ACUTE RESPIRATORY SYNDROME CORONAVIRUS 2 (SARS-COV-2) (CORONAVIRUS DISEASE [COVID-19]), AMPLIFIED PROBE TECHNIQUE, MAKING USE OF HIGH THROUGHPUT TECHNOLOGIES AS DESCRIBED BY CMS-2020-01-R: HCPCS | Performed by: FAMILY MEDICINE

## 2020-12-17 PROCEDURE — PSEU10635 2019 NOVEL CORONAVIRUS (SARS-COV-2): Performed by: CLINICAL MEDICAL LABORATORY

## 2020-12-17 PROCEDURE — 3080F DIAST BP >= 90 MM HG: CPT | Performed by: FAMILY MEDICINE

## 2020-12-17 PROCEDURE — 82043 UR ALBUMIN QUANTITATIVE: CPT | Performed by: INTERNAL MEDICINE

## 2020-12-18 ENCOUNTER — V-VISIT (OUTPATIENT)
Dept: NEPHROLOGY | Age: 50
End: 2020-12-18

## 2020-12-18 DIAGNOSIS — Q87.81 ALPORT'S SYNDROME: Primary | ICD-10-CM

## 2020-12-18 DIAGNOSIS — I10 ESSENTIAL HYPERTENSION: ICD-10-CM

## 2020-12-18 LAB
ALBUMIN/CREAT UR: NORMAL MG/G (ref 0–30)
CREAT UR-MCNC: 150.6 MG/DL
MICROALBUMIN UR-MCNC: <4.3 MG/L
SARS-COV-2 RNA RESP QL NAA+PROBE: NOT DETECTED
SARS-COV-2 RNA RESP QL NAA+PROBE: NOT DETECTED
SERVICE CMNT-IMP: NORMAL

## 2020-12-18 PROCEDURE — 99214 OFFICE O/P EST MOD 30 MIN: CPT | Performed by: INTERNAL MEDICINE

## 2021-01-14 DIAGNOSIS — R19.7 DIARRHEA DUE TO MALABSORPTION: ICD-10-CM

## 2021-01-14 DIAGNOSIS — D05.10 DUCTAL CARCINOMA IN SITU (DCIS) OF BREAST, UNSPECIFIED LATERALITY: ICD-10-CM

## 2021-01-14 DIAGNOSIS — J30.5 ALLERGIC RHINITIS DUE TO FOOD: ICD-10-CM

## 2021-01-14 DIAGNOSIS — D05.11 DUCTAL CARCINOMA IN SITU (DCIS) OF RIGHT BREAST: ICD-10-CM

## 2021-01-14 DIAGNOSIS — I10 ESSENTIAL HYPERTENSION: ICD-10-CM

## 2021-01-14 DIAGNOSIS — E55.9 VITAMIN D DEFICIENCY: ICD-10-CM

## 2021-01-14 DIAGNOSIS — J31.0 CHRONIC RHINITIS: Chronic | ICD-10-CM

## 2021-01-14 DIAGNOSIS — N39.3 STRESS INCONTINENCE, FEMALE: ICD-10-CM

## 2021-01-14 DIAGNOSIS — K90.9 DIARRHEA DUE TO MALABSORPTION: ICD-10-CM

## 2021-01-14 DIAGNOSIS — M79.10 MYALGIA: ICD-10-CM

## 2021-01-14 DIAGNOSIS — G57.62 MORTON'S NEUROMA OF LEFT FOOT: ICD-10-CM

## 2021-01-14 DIAGNOSIS — R04.0 EPISTAXIS: ICD-10-CM

## 2021-01-14 DIAGNOSIS — J45.20 MILD INTERMITTENT ASTHMA WITHOUT STATUS ASTHMATICUS WITHOUT COMPLICATION: ICD-10-CM

## 2021-01-14 DIAGNOSIS — J30.1 NON-SEASONAL ALLERGIC RHINITIS DUE TO POLLEN: Chronic | ICD-10-CM

## 2021-01-18 RX ORDER — ATENOLOL 25 MG/1
12.5 TABLET ORAL DAILY
Qty: 180 TABLET | Refills: 1 | Status: SHIPPED | OUTPATIENT
Start: 2021-01-18 | End: 2021-05-27 | Stop reason: SDUPTHER

## 2021-01-28 ENCOUNTER — APPOINTMENT (OUTPATIENT)
Dept: OBGYN | Age: 51
End: 2021-01-28

## 2021-02-04 ENCOUNTER — E-ADVICE (OUTPATIENT)
Dept: OBGYN | Age: 51
End: 2021-02-04

## 2021-02-18 ENCOUNTER — LAB SERVICES (OUTPATIENT)
Dept: LAB | Age: 51
End: 2021-02-18

## 2021-02-18 ENCOUNTER — APPOINTMENT (OUTPATIENT)
Dept: INTERNAL MEDICINE | Age: 51
End: 2021-02-18

## 2021-02-18 ENCOUNTER — OFFICE VISIT (OUTPATIENT)
Dept: OBGYN | Age: 51
End: 2021-02-18

## 2021-02-18 DIAGNOSIS — R04.0 EPISTAXIS: ICD-10-CM

## 2021-02-18 DIAGNOSIS — Q87.81 ALPORT'S SYNDROME: ICD-10-CM

## 2021-02-18 DIAGNOSIS — Z12.31 ENCOUNTER FOR SCREENING MAMMOGRAM FOR MALIGNANT NEOPLASM OF BREAST: ICD-10-CM

## 2021-02-18 DIAGNOSIS — G57.62 MORTON'S NEUROMA OF LEFT FOOT: ICD-10-CM

## 2021-02-18 DIAGNOSIS — M19.042 PRIMARY OSTEOARTHRITIS OF BOTH HANDS: ICD-10-CM

## 2021-02-18 DIAGNOSIS — J30.5 ALLERGIC RHINITIS DUE TO FOOD: ICD-10-CM

## 2021-02-18 DIAGNOSIS — Z01.419 ENCOUNTER FOR GYNECOLOGICAL EXAMINATION WITHOUT ABNORMAL FINDING: Primary | ICD-10-CM

## 2021-02-18 DIAGNOSIS — J30.1 NON-SEASONAL ALLERGIC RHINITIS DUE TO POLLEN: Chronic | ICD-10-CM

## 2021-02-18 DIAGNOSIS — E55.9 VITAMIN D DEFICIENCY: ICD-10-CM

## 2021-02-18 DIAGNOSIS — M79.10 MYALGIA: ICD-10-CM

## 2021-02-18 DIAGNOSIS — N39.3 STRESS INCONTINENCE, FEMALE: ICD-10-CM

## 2021-02-18 DIAGNOSIS — J31.0 CHRONIC RHINITIS: Chronic | ICD-10-CM

## 2021-02-18 DIAGNOSIS — K90.9 DIARRHEA DUE TO MALABSORPTION: ICD-10-CM

## 2021-02-18 DIAGNOSIS — N95.2 VAGINAL ATROPHY: ICD-10-CM

## 2021-02-18 DIAGNOSIS — I10 ESSENTIAL HYPERTENSION: ICD-10-CM

## 2021-02-18 DIAGNOSIS — D05.11 DUCTAL CARCINOMA IN SITU (DCIS) OF RIGHT BREAST: ICD-10-CM

## 2021-02-18 DIAGNOSIS — D05.10 DUCTAL CARCINOMA IN SITU (DCIS) OF BREAST, UNSPECIFIED LATERALITY: ICD-10-CM

## 2021-02-18 DIAGNOSIS — R19.7 DIARRHEA DUE TO MALABSORPTION: ICD-10-CM

## 2021-02-18 DIAGNOSIS — J45.20 MILD INTERMITTENT ASTHMA WITHOUT STATUS ASTHMATICUS WITHOUT COMPLICATION: ICD-10-CM

## 2021-02-18 DIAGNOSIS — M19.041 PRIMARY OSTEOARTHRITIS OF BOTH HANDS: ICD-10-CM

## 2021-02-18 LAB
25(OH)D3 SERPL-MCNC: 29.7 NG/ML (ref 30–100)
ALBUMIN SERPL-MCNC: 4.9 G/DL (ref 3.6–5.1)
ALP SERPL-CCNC: 78 U/L (ref 45–130)
ALT SERPL W/O P-5'-P-CCNC: 40 U/L (ref 4–38)
AST SERPL-CCNC: 36 U/L (ref 14–43)
BASOPHIL %: 0.7 % (ref 0–1.2)
BASOPHIL ABSOLUTE #: 0 10*3/UL (ref 0–0.1)
BILIRUB SERPL-MCNC: 0.4 MG/DL (ref 0–1.3)
BUN SERPL-MCNC: 15 MG/DL (ref 7–20)
CALCIUM SERPL-MCNC: 10.2 MG/DL (ref 8.6–10.6)
CHLORIDE SERPL-SCNC: 102 MMOL/L (ref 96–107)
CHOLEST SERPL-MCNC: 191 MG/DL (ref 140–200)
CO2 SERPL-SCNC: 29 MMOL/L (ref 22–32)
CREAT SERPL-MCNC: 0.8 MG/DL (ref 0.5–1.4)
DIFFERENTIAL TYPE: NORMAL
EOSINOPHIL %: 1.6 % (ref 0–10)
EOSINOPHIL ABSOLUTE #: 0.1 10*3/UL (ref 0–0.5)
GFR SERPL CREATININE-BSD FRML MDRD: >60 ML/MIN/{1.73M2}
GFR SERPL CREATININE-BSD FRML MDRD: >60 ML/MIN/{1.73M2}
GLUCOSE P FAST SERPL-MCNC: 103 MG/DL (ref 60–100)
HDLC SERPL-MCNC: 73 MG/DL
HEMATOCRIT: 41.6 % (ref 34–45)
HEMOGLOBIN: 13.5 G/DL (ref 11.2–15.7)
IMMATURE GRANULOCYTE ABSOLUTE: 0.01 10*3/UL (ref 0–0.05)
IMMATURE GRANULOCYTE PERCENT: 0.2 % (ref 0–0.5)
LDLC SERPL CALC-MCNC: 98 MG/DL (ref 30–100)
LYMPH PERCENT: 36.4 % (ref 20.5–51.1)
LYMPHOCYTE ABSOLUTE #: 1.6 10*3/UL (ref 1.2–3.4)
MEAN CORPUSCULAR HGB CONCENTRATION: 32.5 % (ref 32–36)
MEAN CORPUSCULAR HGB: 30.1 PG (ref 27–34)
MEAN CORPUSCULAR VOLUME: 92.9 FL (ref 79–95)
MEAN PLATELET VOLUME: 11.1 FL (ref 8.6–12.4)
MONOCYTE ABSOLUTE #: 0.4 10*3/UL (ref 0.2–0.9)
MONOCYTE PERCENT: 8.2 % (ref 4.3–12.9)
NEUTROPHIL ABSOLUTE #: 2.4 10*3/UL (ref 1.4–6.5)
NEUTROPHIL PERCENT: 52.9 % (ref 34–73.5)
PLATELET COUNT: 211 10*3/UL (ref 150–400)
POTASSIUM SERPL-SCNC: 4.4 MMOL/L (ref 3.5–5.3)
PROT SERPL-MCNC: 8.2 G/DL (ref 6.4–8.5)
RED BLOOD CELL COUNT: 4.48 10*6/UL (ref 3.7–5.2)
RED CELL DISTRIBUTION WIDTH: 13.3 % (ref 11.3–14.8)
SODIUM SERPL-SCNC: 140 MMOL/L (ref 136–146)
TRIGL SERPL-MCNC: 98 MG/DL (ref 0–200)
WHITE BLOOD CELL COUNT: 4.5 10*3/UL (ref 4–10)

## 2021-02-18 PROCEDURE — 85025 COMPLETE CBC W/AUTO DIFF WBC: CPT | Performed by: INTERNAL MEDICINE

## 2021-02-18 PROCEDURE — 82306 VITAMIN D 25 HYDROXY: CPT | Performed by: INTERNAL MEDICINE

## 2021-02-18 PROCEDURE — 80053 COMPREHEN METABOLIC PANEL: CPT | Performed by: INTERNAL MEDICINE

## 2021-02-18 PROCEDURE — 80061 LIPID PANEL: CPT | Performed by: INTERNAL MEDICINE

## 2021-02-18 PROCEDURE — 3078F DIAST BP <80 MM HG: CPT | Performed by: PHYSICIAN ASSISTANT

## 2021-02-18 PROCEDURE — 3074F SYST BP LT 130 MM HG: CPT | Performed by: PHYSICIAN ASSISTANT

## 2021-02-18 PROCEDURE — 36415 COLL VENOUS BLD VENIPUNCTURE: CPT | Performed by: INTERNAL MEDICINE

## 2021-02-18 PROCEDURE — 99396 PREV VISIT EST AGE 40-64: CPT | Performed by: PHYSICIAN ASSISTANT

## 2021-02-18 SDOH — HEALTH STABILITY: MENTAL HEALTH: HOW MANY STANDARD DRINKS CONTAINING ALCOHOL DO YOU HAVE ON A TYPICAL DAY?: 1 OR 2

## 2021-02-18 SDOH — HEALTH STABILITY: MENTAL HEALTH: HOW OFTEN DO YOU HAVE 6 OR MORE DRINKS ON ONE OCCASION?: NEVER

## 2021-02-18 SDOH — HEALTH STABILITY: MENTAL HEALTH: HOW OFTEN DO YOU HAVE A DRINK CONTAINING ALCOHOL?: MONTHLY OR LESS

## 2021-02-18 ASSESSMENT — ENCOUNTER SYMPTOMS
TROUBLE SWALLOWING: 0
EYE REDNESS: 0
BLOOD IN STOOL: 0
APPETITE CHANGE: 0
UNEXPECTED WEIGHT CHANGE: 0
EYE DISCHARGE: 0
SHORTNESS OF BREATH: 0
CONSTIPATION: 0
WHEEZING: 0

## 2021-02-18 ASSESSMENT — PATIENT HEALTH QUESTIONNAIRE - PHQ9
1. LITTLE INTEREST OR PLEASURE IN DOING THINGS: NOT AT ALL
SUM OF ALL RESPONSES TO PHQ9 QUESTIONS 1 AND 2: 0
CLINICAL INTERPRETATION OF PHQ2 SCORE: NO FURTHER SCREENING NEEDED
CLINICAL INTERPRETATION OF PHQ9 SCORE: NO FURTHER SCREENING NEEDED
SUM OF ALL RESPONSES TO PHQ9 QUESTIONS 1 AND 2: 0
2. FEELING DOWN, DEPRESSED OR HOPELESS: NOT AT ALL

## 2021-02-18 ASSESSMENT — PAIN SCALES - GENERAL: PAINLEVEL: 0

## 2021-02-25 DIAGNOSIS — E55.9 VITAMIN D DEFICIENCY: Primary | ICD-10-CM

## 2021-02-25 RX ORDER — ERGOCALCIFEROL 1.25 MG/1
1.25 CAPSULE ORAL
Qty: 12 CAPSULE | Refills: 0 | Status: SHIPPED | OUTPATIENT
Start: 2021-03-01 | End: 2021-05-24 | Stop reason: ALTCHOICE

## 2021-03-03 ENCOUNTER — OFFICE VISIT (OUTPATIENT)
Dept: AUDIOLOGY | Age: 51
End: 2021-03-03

## 2021-03-03 DIAGNOSIS — H93.13 SUBJECTIVE TINNITUS OF BOTH EARS: ICD-10-CM

## 2021-03-03 DIAGNOSIS — H90.3 SENSORINEURAL HEARING LOSS (SNHL) OF BOTH EARS: Primary | ICD-10-CM

## 2021-03-03 PROCEDURE — 92557 COMPREHENSIVE HEARING TEST: CPT | Performed by: AUDIOLOGIST

## 2021-03-16 ENCOUNTER — E-ADVICE (OUTPATIENT)
Dept: INTERNAL MEDICINE | Age: 51
End: 2021-03-16

## 2021-03-24 ENCOUNTER — E-ADVICE (OUTPATIENT)
Dept: INTERNAL MEDICINE | Age: 51
End: 2021-03-24

## 2021-04-08 ENCOUNTER — E-ADVICE (OUTPATIENT)
Dept: INTERNAL MEDICINE | Age: 51
End: 2021-04-08

## 2021-04-16 ENCOUNTER — E-ADVICE (OUTPATIENT)
Dept: NEPHROLOGY | Age: 51
End: 2021-04-16

## 2021-05-20 ENCOUNTER — E-ADVICE (OUTPATIENT)
Dept: INTERNAL MEDICINE | Age: 51
End: 2021-05-20

## 2021-05-21 ENCOUNTER — LAB SERVICES (OUTPATIENT)
Dept: LAB | Age: 51
End: 2021-05-21

## 2021-05-21 DIAGNOSIS — E55.9 VITAMIN D DEFICIENCY: ICD-10-CM

## 2021-05-21 LAB — 25(OH)D3 SERPL-MCNC: 46.3 NG/ML (ref 30–100)

## 2021-05-21 PROCEDURE — 82306 VITAMIN D 25 HYDROXY: CPT | Performed by: INTERNAL MEDICINE

## 2021-05-21 PROCEDURE — 36415 COLL VENOUS BLD VENIPUNCTURE: CPT | Performed by: INTERNAL MEDICINE

## 2021-05-25 VITALS
OXYGEN SATURATION: 96 % | TEMPERATURE: 97.9 F | TEMPERATURE: 98.3 F | DIASTOLIC BLOOD PRESSURE: 88 MMHG | HEIGHT: 69 IN | RESPIRATION RATE: 16 BRPM | SYSTOLIC BLOOD PRESSURE: 138 MMHG | RESPIRATION RATE: 18 BRPM | HEART RATE: 88 BPM | HEART RATE: 90 BPM | WEIGHT: 212.8 LBS | DIASTOLIC BLOOD PRESSURE: 68 MMHG | OXYGEN SATURATION: 98 % | SYSTOLIC BLOOD PRESSURE: 100 MMHG | BODY MASS INDEX: 31.52 KG/M2

## 2021-05-27 ENCOUNTER — OFFICE VISIT (OUTPATIENT)
Dept: INTERNAL MEDICINE | Age: 51
End: 2021-05-27

## 2021-05-27 VITALS
SYSTOLIC BLOOD PRESSURE: 118 MMHG | WEIGHT: 214 LBS | BODY MASS INDEX: 31.7 KG/M2 | HEIGHT: 69 IN | HEART RATE: 88 BPM | DIASTOLIC BLOOD PRESSURE: 78 MMHG | TEMPERATURE: 98 F | RESPIRATION RATE: 16 BRPM

## 2021-05-27 DIAGNOSIS — Z86.000 HISTORY OF DUCTAL CARCINOMA IN SITU (DCIS) OF BREAST: ICD-10-CM

## 2021-05-27 DIAGNOSIS — Q87.81 ALPORT'S SYNDROME: Primary | ICD-10-CM

## 2021-05-27 DIAGNOSIS — I15.9 SECONDARY HYPERTENSION, BENIGN: ICD-10-CM

## 2021-05-27 DIAGNOSIS — K76.0 FATTY LIVER: ICD-10-CM

## 2021-05-27 DIAGNOSIS — E66.9 OBESITY (BMI 30.0-34.9): ICD-10-CM

## 2021-05-27 DIAGNOSIS — E55.9 VITAMIN D DEFICIENCY: ICD-10-CM

## 2021-05-27 DIAGNOSIS — J30.1 NON-SEASONAL ALLERGIC RHINITIS DUE TO POLLEN: Chronic | ICD-10-CM

## 2021-05-27 PROBLEM — M72.2 PLANTAR FASCIITIS, BILATERAL: Status: RESOLVED | Noted: 2020-12-02 | Resolved: 2021-05-27

## 2021-05-27 PROCEDURE — 99396 PREV VISIT EST AGE 40-64: CPT | Performed by: INTERNAL MEDICINE

## 2021-05-27 PROCEDURE — 99214 OFFICE O/P EST MOD 30 MIN: CPT | Performed by: INTERNAL MEDICINE

## 2021-05-27 PROCEDURE — 3074F SYST BP LT 130 MM HG: CPT | Performed by: INTERNAL MEDICINE

## 2021-05-27 PROCEDURE — 3078F DIAST BP <80 MM HG: CPT | Performed by: INTERNAL MEDICINE

## 2021-05-27 RX ORDER — ATENOLOL 25 MG/1
12.5 TABLET ORAL DAILY
Qty: 180 TABLET | Refills: 1 | Status: SHIPPED | OUTPATIENT
Start: 2021-05-27 | End: 2022-05-10

## 2021-05-27 RX ORDER — CYANOCOBALAMIN (VITAMIN B-12) 2500 MCG
TABLET, SUBLINGUAL SUBLINGUAL
COMMUNITY
Start: 2021-04-01 | End: 2022-07-27

## 2021-05-27 ASSESSMENT — PATIENT HEALTH QUESTIONNAIRE - PHQ9
SUM OF ALL RESPONSES TO PHQ9 QUESTIONS 1 AND 2: 0
CLINICAL INTERPRETATION OF PHQ2 SCORE: NO FURTHER SCREENING NEEDED
1. LITTLE INTEREST OR PLEASURE IN DOING THINGS: NOT AT ALL
2. FEELING DOWN, DEPRESSED OR HOPELESS: NOT AT ALL
SUM OF ALL RESPONSES TO PHQ9 QUESTIONS 1 AND 2: 0
CLINICAL INTERPRETATION OF PHQ9 SCORE: NO FURTHER SCREENING NEEDED

## 2021-06-03 ENCOUNTER — E-ADVICE (OUTPATIENT)
Dept: INTERNAL MEDICINE | Age: 51
End: 2021-06-03

## 2021-06-14 ENCOUNTER — E-ADVICE (OUTPATIENT)
Dept: NEPHROLOGY | Age: 51
End: 2021-06-14

## 2021-06-14 DIAGNOSIS — R31.29 MICROSCOPIC HEMATURIA: ICD-10-CM

## 2021-06-14 DIAGNOSIS — R80.9 PROTEINURIA, UNSPECIFIED TYPE: Primary | ICD-10-CM

## 2021-07-13 ENCOUNTER — E-ADVICE (OUTPATIENT)
Dept: NEPHROLOGY | Age: 51
End: 2021-07-13

## 2021-07-16 ENCOUNTER — LAB SERVICES (OUTPATIENT)
Dept: LAB | Age: 51
End: 2021-07-16

## 2021-07-16 DIAGNOSIS — Q87.81 ALPORT'S SYNDROME: ICD-10-CM

## 2021-07-16 DIAGNOSIS — I10 ESSENTIAL HYPERTENSION: ICD-10-CM

## 2021-07-16 LAB
BILIRUBIN URINE: NEGATIVE
BLOOD URINE: NEGATIVE
BUN SERPL-MCNC: 17 MG/DL (ref 7–20)
CALCIUM SERPL-MCNC: 10.2 MG/DL (ref 8.6–10.6)
CHLORIDE SERPL-SCNC: 102 MMOL/L (ref 96–107)
CLARITY: ABNORMAL
CO2 SERPL-SCNC: 24 MMOL/L (ref 22–32)
COLOR: YELLOW
CREAT SERPL-MCNC: 0.7 MG/DL (ref 0.5–1.4)
GFR SERPL CREATININE-BSD FRML MDRD: >60 ML/MIN/{1.73M2}
GFR SERPL CREATININE-BSD FRML MDRD: >60 ML/MIN/{1.73M2}
GLUCOSE QUALITATIVE U: NEGATIVE
GLUCOSE SERPL-MCNC: 90 MG/DL (ref 70–200)
KETONES, URINE: ABNORMAL
LEUKOCYTE ESTERASE URINE: ABNORMAL
MUCOUS: ABNORMAL
NITRITE URINE: NEGATIVE
PH URINE: 6 (ref 5–7)
POTASSIUM SERPL-SCNC: 4.3 MMOL/L (ref 3.5–5.3)
RED BLOOD CELLS URINE: ABNORMAL (ref 0–3)
SODIUM SERPL-SCNC: 139 MMOL/L (ref 136–146)
SPECIFIC GRAVITY URINE: 1.02 (ref 1–1.03)
SQUAMOUS EPITHELIAL CELLS: ABNORMAL
URINE PROTEIN, QUAL (DIPSTICK): 30
UROBILINOGEN URINE: <2
WHITE BLOOD CELLS URINE: ABNORMAL (ref 0–5)

## 2021-07-16 PROCEDURE — 82043 UR ALBUMIN QUANTITATIVE: CPT | Performed by: INTERNAL MEDICINE

## 2021-07-16 PROCEDURE — 36415 COLL VENOUS BLD VENIPUNCTURE: CPT | Performed by: INTERNAL MEDICINE

## 2021-07-16 PROCEDURE — 81003 URINALYSIS AUTO W/O SCOPE: CPT | Performed by: INTERNAL MEDICINE

## 2021-07-16 PROCEDURE — 82570 ASSAY OF URINE CREATININE: CPT | Performed by: INTERNAL MEDICINE

## 2021-07-16 PROCEDURE — 80048 BASIC METABOLIC PNL TOTAL CA: CPT | Performed by: INTERNAL MEDICINE

## 2021-07-16 PROCEDURE — 87086 URINE CULTURE/COLONY COUNT: CPT | Performed by: INTERNAL MEDICINE

## 2021-07-17 LAB — FINAL REPORT: NORMAL

## 2021-07-19 LAB
ALBUMIN/CREAT UR: 22.7 MG/G (ref 0–30)
CREAT UR-MCNC: 142.3 MG/DL
MICROALBUMIN UR-MCNC: 32.2 MG/L

## 2021-07-22 RX ORDER — LISINOPRIL 2.5 MG/1
2.5 TABLET ORAL DAILY
Qty: 90 TABLET | Refills: 3 | Status: SHIPPED | OUTPATIENT
Start: 2021-07-22 | End: 2022-02-22 | Stop reason: ALTCHOICE

## 2021-09-13 ENCOUNTER — IMAGING SERVICES (OUTPATIENT)
Dept: MAMMOGRAPHY | Age: 51
End: 2021-09-13
Attending: PHYSICIAN ASSISTANT

## 2021-09-13 DIAGNOSIS — Z12.31 ENCOUNTER FOR SCREENING MAMMOGRAM FOR MALIGNANT NEOPLASM OF BREAST: ICD-10-CM

## 2021-09-13 PROCEDURE — 77063 BREAST TOMOSYNTHESIS BI: CPT | Performed by: RADIOLOGY

## 2021-09-13 PROCEDURE — 77067 SCR MAMMO BI INCL CAD: CPT | Performed by: RADIOLOGY

## 2021-09-15 ENCOUNTER — E-ADVICE (OUTPATIENT)
Dept: OBGYN | Age: 51
End: 2021-09-15

## 2021-09-16 DIAGNOSIS — R92.2 DENSE BREASTS: ICD-10-CM

## 2021-09-16 DIAGNOSIS — Z12.39 BREAST CANCER SCREENING, HIGH RISK PATIENT: Primary | ICD-10-CM

## 2021-09-16 DIAGNOSIS — R92.30 DENSE BREASTS: ICD-10-CM

## 2021-09-24 ENCOUNTER — E-ADVICE (OUTPATIENT)
Dept: NEPHROLOGY | Age: 51
End: 2021-09-24

## 2021-10-22 ENCOUNTER — TELEPHONE (OUTPATIENT)
Dept: NEPHROLOGY | Age: 51
End: 2021-10-22

## 2022-01-03 ENCOUNTER — E-ADVICE (OUTPATIENT)
Dept: INTERNAL MEDICINE | Age: 52
End: 2022-01-03

## 2022-01-19 ENCOUNTER — IMAGING SERVICES (OUTPATIENT)
Dept: ULTRASOUND IMAGING | Age: 52
End: 2022-01-19
Attending: PHYSICIAN ASSISTANT

## 2022-01-19 ENCOUNTER — LAB SERVICES (OUTPATIENT)
Dept: LAB | Age: 52
End: 2022-01-19

## 2022-01-19 DIAGNOSIS — I15.9 SECONDARY HYPERTENSION, BENIGN: ICD-10-CM

## 2022-01-19 DIAGNOSIS — R80.9 PROTEINURIA, UNSPECIFIED TYPE: ICD-10-CM

## 2022-01-19 DIAGNOSIS — Z12.39 BREAST CANCER SCREENING, HIGH RISK PATIENT: ICD-10-CM

## 2022-01-19 DIAGNOSIS — R92.2 DENSE BREASTS: ICD-10-CM

## 2022-01-19 DIAGNOSIS — R80.9 PROTEINURIA, UNSPECIFIED TYPE: Primary | ICD-10-CM

## 2022-01-19 DIAGNOSIS — R92.8 ABNORMAL ULTRASOUND OF BREAST: Primary | ICD-10-CM

## 2022-01-19 DIAGNOSIS — Q87.81 ALPORT'S SYNDROME: ICD-10-CM

## 2022-01-19 DIAGNOSIS — R92.30 DENSE BREASTS: ICD-10-CM

## 2022-01-19 DIAGNOSIS — E55.9 VITAMIN D DEFICIENCY: ICD-10-CM

## 2022-01-19 DIAGNOSIS — J30.1 NON-SEASONAL ALLERGIC RHINITIS DUE TO POLLEN: Chronic | ICD-10-CM

## 2022-01-19 LAB
25(OH)D3 SERPL-MCNC: 33.7 NG/ML (ref 30–100)
ALBUMIN SERPL BCG-MCNC: 4.3 G/DL (ref 3.6–5.1)
ALP SERPL-CCNC: 89 U/L (ref 45–130)
ALT SERPL W/O P-5'-P-CCNC: 20 U/L (ref 7–34)
AST SERPL-CCNC: 24 U/L (ref 9–37)
BILIRUB SERPL-MCNC: 0.5 MG/DL (ref 0–1)
BUN SERPL-MCNC: 17 MG/DL (ref 7–20)
CALCIUM SERPL-MCNC: 9.8 MG/DL (ref 8.6–10.6)
CHLORIDE SERPL-SCNC: 104 MMOL/L (ref 96–107)
CHOLEST SERPL-MCNC: 189 MG/DL (ref 140–200)
CREAT SERPL-MCNC: 0.9 MG/DL (ref 0.5–1.4)
GFR SERPL CREATININE-BSD FRML MDRD: >60 ML/MIN/{1.73M2}
GFR SERPL CREATININE-BSD FRML MDRD: >60 ML/MIN/{1.73M2}
GLUCOSE P FAST SERPL-MCNC: 116 MG/DL (ref 60–100)
HCO3 SERPL-SCNC: 28 MMOL/L (ref 22–32)
HDLC SERPL-MCNC: 68 MG/DL
LDLC SERPL CALC-MCNC: 104 MG/DL (ref 0–100)
POTASSIUM SERPL-SCNC: 4.3 MMOL/L (ref 3.5–5.3)
PROT SERPL-MCNC: 6.7 G/DL (ref 6.2–8.1)
SODIUM SERPL-SCNC: 142 MMOL/L (ref 136–146)
TRIGL SERPL-MCNC: 85 MG/DL (ref 0–200)

## 2022-01-19 PROCEDURE — 80061 LIPID PANEL: CPT | Performed by: INTERNAL MEDICINE

## 2022-01-19 PROCEDURE — 36415 COLL VENOUS BLD VENIPUNCTURE: CPT | Performed by: INTERNAL MEDICINE

## 2022-01-19 PROCEDURE — 80053 COMPREHEN METABOLIC PANEL: CPT | Performed by: INTERNAL MEDICINE

## 2022-01-19 PROCEDURE — 82306 VITAMIN D 25 HYDROXY: CPT | Performed by: INTERNAL MEDICINE

## 2022-01-19 PROCEDURE — 76641 ULTRASOUND BREAST COMPLETE: CPT | Performed by: RADIOLOGY

## 2022-01-20 ENCOUNTER — TELEPHONE (OUTPATIENT)
Dept: OBGYN | Age: 52
End: 2022-01-20

## 2022-01-20 DIAGNOSIS — N83.291 COMPLEX CYST OF RIGHT OVARY: Primary | ICD-10-CM

## 2022-02-09 ENCOUNTER — OFFICE VISIT (OUTPATIENT)
Dept: NEPHROLOGY | Age: 52
End: 2022-02-09

## 2022-02-09 ENCOUNTER — TELEPHONE (OUTPATIENT)
Dept: NEPHROLOGY | Age: 52
End: 2022-02-09

## 2022-02-09 ENCOUNTER — LAB SERVICES (OUTPATIENT)
Dept: LAB | Age: 52
End: 2022-02-09

## 2022-02-09 VITALS
BODY MASS INDEX: 27.17 KG/M2 | WEIGHT: 184 LBS | SYSTOLIC BLOOD PRESSURE: 124 MMHG | DIASTOLIC BLOOD PRESSURE: 80 MMHG | HEART RATE: 88 BPM

## 2022-02-09 DIAGNOSIS — I10 ESSENTIAL HYPERTENSION: ICD-10-CM

## 2022-02-09 DIAGNOSIS — R73.01 IMPAIRED FASTING GLUCOSE: ICD-10-CM

## 2022-02-09 DIAGNOSIS — Q87.81 ALPORT'S SYNDROME: Primary | ICD-10-CM

## 2022-02-09 DIAGNOSIS — R80.9 PROTEINURIA, UNSPECIFIED TYPE: ICD-10-CM

## 2022-02-09 LAB
ALBUMIN/CREAT UR: NORMAL MG/G (ref 0–30)
BILIRUBIN URINE: NEGATIVE
BLOOD URINE: NEGATIVE
CLARITY: CLEAR
COLOR: YELLOW
CREAT UR-MCNC: 79.1 MG/DL
GLUCOSE QUALITATIVE U: NEGATIVE
KETONES, URINE: NEGATIVE
LEUKOCYTE ESTERASE URINE: NEGATIVE
MICROALBUMIN UR-MCNC: <4.3 MG/L
NITRITE URINE: NEGATIVE
PH URINE: 6 (ref 5–7)
RED BLOOD CELLS URINE: NORMAL (ref 0–3)
SPECIFIC GRAVITY URINE: 1.01 (ref 1–1.03)
SQUAMOUS EPITHELIAL CELLS: 0
URINE PROTEIN, QUAL (DIPSTICK): NEGATIVE
UROBILINOGEN URINE: <2
WHITE BLOOD CELLS URINE: NORMAL (ref 0–5)

## 2022-02-09 PROCEDURE — 3074F SYST BP LT 130 MM HG: CPT | Performed by: INTERNAL MEDICINE

## 2022-02-09 PROCEDURE — 99214 OFFICE O/P EST MOD 30 MIN: CPT | Performed by: INTERNAL MEDICINE

## 2022-02-09 PROCEDURE — 82570 ASSAY OF URINE CREATININE: CPT | Performed by: INTERNAL MEDICINE

## 2022-02-09 PROCEDURE — 81003 URINALYSIS AUTO W/O SCOPE: CPT | Performed by: INTERNAL MEDICINE

## 2022-02-09 PROCEDURE — 82043 UR ALBUMIN QUANTITATIVE: CPT | Performed by: INTERNAL MEDICINE

## 2022-02-09 PROCEDURE — 3079F DIAST BP 80-89 MM HG: CPT | Performed by: INTERNAL MEDICINE

## 2022-02-22 ENCOUNTER — OFFICE VISIT (OUTPATIENT)
Dept: OBGYN | Age: 52
End: 2022-02-22

## 2022-02-22 VITALS
HEIGHT: 69 IN | DIASTOLIC BLOOD PRESSURE: 80 MMHG | RESPIRATION RATE: 20 BRPM | BODY MASS INDEX: 27.43 KG/M2 | TEMPERATURE: 97.5 F | HEART RATE: 80 BPM | SYSTOLIC BLOOD PRESSURE: 110 MMHG | WEIGHT: 185.2 LBS

## 2022-02-22 DIAGNOSIS — N95.2 VAGINAL ATROPHY: ICD-10-CM

## 2022-02-22 DIAGNOSIS — Z01.419 ENCOUNTER FOR GYNECOLOGICAL EXAMINATION WITHOUT ABNORMAL FINDING: Primary | ICD-10-CM

## 2022-02-22 PROCEDURE — 99396 PREV VISIT EST AGE 40-64: CPT | Performed by: PHYSICIAN ASSISTANT

## 2022-02-22 ASSESSMENT — ENCOUNTER SYMPTOMS
TROUBLE SWALLOWING: 0
SHORTNESS OF BREATH: 0
WHEEZING: 0
UNEXPECTED WEIGHT CHANGE: 0
CONSTIPATION: 0
EYE REDNESS: 0
EYE DISCHARGE: 0
APPETITE CHANGE: 0
BLOOD IN STOOL: 0

## 2022-02-22 ASSESSMENT — PATIENT HEALTH QUESTIONNAIRE - PHQ9
CLINICAL INTERPRETATION OF PHQ2 SCORE: NO FURTHER SCREENING NEEDED
1. LITTLE INTEREST OR PLEASURE IN DOING THINGS: NOT AT ALL
SUM OF ALL RESPONSES TO PHQ9 QUESTIONS 1 AND 2: 0
SUM OF ALL RESPONSES TO PHQ9 QUESTIONS 1 AND 2: 0
2. FEELING DOWN, DEPRESSED OR HOPELESS: NOT AT ALL

## 2022-03-15 ENCOUNTER — APPOINTMENT (OUTPATIENT)
Dept: AUDIOLOGY | Age: 52
End: 2022-03-15

## 2022-03-23 ENCOUNTER — TELEPHONE (OUTPATIENT)
Dept: OBGYN | Age: 52
End: 2022-03-23

## 2022-04-04 ENCOUNTER — E-ADVICE (OUTPATIENT)
Dept: OBGYN | Age: 52
End: 2022-04-04

## 2022-05-09 DIAGNOSIS — E55.9 VITAMIN D DEFICIENCY: ICD-10-CM

## 2022-05-09 DIAGNOSIS — J30.1 NON-SEASONAL ALLERGIC RHINITIS DUE TO POLLEN: Chronic | ICD-10-CM

## 2022-05-10 RX ORDER — ATENOLOL 25 MG/1
TABLET ORAL
Qty: 45 TABLET | Refills: 0 | Status: SHIPPED | OUTPATIENT
Start: 2022-05-10 | End: 2022-07-28 | Stop reason: SDUPTHER

## 2022-05-24 ENCOUNTER — E-ADVICE (OUTPATIENT)
Dept: OBGYN | Age: 52
End: 2022-05-24

## 2022-06-01 ENCOUNTER — APPOINTMENT (OUTPATIENT)
Dept: INTERNAL MEDICINE | Age: 52
End: 2022-06-01

## 2022-06-02 ENCOUNTER — APPOINTMENT (OUTPATIENT)
Dept: LAB | Age: 52
End: 2022-06-02

## 2022-06-03 ENCOUNTER — TELEPHONE (OUTPATIENT)
Dept: SCHEDULING | Age: 52
End: 2022-06-03

## 2022-06-21 ENCOUNTER — E-ADVICE (OUTPATIENT)
Dept: OBGYN | Age: 52
End: 2022-06-21

## 2022-07-13 ENCOUNTER — LAB SERVICES (OUTPATIENT)
Dept: LAB | Age: 52
End: 2022-07-13

## 2022-07-13 ENCOUNTER — TELEPHONE (OUTPATIENT)
Dept: NEPHROLOGY | Age: 52
End: 2022-07-13

## 2022-07-13 ENCOUNTER — OFFICE VISIT (OUTPATIENT)
Dept: OPHTHALMOLOGY | Age: 52
End: 2022-07-13

## 2022-07-13 ENCOUNTER — OFFICE VISIT (OUTPATIENT)
Dept: OPTOMETRY | Age: 52
End: 2022-07-13

## 2022-07-13 DIAGNOSIS — H52.4 PRESBYOPIA: ICD-10-CM

## 2022-07-13 DIAGNOSIS — Z00.00 ANNUAL PHYSICAL EXAM: Primary | ICD-10-CM

## 2022-07-13 DIAGNOSIS — Z00.00 ANNUAL PHYSICAL EXAM: ICD-10-CM

## 2022-07-13 DIAGNOSIS — H52.13 MYOPIC ASTIGMATISM, BILATERAL: Primary | ICD-10-CM

## 2022-07-13 DIAGNOSIS — H52.203 MYOPIC ASTIGMATISM, BILATERAL: Primary | ICD-10-CM

## 2022-07-13 DIAGNOSIS — E55.9 VITAMIN D DEFICIENCY: ICD-10-CM

## 2022-07-13 LAB
25(OH)D3 SERPL-MCNC: 42.8 NG/ML (ref 30–100)
ALBUMIN SERPL BCG-MCNC: 4.5 G/DL (ref 3.6–5.1)
ALP SERPL-CCNC: 89 U/L (ref 45–130)
ALT SERPL W/O P-5'-P-CCNC: 22 U/L (ref 7–34)
AST SERPL-CCNC: 26 U/L (ref 9–37)
BASOPHIL %: 0.4 % (ref 0–1.2)
BASOPHIL ABSOLUTE #: 0 10*3/UL (ref 0–0.1)
BILIRUB SERPL-MCNC: 0.5 MG/DL (ref 0–1)
BUN SERPL-MCNC: 11 MG/DL (ref 7–20)
CALCIUM SERPL-MCNC: 10.3 MG/DL (ref 8.6–10.6)
CHLORIDE SERPL-SCNC: 102 MMOL/L (ref 96–107)
CHOLEST SERPL-MCNC: 169 MG/DL (ref 140–200)
CREAT SERPL-MCNC: 0.8 MG/DL (ref 0.5–1.4)
DIFFERENTIAL TYPE: NORMAL
EOSINOPHIL %: 0.8 % (ref 0–10)
EOSINOPHIL ABSOLUTE #: 0 10*3/UL (ref 0–0.5)
GFR SERPL CREATININE-BSD FRML MDRD: >60 ML/MIN/{1.73M2}
GFR SERPL CREATININE-BSD FRML MDRD: >60 ML/MIN/{1.73M2}
GLUCOSE P FAST SERPL-MCNC: 107 MG/DL (ref 60–100)
HCO3 SERPL-SCNC: 24 MMOL/L (ref 22–32)
HDLC SERPL-MCNC: 80 MG/DL
HEMATOCRIT: 40.4 % (ref 34–45)
HEMOGLOBIN: 13.2 G/DL (ref 11.2–15.7)
IMMATURE GRANULOCYTE ABSOLUTE: 0.01 10*3/UL (ref 0–0.05)
IMMATURE GRANULOCYTE PERCENT: 0.2 % (ref 0–0.5)
LDLC SERPL CALC-MCNC: 73 MG/DL (ref 0–100)
LYMPH PERCENT: 36.7 % (ref 20.5–51.1)
LYMPHOCYTE ABSOLUTE #: 1.8 10*3/UL (ref 1.2–3.4)
MEAN CORPUSCULAR HGB CONCENTRATION: 32.7 % (ref 32–36)
MEAN CORPUSCULAR HGB: 30.8 PG (ref 27–34)
MEAN CORPUSCULAR VOLUME: 94.4 FL (ref 79–95)
MEAN PLATELET VOLUME: 9.7 FL (ref 8.6–12.4)
MONOCYTE ABSOLUTE #: 0.4 10*3/UL (ref 0.2–0.9)
MONOCYTE PERCENT: 8.6 % (ref 4.3–12.9)
NEUTROPHIL ABSOLUTE #: 2.7 10*3/UL (ref 1.4–6.5)
NEUTROPHIL PERCENT: 53.3 % (ref 34–73.5)
PLATELET COUNT: 204 10*3/UL (ref 150–400)
POTASSIUM SERPL-SCNC: 4.4 MMOL/L (ref 3.5–5.3)
PROT SERPL-MCNC: 6.7 G/DL (ref 6.2–8.1)
RED BLOOD CELL COUNT: 4.28 10*6/UL (ref 3.7–5.2)
RED CELL DISTRIBUTION WIDTH: 13.2 % (ref 11.3–14.8)
SODIUM SERPL-SCNC: 140 MMOL/L (ref 136–146)
TRIGL SERPL-MCNC: 83 MG/DL (ref 0–200)
WHITE BLOOD CELL COUNT: 5 10*3/UL (ref 4–10)

## 2022-07-13 PROCEDURE — 80061 LIPID PANEL: CPT | Performed by: INTERNAL MEDICINE

## 2022-07-13 PROCEDURE — 92015 DETERMINE REFRACTIVE STATE: CPT | Performed by: OPTOMETRIST

## 2022-07-13 PROCEDURE — 36415 COLL VENOUS BLD VENIPUNCTURE: CPT | Performed by: INTERNAL MEDICINE

## 2022-07-13 PROCEDURE — 82306 VITAMIN D 25 HYDROXY: CPT | Performed by: INTERNAL MEDICINE

## 2022-07-13 PROCEDURE — 92004 COMPRE OPH EXAM NEW PT 1/>: CPT | Performed by: OPTOMETRIST

## 2022-07-13 PROCEDURE — 80053 COMPREHEN METABOLIC PANEL: CPT | Performed by: INTERNAL MEDICINE

## 2022-07-13 PROCEDURE — X92250 OPTOMAP: Performed by: OPTOMETRIST

## 2022-07-13 PROCEDURE — 85025 COMPLETE CBC W/AUTO DIFF WBC: CPT | Performed by: INTERNAL MEDICINE

## 2022-07-13 ASSESSMENT — REFRACTION_MANIFEST
OD_ADD: +2.25
OS_ADD: +2.25
OS_CYLINDER: +1.25
OS_SPHERE: -1.75
OD_SPHERE: -2.50
OD_CYLINDER: +1.75
OS_AXIS: 106
OD_AXIS: 089

## 2022-07-13 ASSESSMENT — REFRACTION_WEARINGRX
OD_AXIS: 090
OD_ADD: +2.00
OS_ADD: +2.00
OS_CYLINDER: +1.00
SPECS_TYPE: PROGRESSIVE
OS_SPHERE: -1.50
OD_SPHERE: -2.75
OS_AXIS: 100
OD_CYLINDER: +1.75

## 2022-07-13 ASSESSMENT — EXTERNAL EXAM - LEFT EYE: OS_EXAM: NORMAL

## 2022-07-13 ASSESSMENT — TONOMETRY
OD_IOP_MMHG: 20
IOP_METHOD: APPLANATION
OS_IOP_MMHG: 20

## 2022-07-13 ASSESSMENT — EXTERNAL EXAM - RIGHT EYE: OD_EXAM: NORMAL

## 2022-07-13 ASSESSMENT — SLIT LAMP EXAM - LIDS
COMMENTS: NORMAL
COMMENTS: NORMAL

## 2022-07-13 ASSESSMENT — VISUAL ACUITY
CORRECTION_TYPE: GLASSES
OS_CC: 20/20
OD_CC: 20/25
METHOD: SNELLEN - LINEAR
OS_CC: JAEGER 1
OD_CC: JAEGER 1

## 2022-07-13 ASSESSMENT — CUP TO DISC RATIO
OD_RATIO: 0.3
OS_RATIO: 0.3

## 2022-07-13 ASSESSMENT — CONF VISUAL FIELD
OD_NORMAL: 1
OS_NORMAL: 1

## 2022-07-21 ENCOUNTER — IMAGING SERVICES (OUTPATIENT)
Dept: ULTRASOUND IMAGING | Age: 52
End: 2022-07-21
Attending: PHYSICIAN ASSISTANT

## 2022-07-21 ENCOUNTER — TELEPHONE (OUTPATIENT)
Dept: OBGYN | Age: 52
End: 2022-07-21

## 2022-07-21 DIAGNOSIS — R92.8 ABNORMAL ULTRASOUND OF BREAST: ICD-10-CM

## 2022-07-21 DIAGNOSIS — R92.30 DENSE BREASTS: Primary | ICD-10-CM

## 2022-07-21 DIAGNOSIS — R92.8 ABNORMAL ULTRASOUND OF BREAST: Primary | ICD-10-CM

## 2022-07-21 DIAGNOSIS — R92.2 DENSE BREASTS: Primary | ICD-10-CM

## 2022-07-21 PROCEDURE — 76642 ULTRASOUND BREAST LIMITED: CPT | Performed by: RADIOLOGY

## 2022-07-27 DIAGNOSIS — M79.10 MYALGIA: ICD-10-CM

## 2022-07-27 DIAGNOSIS — R19.7 DIARRHEA DUE TO MALABSORPTION: ICD-10-CM

## 2022-07-27 DIAGNOSIS — G57.62 MORTON'S NEUROMA OF LEFT FOOT: ICD-10-CM

## 2022-07-27 DIAGNOSIS — J45.20 MILD INTERMITTENT ASTHMA WITHOUT STATUS ASTHMATICUS WITHOUT COMPLICATION: ICD-10-CM

## 2022-07-27 DIAGNOSIS — R04.0 EPISTAXIS: ICD-10-CM

## 2022-07-27 DIAGNOSIS — I10 ESSENTIAL HYPERTENSION: ICD-10-CM

## 2022-07-27 DIAGNOSIS — D05.10 DUCTAL CARCINOMA IN SITU (DCIS) OF BREAST, UNSPECIFIED LATERALITY: ICD-10-CM

## 2022-07-27 DIAGNOSIS — K90.9 DIARRHEA DUE TO MALABSORPTION: ICD-10-CM

## 2022-07-27 DIAGNOSIS — J30.1 NON-SEASONAL ALLERGIC RHINITIS DUE TO POLLEN: Chronic | ICD-10-CM

## 2022-07-27 DIAGNOSIS — J31.0 CHRONIC RHINITIS: Chronic | ICD-10-CM

## 2022-07-27 DIAGNOSIS — N39.3 STRESS INCONTINENCE, FEMALE: ICD-10-CM

## 2022-07-27 RX ORDER — CYCLOBENZAPRINE HCL 10 MG
2.5 TABLET ORAL
Qty: 30 TABLET | Refills: 5 | Status: CANCELLED | OUTPATIENT
Start: 2022-07-27

## 2022-07-28 ENCOUNTER — OFFICE VISIT (OUTPATIENT)
Dept: INTERNAL MEDICINE | Age: 52
End: 2022-07-28

## 2022-07-28 VITALS
HEIGHT: 69 IN | RESPIRATION RATE: 18 BRPM | WEIGHT: 178 LBS | HEART RATE: 76 BPM | BODY MASS INDEX: 26.36 KG/M2 | DIASTOLIC BLOOD PRESSURE: 68 MMHG | SYSTOLIC BLOOD PRESSURE: 118 MMHG | OXYGEN SATURATION: 98 %

## 2022-07-28 DIAGNOSIS — R73.01 IFG (IMPAIRED FASTING GLUCOSE): ICD-10-CM

## 2022-07-28 DIAGNOSIS — J31.0 CHRONIC RHINITIS: Chronic | ICD-10-CM

## 2022-07-28 DIAGNOSIS — M79.10 MYALGIA: ICD-10-CM

## 2022-07-28 DIAGNOSIS — D05.10 DUCTAL CARCINOMA IN SITU (DCIS) OF BREAST, UNSPECIFIED LATERALITY: ICD-10-CM

## 2022-07-28 DIAGNOSIS — Q87.81 ALPORT'S SYNDROME: Primary | ICD-10-CM

## 2022-07-28 DIAGNOSIS — E55.9 VITAMIN D DEFICIENCY: ICD-10-CM

## 2022-07-28 DIAGNOSIS — I10 ESSENTIAL HYPERTENSION: ICD-10-CM

## 2022-07-28 PROBLEM — M79.672 PAIN OF LEFT HEEL: Status: RESOLVED | Noted: 2020-12-02 | Resolved: 2022-07-28

## 2022-07-28 PROCEDURE — 99214 OFFICE O/P EST MOD 30 MIN: CPT | Performed by: INTERNAL MEDICINE

## 2022-07-28 PROCEDURE — 3074F SYST BP LT 130 MM HG: CPT | Performed by: INTERNAL MEDICINE

## 2022-07-28 PROCEDURE — 99396 PREV VISIT EST AGE 40-64: CPT | Performed by: INTERNAL MEDICINE

## 2022-07-28 PROCEDURE — 3078F DIAST BP <80 MM HG: CPT | Performed by: INTERNAL MEDICINE

## 2022-07-28 RX ORDER — ATENOLOL 25 MG/1
12.5 TABLET ORAL DAILY
Qty: 45 TABLET | Refills: 3 | Status: SHIPPED | OUTPATIENT
Start: 2022-07-28 | End: 2023-06-02

## 2022-07-28 RX ORDER — CYCLOBENZAPRINE HCL 5 MG
5 TABLET ORAL
Qty: 30 TABLET | Refills: 5 | Status: SHIPPED | OUTPATIENT
Start: 2022-07-28

## 2022-07-28 ASSESSMENT — PATIENT HEALTH QUESTIONNAIRE - PHQ9
SUM OF ALL RESPONSES TO PHQ9 QUESTIONS 1 AND 2: 0
2. FEELING DOWN, DEPRESSED OR HOPELESS: NOT AT ALL
CLINICAL INTERPRETATION OF PHQ2 SCORE: NO FURTHER SCREENING NEEDED
1. LITTLE INTEREST OR PLEASURE IN DOING THINGS: NOT AT ALL
SUM OF ALL RESPONSES TO PHQ9 QUESTIONS 1 AND 2: 0

## 2022-07-29 ENCOUNTER — IMAGING SERVICES (OUTPATIENT)
Dept: MAMMOGRAPHY | Age: 52
End: 2022-07-29
Attending: INTERNAL MEDICINE

## 2022-07-29 DIAGNOSIS — R92.8 ABNORMAL ULTRASOUND OF BREAST: ICD-10-CM

## 2022-07-29 PROCEDURE — 77066 DX MAMMO INCL CAD BI: CPT | Performed by: RADIOLOGY

## 2022-08-08 ENCOUNTER — E-ADVICE (OUTPATIENT)
Dept: NEPHROLOGY | Age: 52
End: 2022-08-08

## 2022-08-08 DIAGNOSIS — R31.29 MICROSCOPIC HEMATURIA: Primary | ICD-10-CM

## 2022-08-08 DIAGNOSIS — R80.9 PROTEINURIA, UNSPECIFIED TYPE: ICD-10-CM

## 2022-08-10 ENCOUNTER — APPOINTMENT (OUTPATIENT)
Dept: OTOLARYNGOLOGY | Age: 52
End: 2022-08-10

## 2022-08-17 ENCOUNTER — OFFICE VISIT (OUTPATIENT)
Dept: OTOLARYNGOLOGY | Age: 52
End: 2022-08-17

## 2022-08-17 ENCOUNTER — TELEPHONE (OUTPATIENT)
Dept: ALLERGY | Age: 52
End: 2022-08-17

## 2022-08-17 ENCOUNTER — TELEPHONE (OUTPATIENT)
Dept: OTOLARYNGOLOGY | Age: 52
End: 2022-08-17

## 2022-08-17 VITALS — HEIGHT: 69 IN | WEIGHT: 170 LBS | BODY MASS INDEX: 25.18 KG/M2

## 2022-08-17 DIAGNOSIS — J30.0 VASOMOTOR RHINITIS: Primary | ICD-10-CM

## 2022-08-17 DIAGNOSIS — J31.0 CHRONIC RHINITIS: ICD-10-CM

## 2022-08-17 PROCEDURE — 99203 OFFICE O/P NEW LOW 30 MIN: CPT | Performed by: OTOLARYNGOLOGY

## 2022-08-17 PROCEDURE — 31231 NASAL ENDOSCOPY DX: CPT | Performed by: OTOLARYNGOLOGY

## 2022-08-17 RX ORDER — IPRATROPIUM BROMIDE 42 UG/1
2 SPRAY, METERED NASAL 4 TIMES DAILY
Qty: 15 ML | Refills: 3 | Status: SHIPPED | OUTPATIENT
Start: 2022-08-17 | End: 2022-08-17 | Stop reason: SDUPTHER

## 2022-08-17 RX ORDER — FLUTICASONE PROPIONATE 50 MCG
2 SPRAY, SUSPENSION (ML) NASAL DAILY
Qty: 16 G | Refills: 3 | Status: SHIPPED | OUTPATIENT
Start: 2022-08-17 | End: 2022-08-30 | Stop reason: SDUPTHER

## 2022-08-17 RX ORDER — IPRATROPIUM BROMIDE 42 UG/1
2 SPRAY, METERED NASAL 4 TIMES DAILY
Qty: 15 ML | Refills: 3 | Status: SHIPPED | OUTPATIENT
Start: 2022-08-17 | End: 2023-09-15 | Stop reason: ALTCHOICE

## 2022-08-29 ENCOUNTER — E-ADVICE (OUTPATIENT)
Dept: INTERNAL MEDICINE | Age: 52
End: 2022-08-29

## 2022-08-30 RX ORDER — FLUTICASONE PROPIONATE 50 MCG
2 SPRAY, SUSPENSION (ML) NASAL DAILY
Qty: 16 G | Refills: 3 | Status: SHIPPED | OUTPATIENT
Start: 2022-08-30 | End: 2023-06-01 | Stop reason: ALTCHOICE

## 2022-09-14 ENCOUNTER — APPOINTMENT (OUTPATIENT)
Dept: DERMATOLOGY | Age: 52
End: 2022-09-14

## 2022-10-04 ENCOUNTER — E-ADVICE (OUTPATIENT)
Dept: INTERNAL MEDICINE | Age: 52
End: 2022-10-04

## 2022-10-24 ENCOUNTER — E-ADVICE (OUTPATIENT)
Dept: OBGYN | Age: 52
End: 2022-10-24

## 2022-10-25 ENCOUNTER — OFFICE VISIT (OUTPATIENT)
Dept: AUDIOLOGY | Age: 52
End: 2022-10-25

## 2022-10-25 DIAGNOSIS — H93.13 SUBJECTIVE TINNITUS OF BOTH EARS: ICD-10-CM

## 2022-10-25 DIAGNOSIS — H90.3 SENSORINEURAL HEARING LOSS (SNHL) OF BOTH EARS: Primary | ICD-10-CM

## 2022-10-25 PROCEDURE — 92556 SPEECH AUDIOMETRY COMPLETE: CPT | Performed by: AUDIOLOGIST

## 2022-10-25 PROCEDURE — 92552 PURE TONE AUDIOMETRY AIR: CPT | Performed by: AUDIOLOGIST

## 2022-12-08 ENCOUNTER — E-ADVICE (OUTPATIENT)
Dept: DERMATOLOGY | Age: 52
End: 2022-12-08

## 2022-12-08 ENCOUNTER — OFFICE VISIT (OUTPATIENT)
Dept: DERMATOLOGY | Age: 52
End: 2022-12-08

## 2022-12-08 ENCOUNTER — LAB SERVICES (OUTPATIENT)
Dept: LAB | Age: 52
End: 2022-12-08

## 2022-12-08 DIAGNOSIS — Z85.828 PERSONAL HISTORY OF SKIN CANCER: ICD-10-CM

## 2022-12-08 DIAGNOSIS — Z12.83 SKIN CANCER SCREENING: ICD-10-CM

## 2022-12-08 DIAGNOSIS — D22.9 MULTIPLE NEVI: ICD-10-CM

## 2022-12-08 DIAGNOSIS — L82.0 SEBORRHEIC KERATOSIS, INFLAMED: Primary | ICD-10-CM

## 2022-12-08 DIAGNOSIS — B35.1 ONYCHOMYCOSIS: ICD-10-CM

## 2022-12-08 PROCEDURE — 17110 DESTRUCTION B9 LES UP TO 14: CPT | Performed by: DERMATOLOGY

## 2022-12-08 PROCEDURE — 99214 OFFICE O/P EST MOD 30 MIN: CPT | Performed by: DERMATOLOGY

## 2022-12-12 ENCOUNTER — LAB SERVICES (OUTPATIENT)
Dept: LAB | Age: 52
End: 2022-12-12

## 2022-12-12 DIAGNOSIS — I10 ESSENTIAL HYPERTENSION: ICD-10-CM

## 2022-12-12 DIAGNOSIS — R80.9 PROTEINURIA, UNSPECIFIED TYPE: ICD-10-CM

## 2022-12-12 DIAGNOSIS — Q87.81 ALPORT'S SYNDROME: ICD-10-CM

## 2022-12-12 DIAGNOSIS — R31.29 MICROSCOPIC HEMATURIA: ICD-10-CM

## 2022-12-12 LAB
ANION GAP SERPL CALC-SCNC: 10 MMOL/L (ref 7–19)
APPEARANCE UR: CLEAR
BILIRUB UR QL STRIP: NEGATIVE
BUN SERPL-MCNC: 12 MG/DL (ref 6–20)
BUN/CREAT SERPL: 15 (ref 7–25)
CALCIUM SERPL-MCNC: 9.4 MG/DL (ref 8.4–10.2)
CHLORIDE SERPL-SCNC: 106 MMOL/L (ref 97–110)
CO2 SERPL-SCNC: 31 MMOL/L (ref 21–32)
COLOR UR: YELLOW
CREAT SERPL-MCNC: 0.79 MG/DL (ref 0.51–0.95)
CREAT UR-MCNC: 59.72 MG/DL
FASTING DURATION TIME PATIENT: NORMAL H
GFR SERPLBLD BASED ON 1.73 SQ M-ARVRAT: 89 ML/MIN
GLUCOSE SERPL-MCNC: 95 MG/DL (ref 70–99)
GLUCOSE UR STRIP-MCNC: NEGATIVE MG/DL
HGB UR QL STRIP: NEGATIVE
KETONES UR STRIP-MCNC: NEGATIVE MG/DL
LEUKOCYTE ESTERASE UR QL STRIP: NEGATIVE
MICROALBUMIN UR-MCNC: <0.5 MG/DL
MICROALBUMIN/CREAT UR: NORMAL MG/G{CREAT}
NITRITE UR QL STRIP: NEGATIVE
PH UR STRIP: 6 [PH] (ref 5–7)
POTASSIUM SERPL-SCNC: 4.2 MMOL/L (ref 3.4–5.1)
PROT UR STRIP-MCNC: NEGATIVE MG/DL
SODIUM SERPL-SCNC: 143 MMOL/L (ref 135–145)
SP GR UR STRIP: 1.01 (ref 1–1.03)
UROBILINOGEN UR STRIP-MCNC: 0.2 MG/DL

## 2022-12-12 PROCEDURE — 82043 UR ALBUMIN QUANTITATIVE: CPT | Performed by: INTERNAL MEDICINE

## 2022-12-12 PROCEDURE — 81003 URINALYSIS AUTO W/O SCOPE: CPT | Performed by: INTERNAL MEDICINE

## 2022-12-12 PROCEDURE — 36415 COLL VENOUS BLD VENIPUNCTURE: CPT | Performed by: INTERNAL MEDICINE

## 2022-12-12 PROCEDURE — 82570 ASSAY OF URINE CREATININE: CPT | Performed by: INTERNAL MEDICINE

## 2022-12-12 PROCEDURE — 80048 BASIC METABOLIC PNL TOTAL CA: CPT | Performed by: INTERNAL MEDICINE

## 2022-12-15 ENCOUNTER — TELEPHONE (OUTPATIENT)
Dept: NEPHROLOGY | Age: 52
End: 2022-12-15

## 2022-12-28 ENCOUNTER — TELEPHONE (OUTPATIENT)
Dept: DERMATOLOGY | Age: 52
End: 2022-12-28

## 2023-03-10 ENCOUNTER — E-ADVICE (OUTPATIENT)
Dept: INTERNAL MEDICINE | Age: 53
End: 2023-03-10

## 2023-03-10 DIAGNOSIS — F41.9 ANXIETY: Primary | ICD-10-CM

## 2023-03-30 ENCOUNTER — APPOINTMENT (OUTPATIENT)
Dept: OBGYN | Age: 53
End: 2023-03-30

## 2023-04-20 ENCOUNTER — APPOINTMENT (OUTPATIENT)
Dept: OBGYN | Age: 53
End: 2023-04-20

## 2023-04-25 ENCOUNTER — E-ADVICE (OUTPATIENT)
Dept: NEPHROLOGY | Age: 53
End: 2023-04-25

## 2023-04-25 ENCOUNTER — TELEPHONE (OUTPATIENT)
Dept: NEPHROLOGY | Age: 53
End: 2023-04-25

## 2023-05-04 ENCOUNTER — OFFICE VISIT (OUTPATIENT)
Dept: NEPHROLOGY | Age: 53
End: 2023-05-04

## 2023-05-04 VITALS
DIASTOLIC BLOOD PRESSURE: 68 MMHG | BODY MASS INDEX: 25.25 KG/M2 | HEART RATE: 72 BPM | SYSTOLIC BLOOD PRESSURE: 106 MMHG | WEIGHT: 171 LBS

## 2023-05-04 DIAGNOSIS — R31.29 MICROSCOPIC HEMATURIA: ICD-10-CM

## 2023-05-04 DIAGNOSIS — R73.01 IMPAIRED FASTING GLUCOSE: ICD-10-CM

## 2023-05-04 DIAGNOSIS — I10 ESSENTIAL HYPERTENSION: Primary | ICD-10-CM

## 2023-05-04 DIAGNOSIS — Q87.81 ALPORT'S SYNDROME: ICD-10-CM

## 2023-05-04 DIAGNOSIS — R80.9 PROTEINURIA, UNSPECIFIED TYPE: ICD-10-CM

## 2023-05-04 PROCEDURE — 3078F DIAST BP <80 MM HG: CPT | Performed by: INTERNAL MEDICINE

## 2023-05-04 PROCEDURE — 99213 OFFICE O/P EST LOW 20 MIN: CPT | Performed by: INTERNAL MEDICINE

## 2023-05-04 PROCEDURE — 3074F SYST BP LT 130 MM HG: CPT | Performed by: INTERNAL MEDICINE

## 2023-05-19 ENCOUNTER — TELEPHONE (OUTPATIENT)
Dept: OBGYN | Age: 53
End: 2023-05-19

## 2023-06-01 ENCOUNTER — OFFICE VISIT (OUTPATIENT)
Dept: OBGYN | Age: 53
End: 2023-06-01

## 2023-06-01 ENCOUNTER — LAB SERVICES (OUTPATIENT)
Dept: LAB | Age: 53
End: 2023-06-01

## 2023-06-01 VITALS
TEMPERATURE: 98 F | HEART RATE: 76 BPM | WEIGHT: 165 LBS | SYSTOLIC BLOOD PRESSURE: 114 MMHG | RESPIRATION RATE: 20 BRPM | BODY MASS INDEX: 24.44 KG/M2 | DIASTOLIC BLOOD PRESSURE: 76 MMHG | HEIGHT: 69 IN

## 2023-06-01 DIAGNOSIS — N39.3 STRESS INCONTINENCE, FEMALE: ICD-10-CM

## 2023-06-01 DIAGNOSIS — E55.9 VITAMIN D DEFICIENCY: ICD-10-CM

## 2023-06-01 DIAGNOSIS — N81.11 MIDLINE CYSTOCELE: ICD-10-CM

## 2023-06-01 DIAGNOSIS — Z12.4 PAP SMEAR FOR CERVICAL CANCER SCREENING: ICD-10-CM

## 2023-06-01 DIAGNOSIS — Z01.419 ENCOUNTER FOR GYNECOLOGICAL EXAMINATION WITHOUT ABNORMAL FINDING: Primary | ICD-10-CM

## 2023-06-01 DIAGNOSIS — Z12.31 VISIT FOR SCREENING MAMMOGRAM: ICD-10-CM

## 2023-06-01 DIAGNOSIS — Z11.51 SCREENING FOR HUMAN PAPILLOMAVIRUS (HPV): ICD-10-CM

## 2023-06-01 PROCEDURE — 99396 PREV VISIT EST AGE 40-64: CPT | Performed by: PHYSICIAN ASSISTANT

## 2023-06-01 PROCEDURE — 87624 HPV HI-RISK TYP POOLED RSLT: CPT | Performed by: INTERNAL MEDICINE

## 2023-06-01 PROCEDURE — 88142 CYTOPATH C/V THIN LAYER: CPT | Performed by: INTERNAL MEDICINE

## 2023-06-01 ASSESSMENT — ENCOUNTER SYMPTOMS
APPETITE CHANGE: 0
TROUBLE SWALLOWING: 0
CONSTIPATION: 0
BLOOD IN STOOL: 0
SHORTNESS OF BREATH: 0
EYE REDNESS: 0
UNEXPECTED WEIGHT CHANGE: 0
WHEEZING: 0
EYE DISCHARGE: 0

## 2023-06-01 ASSESSMENT — PATIENT HEALTH QUESTIONNAIRE - PHQ9
CLINICAL INTERPRETATION OF PHQ2 SCORE: NO FURTHER SCREENING NEEDED
2. FEELING DOWN, DEPRESSED OR HOPELESS: NOT AT ALL
1. LITTLE INTEREST OR PLEASURE IN DOING THINGS: NOT AT ALL
SUM OF ALL RESPONSES TO PHQ9 QUESTIONS 1 AND 2: 0
SUM OF ALL RESPONSES TO PHQ9 QUESTIONS 1 AND 2: 0

## 2023-06-02 RX ORDER — ATENOLOL 25 MG/1
TABLET ORAL
Qty: 45 TABLET | Refills: 3 | Status: SHIPPED | OUTPATIENT
Start: 2023-06-02

## 2023-06-05 LAB
CASE RPRT: NORMAL
CLINICAL INFO: NORMAL
CYTOLOGY CVX/VAG STUDY: NORMAL
CYTOLOGY CVX/VAG STUDY: NORMAL
HPV16+18+45 E6+E7MRNA CVX NAA+PROBE: NEGATIVE
Lab: NORMAL
PAP EDUCATIONAL NOTE: NORMAL
SPECIMEN ADEQUACY: NORMAL

## 2023-06-12 ENCOUNTER — TELEPHONE (OUTPATIENT)
Dept: OPTOMETRY | Age: 53
End: 2023-06-12

## 2023-06-29 ENCOUNTER — APPOINTMENT (OUTPATIENT)
Dept: OBGYN | Age: 53
End: 2023-06-29

## 2023-07-19 ENCOUNTER — LAB SERVICES (OUTPATIENT)
Dept: LAB | Age: 53
End: 2023-07-19

## 2023-07-19 DIAGNOSIS — E55.9 VITAMIN D DEFICIENCY: ICD-10-CM

## 2023-07-19 DIAGNOSIS — R73.01 IFG (IMPAIRED FASTING GLUCOSE): ICD-10-CM

## 2023-07-19 DIAGNOSIS — D05.10 DUCTAL CARCINOMA IN SITU (DCIS) OF BREAST, UNSPECIFIED LATERALITY: ICD-10-CM

## 2023-07-19 DIAGNOSIS — J31.0 CHRONIC RHINITIS: ICD-10-CM

## 2023-07-19 DIAGNOSIS — I10 ESSENTIAL HYPERTENSION: ICD-10-CM

## 2023-07-19 DIAGNOSIS — R73.01 IMPAIRED FASTING GLUCOSE: ICD-10-CM

## 2023-07-19 DIAGNOSIS — M79.10 MYALGIA: ICD-10-CM

## 2023-07-19 DIAGNOSIS — R31.29 MICROSCOPIC HEMATURIA: ICD-10-CM

## 2023-07-19 DIAGNOSIS — R80.9 PROTEINURIA, UNSPECIFIED TYPE: ICD-10-CM

## 2023-07-19 DIAGNOSIS — Q87.81 ALPORT'S SYNDROME: ICD-10-CM

## 2023-07-19 LAB
25(OH)D3+25(OH)D2 SERPL-MCNC: 38.3 NG/ML (ref 30–100)
ALBUMIN SERPL-MCNC: 4 G/DL (ref 3.6–5.1)
ALBUMIN/GLOB SERPL: 1.2 {RATIO} (ref 1–2.4)
ALP SERPL-CCNC: 84 UNITS/L (ref 45–117)
ALT SERPL-CCNC: 45 UNITS/L
ANION GAP SERPL CALC-SCNC: 15 MMOL/L (ref 7–19)
APPEARANCE UR: CLEAR
AST SERPL-CCNC: 31 UNITS/L
BASOPHILS # BLD: 0.1 K/MCL (ref 0–0.3)
BASOPHILS NFR BLD: 1 %
BILIRUB SERPL-MCNC: 0.4 MG/DL (ref 0.2–1)
BILIRUB UR QL STRIP: NEGATIVE
BUN SERPL-MCNC: 13 MG/DL (ref 6–20)
BUN/CREAT SERPL: 16 (ref 7–25)
CALCIUM SERPL-MCNC: 9 MG/DL (ref 8.4–10.2)
CHLORIDE SERPL-SCNC: 104 MMOL/L (ref 97–110)
CHOLEST SERPL-MCNC: 177 MG/DL
CHOLEST/HDLC SERPL: 2.2 {RATIO}
CO2 SERPL-SCNC: 29 MMOL/L (ref 21–32)
COLOR UR: YELLOW
CREAT SERPL-MCNC: 0.79 MG/DL (ref 0.51–0.95)
CREAT UR-MCNC: 53.96 MG/DL
DEPRECATED RDW RBC: 45.8 FL (ref 39–50)
EOSINOPHIL # BLD: 0.1 K/MCL (ref 0–0.5)
EOSINOPHIL NFR BLD: 2 %
ERYTHROCYTE [DISTWIDTH] IN BLOOD: 12.7 % (ref 11–15)
FASTING DURATION TIME PATIENT: 10 HOURS (ref 0–999)
GFR SERPLBLD BASED ON 1.73 SQ M-ARVRAT: 89 ML/MIN
GLOBULIN SER-MCNC: 3.4 G/DL (ref 2–4)
GLUCOSE SERPL-MCNC: 90 MG/DL (ref 70–99)
GLUCOSE UR STRIP-MCNC: NEGATIVE MG/DL
HBA1C MFR BLD: 4.8 % (ref 4.5–5.6)
HCT VFR BLD CALC: 40.4 % (ref 36–46.5)
HDLC SERPL-MCNC: 79 MG/DL
HGB BLD-MCNC: 13 G/DL (ref 12–15.5)
HGB UR QL STRIP: NEGATIVE
IMM GRANULOCYTES # BLD AUTO: 0 K/MCL (ref 0–0.2)
IMM GRANULOCYTES # BLD: 0 %
KETONES UR STRIP-MCNC: NEGATIVE MG/DL
LDLC SERPL CALC-MCNC: 84 MG/DL
LEUKOCYTE ESTERASE UR QL STRIP: NEGATIVE
LYMPHOCYTES # BLD: 2.3 K/MCL (ref 1–4)
LYMPHOCYTES NFR BLD: 48 %
MCH RBC QN AUTO: 31.5 PG (ref 26–34)
MCHC RBC AUTO-ENTMCNC: 32.2 G/DL (ref 32–36.5)
MCV RBC AUTO: 97.8 FL (ref 78–100)
MICROALBUMIN UR-MCNC: <0.5 MG/DL
MICROALBUMIN/CREAT UR: NORMAL MG/G{CREAT}
MONOCYTES # BLD: 0.4 K/MCL (ref 0.3–0.9)
MONOCYTES NFR BLD: 8 %
NEUTROPHILS # BLD: 2 K/MCL (ref 1.8–7.7)
NEUTROPHILS NFR BLD: 41 %
NITRITE UR QL STRIP: NEGATIVE
NONHDLC SERPL-MCNC: 98 MG/DL
NRBC BLD MANUAL-RTO: 0 /100 WBC
PH UR STRIP: 6 [PH] (ref 5–7)
PLATELET # BLD AUTO: 222 K/MCL (ref 140–450)
POTASSIUM SERPL-SCNC: 4 MMOL/L (ref 3.4–5.1)
PROT SERPL-MCNC: 7.4 G/DL (ref 6.4–8.2)
PROT UR STRIP-MCNC: NEGATIVE MG/DL
RBC # BLD: 4.13 MIL/MCL (ref 4–5.2)
SODIUM SERPL-SCNC: 144 MMOL/L (ref 135–145)
SP GR UR STRIP: 1.01 (ref 1–1.03)
TRIGL SERPL-MCNC: 68 MG/DL
UROBILINOGEN UR STRIP-MCNC: 0.2 MG/DL
WBC # BLD: 4.9 K/MCL (ref 4.2–11)

## 2023-07-19 PROCEDURE — 36415 COLL VENOUS BLD VENIPUNCTURE: CPT | Performed by: INTERNAL MEDICINE

## 2023-07-19 PROCEDURE — 82043 UR ALBUMIN QUANTITATIVE: CPT | Performed by: INTERNAL MEDICINE

## 2023-07-19 PROCEDURE — 82306 VITAMIN D 25 HYDROXY: CPT | Performed by: INTERNAL MEDICINE

## 2023-07-19 PROCEDURE — 85025 COMPLETE CBC W/AUTO DIFF WBC: CPT | Performed by: INTERNAL MEDICINE

## 2023-07-19 PROCEDURE — 82570 ASSAY OF URINE CREATININE: CPT | Performed by: INTERNAL MEDICINE

## 2023-07-19 PROCEDURE — 80061 LIPID PANEL: CPT | Performed by: INTERNAL MEDICINE

## 2023-07-19 PROCEDURE — 80053 COMPREHEN METABOLIC PANEL: CPT | Performed by: INTERNAL MEDICINE

## 2023-07-19 PROCEDURE — 83036 HEMOGLOBIN GLYCOSYLATED A1C: CPT | Performed by: INTERNAL MEDICINE

## 2023-07-19 PROCEDURE — 81003 URINALYSIS AUTO W/O SCOPE: CPT | Performed by: INTERNAL MEDICINE

## 2023-07-20 ENCOUNTER — E-ADVICE (OUTPATIENT)
Dept: INTERNAL MEDICINE | Age: 53
End: 2023-07-20

## 2023-08-03 ENCOUNTER — APPOINTMENT (OUTPATIENT)
Dept: INTERNAL MEDICINE | Age: 53
End: 2023-08-03

## 2023-08-15 ENCOUNTER — IMAGING SERVICES (OUTPATIENT)
Dept: MAMMOGRAPHY | Age: 53
End: 2023-08-15
Attending: PHYSICIAN ASSISTANT

## 2023-08-15 DIAGNOSIS — R92.2 DENSE BREASTS: Primary | ICD-10-CM

## 2023-08-15 DIAGNOSIS — Z12.31 VISIT FOR SCREENING MAMMOGRAM: ICD-10-CM

## 2023-08-15 DIAGNOSIS — Z12.39 ENCOUNTER FOR OTHER SCREENING FOR MALIGNANT NEOPLASM OF BREAST: ICD-10-CM

## 2023-08-15 DIAGNOSIS — R92.30 DENSE BREASTS: Primary | ICD-10-CM

## 2023-08-15 PROCEDURE — 77063 BREAST TOMOSYNTHESIS BI: CPT | Performed by: RADIOLOGY

## 2023-08-15 PROCEDURE — 77067 SCR MAMMO BI INCL CAD: CPT | Performed by: RADIOLOGY

## 2023-09-15 ENCOUNTER — APPOINTMENT (OUTPATIENT)
Dept: FAMILY MEDICINE | Age: 53
End: 2023-09-15

## 2023-09-15 ENCOUNTER — OFFICE VISIT (OUTPATIENT)
Dept: FAMILY MEDICINE | Age: 53
End: 2023-09-15

## 2023-09-15 VITALS
OXYGEN SATURATION: 100 % | RESPIRATION RATE: 18 BRPM | WEIGHT: 175.8 LBS | TEMPERATURE: 98 F | HEART RATE: 56 BPM | SYSTOLIC BLOOD PRESSURE: 113 MMHG | DIASTOLIC BLOOD PRESSURE: 76 MMHG | HEIGHT: 69 IN | BODY MASS INDEX: 26.04 KG/M2

## 2023-09-15 DIAGNOSIS — Z01.419 WELL WOMAN EXAM: Primary | ICD-10-CM

## 2023-09-15 DIAGNOSIS — Z12.11 COLON CANCER SCREENING: ICD-10-CM

## 2023-09-15 DIAGNOSIS — Q87.81 ALPORT'S SYNDROME: ICD-10-CM

## 2023-09-15 PROCEDURE — 99396 PREV VISIT EST AGE 40-64: CPT | Performed by: FAMILY MEDICINE

## 2023-09-15 ASSESSMENT — PATIENT HEALTH QUESTIONNAIRE - PHQ9
1. LITTLE INTEREST OR PLEASURE IN DOING THINGS: NOT AT ALL
SUM OF ALL RESPONSES TO PHQ9 QUESTIONS 1 AND 2: 0
SUM OF ALL RESPONSES TO PHQ9 QUESTIONS 1 AND 2: 0
CLINICAL INTERPRETATION OF PHQ2 SCORE: NO FURTHER SCREENING NEEDED
2. FEELING DOWN, DEPRESSED OR HOPELESS: NOT AT ALL

## 2023-10-02 ENCOUNTER — E-ADVICE (OUTPATIENT)
Dept: NEPHROLOGY | Age: 53
End: 2023-10-02

## 2023-10-05 ENCOUNTER — E-ADVICE (OUTPATIENT)
Dept: FAMILY MEDICINE | Age: 53
End: 2023-10-05

## 2023-11-08 ENCOUNTER — TELEPHONE (OUTPATIENT)
Dept: GASTROENTEROLOGY | Age: 53
End: 2023-11-08

## 2023-11-08 DIAGNOSIS — Z86.010 PERSONAL HISTORY OF COLONIC POLYPS: Primary | ICD-10-CM

## 2023-11-08 RX ORDER — BISACODYL 5 MG/1
TABLET, DELAYED RELEASE ORAL
Qty: 2 TABLET | Refills: 0 | Status: SHIPPED | OUTPATIENT
Start: 2023-11-08 | End: 2023-12-23

## 2023-11-08 RX ORDER — SIMETHICONE 125 MG
TABLET,CHEWABLE ORAL
Qty: 2 TABLET | Refills: 0 | Status: SHIPPED | OUTPATIENT
Start: 2023-11-08 | End: 2023-12-23

## 2023-12-13 ENCOUNTER — APPOINTMENT (OUTPATIENT)
Dept: OPTOMETRY | Age: 53
End: 2023-12-13

## 2023-12-13 DIAGNOSIS — H52.13 MYOPIC ASTIGMATISM, BILATERAL: Primary | ICD-10-CM

## 2023-12-13 DIAGNOSIS — H52.203 MYOPIC ASTIGMATISM, BILATERAL: Primary | ICD-10-CM

## 2023-12-13 DIAGNOSIS — H52.4 PRESBYOPIA: ICD-10-CM

## 2023-12-13 PROCEDURE — 92015 DETERMINE REFRACTIVE STATE: CPT | Performed by: OPTOMETRIST

## 2023-12-13 PROCEDURE — 92014 COMPRE OPH EXAM EST PT 1/>: CPT | Performed by: OPTOMETRIST

## 2023-12-13 ASSESSMENT — REFRACTION_WEARINGRX
OD_CYLINDER: +1.50
OD_AXIS: 088
OS_ADD: +2.25
OS_AXIS: 104
OS_SPHERE: -1.50
OD_ADD: +2.25
OD_SPHERE: -2.25
OS_CYLINDER: +1.00

## 2023-12-13 ASSESSMENT — CONF VISUAL FIELD
OD_INFERIOR_NASAL_RESTRICTION: 0
OD_SUPERIOR_NASAL_RESTRICTION: 0
OS_NORMAL: 1
OS_INFERIOR_TEMPORAL_RESTRICTION: 0
OD_NORMAL: 1
OS_SUPERIOR_NASAL_RESTRICTION: 0
OD_INFERIOR_TEMPORAL_RESTRICTION: 0
OD_SUPERIOR_TEMPORAL_RESTRICTION: 0
OS_SUPERIOR_TEMPORAL_RESTRICTION: 0
OS_INFERIOR_NASAL_RESTRICTION: 0

## 2023-12-13 ASSESSMENT — VISUAL ACUITY
METHOD: SNELLEN - LINEAR
OD_CC: 20/20
OD_CC: JAEGER 1
CORRECTION_TYPE: GLASSES
OS_CC: 20/20

## 2023-12-13 ASSESSMENT — REFRACTION_MANIFEST
OD_CYLINDER: +2.00
OD_ADD: +2.25
OD_AXIS: 091
OS_CYLINDER: +1.50
OS_AXIS: 108
OS_ADD: +2.25
OD_SPHERE: -2.50
OS_SPHERE: -1.75

## 2023-12-13 ASSESSMENT — EXTERNAL EXAM - RIGHT EYE: OD_EXAM: NORMAL

## 2023-12-13 ASSESSMENT — TONOMETRY
OD_IOP_MMHG: 20
OS_IOP_MMHG: 20
IOP_METHOD: APPLANATION

## 2023-12-13 ASSESSMENT — SLIT LAMP EXAM - LIDS
COMMENTS: NORMAL
COMMENTS: NORMAL

## 2023-12-13 ASSESSMENT — CUP TO DISC RATIO
OD_RATIO: 0.2
OS_RATIO: 0.2

## 2023-12-13 ASSESSMENT — EXTERNAL EXAM - LEFT EYE: OS_EXAM: NORMAL

## 2024-01-05 ENCOUNTER — TELEPHONE (OUTPATIENT)
Dept: OPTOMETRY | Age: 54
End: 2024-01-05

## 2024-01-05 ENCOUNTER — E-ADVICE (OUTPATIENT)
Dept: FAMILY MEDICINE | Age: 54
End: 2024-01-05

## 2024-01-20 ENCOUNTER — TELEPHONE (OUTPATIENT)
Dept: OPTOMETRY | Age: 54
End: 2024-01-20

## 2024-01-31 ENCOUNTER — APPOINTMENT (OUTPATIENT)
Dept: DERMATOLOGY | Age: 54
End: 2024-01-31

## 2024-01-31 ENCOUNTER — APPOINTMENT (OUTPATIENT)
Dept: OPTOMETRY | Age: 54
End: 2024-01-31

## 2024-01-31 ENCOUNTER — TELEPHONE (OUTPATIENT)
Dept: DERMATOLOGY | Age: 54
End: 2024-01-31

## 2024-01-31 DIAGNOSIS — Z12.83 SKIN CANCER SCREENING: ICD-10-CM

## 2024-01-31 DIAGNOSIS — D23.30 BENIGN NEOPLASM OF SKIN OF FACE: ICD-10-CM

## 2024-01-31 DIAGNOSIS — H52.13 MYOPIC ASTIGMATISM, BILATERAL: Primary | ICD-10-CM

## 2024-01-31 DIAGNOSIS — L81.4 LENTIGINES: ICD-10-CM

## 2024-01-31 DIAGNOSIS — L82.1 OTHER SEBORRHEIC KERATOSIS: ICD-10-CM

## 2024-01-31 DIAGNOSIS — H52.203 MYOPIC ASTIGMATISM, BILATERAL: Primary | ICD-10-CM

## 2024-01-31 DIAGNOSIS — Z85.828 PERSONAL HISTORY OF SKIN CANCER: Primary | ICD-10-CM

## 2024-01-31 PROCEDURE — 99213 OFFICE O/P EST LOW 20 MIN: CPT | Performed by: DERMATOLOGY

## 2024-01-31 ASSESSMENT — REFRACTION_WEARINGRX
OS_SPHERE: -1.50
OS_CYLINDER: +1.25
OS_ADD: +2.25
OD_ADD: +2.25
OS_AXIS: 106
OD_CYLINDER: +1.75
OD_AXIS: 096
OD_SPHERE: -2.25

## 2024-01-31 ASSESSMENT — VISUAL ACUITY
OS_CC: 20/20
OD_CC: JAEGER 1+
METHOD: SNELLEN - LINEAR
CORRECTION_TYPE: GLASSES
OD_CC: 20/20

## 2024-01-31 ASSESSMENT — REFRACTION_MANIFEST
OS_SPHERE: -2.25
OS_ADD: +2.25
OD_CYLINDER: +2.00
OD_ADD: +2.25
OS_AXIS: 109
OD_AXIS: 088
OS_CYLINDER: +1.75
OD_SPHERE: -2.50

## 2024-02-18 ENCOUNTER — E-ADVICE (OUTPATIENT)
Dept: DERMATOLOGY | Age: 54
End: 2024-02-18

## 2024-02-20 ENCOUNTER — TELEPHONE (OUTPATIENT)
Dept: OPTOMETRY | Age: 54
End: 2024-02-20

## 2024-02-22 ENCOUNTER — TELEPHONE (OUTPATIENT)
Dept: OPTOMETRY | Age: 54
End: 2024-02-22

## 2024-02-27 ENCOUNTER — E-ADVICE (OUTPATIENT)
Dept: ADMINISTRATIVE | Age: 54
End: 2024-02-27

## 2024-02-28 ENCOUNTER — APPOINTMENT (OUTPATIENT)
Dept: OPTOMETRY | Age: 54
End: 2024-02-28

## 2024-02-28 DIAGNOSIS — H52.203 MYOPIC ASTIGMATISM, BILATERAL: Primary | ICD-10-CM

## 2024-02-28 DIAGNOSIS — H52.13 MYOPIC ASTIGMATISM, BILATERAL: Primary | ICD-10-CM

## 2024-02-28 ASSESSMENT — REFRACTION_MANIFEST
OS_CYLINDER: +1.75
OD_AXIS: 091
OS_SPHERE: -1.75
OD_SPHERE: -2.50
OD_CYLINDER: +2.25
OS_AXIS: 107
OD_ADD: +2.25

## 2024-02-28 ASSESSMENT — VISUAL ACUITY
OS_CC: 20/20
CORRECTION_TYPE: GLASSES
OS_CC: JAEGER 5
METHOD: SNELLEN - LINEAR
OD_CC: JAEGER 5
OD_CC: 20/50

## 2024-02-28 ASSESSMENT — REFRACTION_WEARINGRX
OD_ADD: +2.25
OS_AXIS: 110
OD_AXIS: 092
OS_SPHERE: -1.50
OS_ADD: +2.25
OS_CYLINDER: +1.50
OD_SPHERE: -2.25
OD_CYLINDER: +2.00

## 2024-03-04 ENCOUNTER — E-ADVICE (OUTPATIENT)
Dept: GASTROENTEROLOGY | Age: 54
End: 2024-03-04

## 2024-03-07 ENCOUNTER — ANESTHESIA EVENT (OUTPATIENT)
Dept: GASTROENTEROLOGY | Age: 54
End: 2024-03-07

## 2024-03-08 ENCOUNTER — APPOINTMENT (OUTPATIENT)
Dept: GASTROENTEROLOGY | Age: 54
End: 2024-03-08
Attending: INTERNAL MEDICINE

## 2024-03-08 ENCOUNTER — ANESTHESIA (OUTPATIENT)
Dept: GASTROENTEROLOGY | Age: 54
End: 2024-03-08

## 2024-03-08 VITALS
HEIGHT: 69 IN | OXYGEN SATURATION: 97 % | SYSTOLIC BLOOD PRESSURE: 125 MMHG | BODY MASS INDEX: 25.92 KG/M2 | TEMPERATURE: 97 F | HEART RATE: 85 BPM | RESPIRATION RATE: 18 BRPM | DIASTOLIC BLOOD PRESSURE: 65 MMHG | WEIGHT: 175 LBS

## 2024-03-08 DIAGNOSIS — Z86.010 PERSONAL HISTORY OF COLONIC POLYPS: ICD-10-CM

## 2024-03-08 DIAGNOSIS — K57.30 DIVERTICULOSIS OF LARGE INTESTINE WITHOUT PERFORATION OR ABSCESS WITHOUT BLEEDING: Primary | ICD-10-CM

## 2024-03-08 PROCEDURE — G0105 COLORECTAL SCRN; HI RISK IND: HCPCS | Performed by: CLINIC/CENTER

## 2024-03-08 PROCEDURE — G0105 COLORECTAL SCRN; HI RISK IND: HCPCS | Performed by: INTERNAL MEDICINE

## 2024-03-08 RX ORDER — NICOTINE POLACRILEX 4 MG
30 LOZENGE BUCCAL
Status: DISCONTINUED | OUTPATIENT
Start: 2024-03-08 | End: 2024-03-10 | Stop reason: HOSPADM

## 2024-03-08 RX ORDER — SODIUM CHLORIDE, SODIUM LACTATE, POTASSIUM CHLORIDE, CALCIUM CHLORIDE 600; 310; 30; 20 MG/100ML; MG/100ML; MG/100ML; MG/100ML
INJECTION, SOLUTION INTRAVENOUS CONTINUOUS
Status: DISCONTINUED | OUTPATIENT
Start: 2024-03-08 | End: 2024-03-10 | Stop reason: HOSPADM

## 2024-03-08 RX ORDER — DEXTROSE MONOHYDRATE 25 G/50ML
25 INJECTION, SOLUTION INTRAVENOUS PRN
Status: DISCONTINUED | OUTPATIENT
Start: 2024-03-08 | End: 2024-03-10 | Stop reason: HOSPADM

## 2024-03-08 RX ORDER — DEXTROSE MONOHYDRATE 50 MG/ML
INJECTION, SOLUTION INTRAVENOUS CONTINUOUS PRN
Status: DISCONTINUED | OUTPATIENT
Start: 2024-03-08 | End: 2024-03-10 | Stop reason: HOSPADM

## 2024-03-08 RX ORDER — PROPOFOL 10 MG/ML
INJECTION, EMULSION INTRAVENOUS PRN
Status: DISCONTINUED | OUTPATIENT
Start: 2024-03-08 | End: 2024-03-08

## 2024-03-08 RX ORDER — 0.9 % SODIUM CHLORIDE 0.9 %
2 VIAL (ML) INJECTION EVERY 12 HOURS SCHEDULED
Status: DISCONTINUED | OUTPATIENT
Start: 2024-03-08 | End: 2024-03-10 | Stop reason: HOSPADM

## 2024-03-08 RX ORDER — SODIUM CHLORIDE 9 MG/ML
INJECTION, SOLUTION INTRAVENOUS CONTINUOUS
Status: DISCONTINUED | OUTPATIENT
Start: 2024-03-08 | End: 2024-03-10 | Stop reason: HOSPADM

## 2024-03-08 RX ORDER — HUMAN INSULIN 100 [IU]/ML
INJECTION, SOLUTION SUBCUTANEOUS
Status: DISCONTINUED | OUTPATIENT
Start: 2024-03-08 | End: 2024-03-10 | Stop reason: HOSPADM

## 2024-03-08 RX ADMIN — PROPOFOL 40 MG: 10 INJECTION, EMULSION INTRAVENOUS at 11:17

## 2024-03-08 RX ADMIN — PROPOFOL 40 MG: 10 INJECTION, EMULSION INTRAVENOUS at 11:04

## 2024-03-08 RX ADMIN — PROPOFOL 50 MG: 10 INJECTION, EMULSION INTRAVENOUS at 10:58

## 2024-03-08 RX ADMIN — PROPOFOL 40 MG: 10 INJECTION, EMULSION INTRAVENOUS at 11:21

## 2024-03-08 RX ADMIN — PROPOFOL 40 MG: 10 INJECTION, EMULSION INTRAVENOUS at 11:13

## 2024-03-08 RX ADMIN — PROPOFOL 40 MG: 10 INJECTION, EMULSION INTRAVENOUS at 11:09

## 2024-03-08 RX ADMIN — PROPOFOL 40 MG: 10 INJECTION, EMULSION INTRAVENOUS at 11:06

## 2024-03-08 RX ADMIN — PROPOFOL 50 MG: 10 INJECTION, EMULSION INTRAVENOUS at 11:00

## 2024-03-08 RX ADMIN — PROPOFOL 50 MG: 10 INJECTION, EMULSION INTRAVENOUS at 10:57

## 2024-03-08 RX ADMIN — PROPOFOL 50 MG: 10 INJECTION, EMULSION INTRAVENOUS at 11:02

## 2024-03-08 RX ADMIN — PROPOFOL 40 MG: 10 INJECTION, EMULSION INTRAVENOUS at 11:25

## 2024-03-08 RX ADMIN — SODIUM CHLORIDE, SODIUM LACTATE, POTASSIUM CHLORIDE, CALCIUM CHLORIDE: 600; 310; 30; 20 INJECTION, SOLUTION INTRAVENOUS at 10:06

## 2024-03-08 ASSESSMENT — PAIN SCALES - GENERAL: PAINLEVEL_OUTOF10: 0

## 2024-03-08 ASSESSMENT — ACTIVITIES OF DAILY LIVING (ADL)
HISTORY OF FALLING IN THE LAST YEAR (PRIOR TO ADMISSION): NO
ADL_BEFORE_ADMISSION: INDEPENDENT
NEEDS_ASSIST: NO
ADL_SCORE: 12

## 2024-03-18 ENCOUNTER — TELEPHONE (OUTPATIENT)
Dept: OPTOMETRY | Age: 54
End: 2024-03-18

## 2024-03-19 ENCOUNTER — APPOINTMENT (OUTPATIENT)
Dept: AUDIOLOGY | Age: 54
End: 2024-03-19

## 2024-03-19 DIAGNOSIS — H90.3 SENSORINEURAL HEARING LOSS (SNHL) OF BOTH EARS: Primary | ICD-10-CM

## 2024-03-19 DIAGNOSIS — H93.13 SUBJECTIVE TINNITUS OF BOTH EARS: ICD-10-CM

## 2024-03-19 PROCEDURE — 92557 COMPREHENSIVE HEARING TEST: CPT | Performed by: AUDIOLOGIST

## 2024-03-21 ENCOUNTER — E-ADVICE (OUTPATIENT)
Dept: AUDIOLOGY | Age: 54
End: 2024-03-21

## 2024-03-25 ENCOUNTER — TELEPHONE (OUTPATIENT)
Dept: AUDIOLOGY | Age: 54
End: 2024-03-25

## 2024-03-28 ENCOUNTER — TELEPHONE (OUTPATIENT)
Dept: AUDIOLOGY | Age: 54
End: 2024-03-28

## 2024-03-28 ENCOUNTER — E-ADVICE (OUTPATIENT)
Dept: DERMATOLOGY | Age: 54
End: 2024-03-28

## 2024-04-01 ENCOUNTER — TELEPHONE (OUTPATIENT)
Dept: INTERNAL MEDICINE | Age: 54
End: 2024-04-01

## 2024-04-01 ENCOUNTER — OFFICE VISIT (OUTPATIENT)
Dept: INTERNAL MEDICINE | Age: 54
End: 2024-04-01

## 2024-04-01 ENCOUNTER — LAB SERVICES (OUTPATIENT)
Dept: LAB | Age: 54
End: 2024-04-01

## 2024-04-01 VITALS
RESPIRATION RATE: 16 BRPM | OXYGEN SATURATION: 100 % | WEIGHT: 181 LBS | DIASTOLIC BLOOD PRESSURE: 74 MMHG | HEIGHT: 69 IN | BODY MASS INDEX: 26.81 KG/M2 | SYSTOLIC BLOOD PRESSURE: 110 MMHG | HEART RATE: 74 BPM | TEMPERATURE: 96.2 F

## 2024-04-01 DIAGNOSIS — K13.79 MOUTH SORES: ICD-10-CM

## 2024-04-01 DIAGNOSIS — K13.79 MOUTH SORES: Primary | ICD-10-CM

## 2024-04-01 LAB
ALBUMIN SERPL-MCNC: 3.8 G/DL (ref 3.6–5.1)
ALBUMIN/GLOB SERPL: 1.1 {RATIO} (ref 1–2.4)
ALP SERPL-CCNC: 85 UNITS/L (ref 45–117)
ALT SERPL-CCNC: 33 UNITS/L
ANION GAP SERPL CALC-SCNC: 9 MMOL/L (ref 7–19)
AST SERPL-CCNC: 19 UNITS/L
BASOPHILS # BLD: 0.1 K/MCL (ref 0–0.3)
BASOPHILS NFR BLD: 1 %
BILIRUB SERPL-MCNC: 0.4 MG/DL (ref 0.2–1)
BUN SERPL-MCNC: 12 MG/DL (ref 6–20)
BUN/CREAT SERPL: 16 (ref 7–25)
CALCIUM SERPL-MCNC: 9.5 MG/DL (ref 8.4–10.2)
CHLORIDE SERPL-SCNC: 102 MMOL/L (ref 97–110)
CO2 SERPL-SCNC: 33 MMOL/L (ref 21–32)
CREAT SERPL-MCNC: 0.75 MG/DL (ref 0.51–0.95)
CRP SERPL-MCNC: <3 MG/L
DEPRECATED RDW RBC: 44.7 FL (ref 39–50)
EGFRCR SERPLBLD CKD-EPI 2021: >90 ML/MIN/{1.73_M2}
EOSINOPHIL # BLD: 0.1 K/MCL (ref 0–0.5)
EOSINOPHIL NFR BLD: 1 %
ERYTHROCYTE [DISTWIDTH] IN BLOOD: 12.8 % (ref 11–15)
ERYTHROCYTE [SEDIMENTATION RATE] IN BLOOD BY WESTERGREN METHOD: 24 MM/HR (ref 0–20)
FASTING DURATION TIME PATIENT: 0 HOURS (ref 0–999)
GLOBULIN SER-MCNC: 3.5 G/DL (ref 2–4)
GLUCOSE SERPL-MCNC: 93 MG/DL (ref 70–99)
HCT VFR BLD CALC: 38.8 % (ref 36–46.5)
HGB BLD-MCNC: 13 G/DL (ref 12–15.5)
IMM GRANULOCYTES # BLD AUTO: 0 K/MCL (ref 0–0.2)
IMM GRANULOCYTES # BLD: 0 %
LYMPHOCYTES # BLD: 1.9 K/MCL (ref 1–4)
LYMPHOCYTES NFR BLD: 31 %
MCH RBC QN AUTO: 32 PG (ref 26–34)
MCHC RBC AUTO-ENTMCNC: 33.5 G/DL (ref 32–36.5)
MCV RBC AUTO: 95.6 FL (ref 78–100)
MONOCYTES # BLD: 0.5 K/MCL (ref 0.3–0.9)
MONOCYTES NFR BLD: 8 %
NEUTROPHILS # BLD: 3.6 K/MCL (ref 1.8–7.7)
NEUTROPHILS NFR BLD: 59 %
NRBC BLD MANUAL-RTO: 0 /100 WBC
PLATELET # BLD AUTO: 220 K/MCL (ref 140–450)
POTASSIUM SERPL-SCNC: 4.2 MMOL/L (ref 3.4–5.1)
PROT SERPL-MCNC: 7.3 G/DL (ref 6.4–8.2)
RBC # BLD: 4.06 MIL/MCL (ref 4–5.2)
SODIUM SERPL-SCNC: 140 MMOL/L (ref 135–145)
WBC # BLD: 6.2 K/MCL (ref 4.2–11)

## 2024-04-01 PROCEDURE — 80053 COMPREHEN METABOLIC PANEL: CPT | Performed by: INTERNAL MEDICINE

## 2024-04-01 PROCEDURE — 85652 RBC SED RATE AUTOMATED: CPT | Performed by: INTERNAL MEDICINE

## 2024-04-01 PROCEDURE — 85025 COMPLETE CBC W/AUTO DIFF WBC: CPT | Performed by: INTERNAL MEDICINE

## 2024-04-01 PROCEDURE — 86003 ALLG SPEC IGE CRUDE XTRC EA: CPT | Performed by: CLINICAL MEDICAL LABORATORY

## 2024-04-01 PROCEDURE — 86140 C-REACTIVE PROTEIN: CPT | Performed by: CLINICAL MEDICAL LABORATORY

## 2024-04-01 PROCEDURE — 36415 COLL VENOUS BLD VENIPUNCTURE: CPT | Performed by: STUDENT IN AN ORGANIZED HEALTH CARE EDUCATION/TRAINING PROGRAM

## 2024-04-01 PROCEDURE — 99213 OFFICE O/P EST LOW 20 MIN: CPT | Performed by: STUDENT IN AN ORGANIZED HEALTH CARE EDUCATION/TRAINING PROGRAM

## 2024-04-01 RX ORDER — VALACYCLOVIR HYDROCHLORIDE 1 G/1
2000 TABLET, FILM COATED ORAL 2 TIMES DAILY
Qty: 4 TABLET | Refills: 0 | Status: SHIPPED | OUTPATIENT
Start: 2024-04-01 | End: 2024-04-02

## 2024-04-01 RX ORDER — VALACYCLOVIR HYDROCHLORIDE 1 G/1
2000 TABLET, FILM COATED ORAL 2 TIMES DAILY
Qty: 3 TABLET | Refills: 0 | Status: SHIPPED | OUTPATIENT
Start: 2024-04-01 | End: 2024-04-01 | Stop reason: SDUPTHER

## 2024-04-01 RX ORDER — PREDNISONE 50 MG/1
50 TABLET ORAL DAILY
Qty: 5 TABLET | Refills: 0 | Status: SHIPPED | OUTPATIENT
Start: 2024-04-01

## 2024-04-03 ENCOUNTER — E-ADVICE (OUTPATIENT)
Dept: INTERNAL MEDICINE | Age: 54
End: 2024-04-03

## 2024-04-03 LAB
CLAM IGE QN: <0.35 KU/L
CODFISH IGE QN: <0.35 KU/L
CORN IGE QN: <0.35 KU/L
COW MILK IGE QN: <0.35 KU/L
DEPRECATED CLAM IGE RAST QL: NORMAL
DEPRECATED CODFISH IGE RAST QL: NORMAL
DEPRECATED CORN IGE RAST QL: NORMAL
DEPRECATED COW MILK IGE RAST QL: NORMAL
DEPRECATED EGG WHITE IGE RAST QL: NORMAL
DEPRECATED PEANUT IGE RAST QL: NORMAL
DEPRECATED SCALLOP IGE RAST QL: NORMAL
DEPRECATED SHRIMP IGE RAST QL: NORMAL
DEPRECATED SOYBEAN IGE RAST QL: NORMAL
DEPRECATED WALNUT IGE RAST QL: NORMAL
DEPRECATED WHEAT IGE RAST QL: NORMAL
EGG WHITE IGE QN: <0.35 KU/L
PEANUT IGE QN: <0.35 KU/L
SCALLOP IGE QN: <0.35 KU/L
SHRIMP IGE QN: <0.35 KU/L
SOYBEAN IGE QN: <0.35 KU/L
WALNUT IGE QN: <0.35 KU/L
WHEAT IGE QN: <0.35 KU/L

## 2024-04-04 ENCOUNTER — E-ADVICE (OUTPATIENT)
Dept: ADMINISTRATIVE | Age: 54
End: 2024-04-04

## 2024-04-06 ENCOUNTER — TELEPHONE (OUTPATIENT)
Dept: INTERNAL MEDICINE | Age: 54
End: 2024-04-06

## 2024-04-06 RX ORDER — VALACYCLOVIR HYDROCHLORIDE 500 MG/1
TABLET, FILM COATED ORAL
Qty: 30 TABLET | Refills: 3 | Status: SHIPPED | OUTPATIENT
Start: 2024-04-06

## 2024-04-06 RX ORDER — VALACYCLOVIR HYDROCHLORIDE 500 MG/1
500 TABLET, FILM COATED ORAL 2 TIMES DAILY
Qty: 30 TABLET | Refills: 3 | Status: SHIPPED | OUTPATIENT
Start: 2024-04-06 | End: 2024-04-06 | Stop reason: DRUGHIGH

## 2024-04-15 ENCOUNTER — E-ADVICE (OUTPATIENT)
Dept: INTERNAL MEDICINE | Age: 54
End: 2024-04-15

## 2024-04-15 DIAGNOSIS — K13.79 MOUTH SORES: Primary | ICD-10-CM

## 2024-04-16 ENCOUNTER — E-ADVICE (OUTPATIENT)
Dept: FAMILY MEDICINE | Age: 54
End: 2024-04-16

## 2024-04-16 DIAGNOSIS — Q87.81 ALPORT'S SYNDROME: ICD-10-CM

## 2024-04-16 DIAGNOSIS — Z01.419 WELL WOMAN EXAM: Primary | ICD-10-CM

## 2024-04-18 ENCOUNTER — TELEPHONE (OUTPATIENT)
Dept: OPTOMETRY | Age: 54
End: 2024-04-18

## 2024-04-18 ENCOUNTER — APPOINTMENT (OUTPATIENT)
Dept: OPTOMETRY | Age: 54
End: 2024-04-18

## 2024-04-18 ENCOUNTER — E-ADVICE (OUTPATIENT)
Dept: OPTOMETRY | Age: 54
End: 2024-04-18

## 2024-04-18 DIAGNOSIS — H52.223 REGULAR ASTIGMATISM, BILATERAL: ICD-10-CM

## 2024-04-18 DIAGNOSIS — H52.13 MYOPIA, BILATERAL: Primary | ICD-10-CM

## 2024-04-18 PROCEDURE — X1094 NO CHARGE VISIT: HCPCS

## 2024-04-18 ASSESSMENT — REFRACTION_WEARINGRX
OD_ADD: +2.25
OS_SPHERE: -1.50
OS_ADD: +2.25
OS_AXIS: 103
OD_CYLINDER: +1.50
OS_CYLINDER: +1.25
OD_AXIS: 109
OD_SPHERE: -1.50

## 2024-04-18 ASSESSMENT — CONF VISUAL FIELD
OS_INFERIOR_NASAL_RESTRICTION: 0
OD_SUPERIOR_TEMPORAL_RESTRICTION: 0
OS_SUPERIOR_TEMPORAL_RESTRICTION: 0
OS_SUPERIOR_NASAL_RESTRICTION: 0
OD_INFERIOR_TEMPORAL_RESTRICTION: 0
OS_NORMAL: 1
OS_INFERIOR_TEMPORAL_RESTRICTION: 0
OD_SUPERIOR_NASAL_RESTRICTION: 0
OD_INFERIOR_NASAL_RESTRICTION: 0
OD_NORMAL: 1

## 2024-04-18 ASSESSMENT — REFRACTION_MANIFEST
OS_SPHERE: -1.50
OD_SPHERE: -2.00
OD_CYLINDER: +1.50
OS_AXIS: 105
OS_CYLINDER: +1.25
OD_AXIS: 095

## 2024-04-18 ASSESSMENT — VISUAL ACUITY
METHOD: SNELLEN - LINEAR
OD_CC: 20/20
OD_CC: J3 @ 20
CORRECTION_TYPE: GLASSES
OS_CC: 20/20
OD_CC+: -2

## 2024-04-19 ENCOUNTER — LAB SERVICES (OUTPATIENT)
Dept: LAB | Age: 54
End: 2024-04-19

## 2024-04-19 ENCOUNTER — E-ADVICE (OUTPATIENT)
Dept: NEPHROLOGY | Age: 54
End: 2024-04-19

## 2024-04-19 DIAGNOSIS — Q87.81 ALPORT'S SYNDROME (CMD): ICD-10-CM

## 2024-04-19 DIAGNOSIS — K13.79 MOUTH SORES: ICD-10-CM

## 2024-04-19 DIAGNOSIS — Z01.419 WELL WOMAN EXAM: ICD-10-CM

## 2024-04-19 LAB
25(OH)D3+25(OH)D2 SERPL-MCNC: 34.4 NG/ML (ref 30–100)
ALBUMIN SERPL-MCNC: 3.7 G/DL (ref 3.6–5.1)
ALBUMIN/GLOB SERPL: 1.1 {RATIO} (ref 1–2.4)
ALP SERPL-CCNC: 71 UNITS/L (ref 45–117)
ALT SERPL-CCNC: 32 UNITS/L
ANION GAP SERPL CALC-SCNC: 14 MMOL/L (ref 7–19)
AST SERPL-CCNC: 18 UNITS/L
BASOPHILS # BLD: 0 K/MCL (ref 0–0.3)
BASOPHILS NFR BLD: 1 %
BILIRUB SERPL-MCNC: 0.6 MG/DL (ref 0.2–1)
BUN SERPL-MCNC: 13 MG/DL (ref 6–20)
BUN/CREAT SERPL: 16 (ref 7–25)
CALCIUM SERPL-MCNC: 8.6 MG/DL (ref 8.4–10.2)
CHLORIDE SERPL-SCNC: 104 MMOL/L (ref 97–110)
CHOLEST SERPL-MCNC: 196 MG/DL
CHOLEST/HDLC SERPL: 2.2 {RATIO}
CO2 SERPL-SCNC: 30 MMOL/L (ref 21–32)
CREAT SERPL-MCNC: 0.81 MG/DL (ref 0.51–0.95)
DEPRECATED RDW RBC: 47.7 FL (ref 39–50)
EGFRCR SERPLBLD CKD-EPI 2021: 86 ML/MIN/{1.73_M2}
EOSINOPHIL # BLD: 0.1 K/MCL (ref 0–0.5)
EOSINOPHIL NFR BLD: 2 %
ERYTHROCYTE [DISTWIDTH] IN BLOOD: 13.4 % (ref 11–15)
FASTING DURATION TIME PATIENT: 13 HOURS (ref 0–999)
FOLATE SERPL-MCNC: >24 NG/ML
GLOBULIN SER-MCNC: 3.3 G/DL (ref 2–4)
GLUCOSE SERPL-MCNC: 89 MG/DL (ref 70–99)
HBA1C MFR BLD: 5.6 % (ref 4.5–5.6)
HCT VFR BLD CALC: 41.2 % (ref 36–46.5)
HDLC SERPL-MCNC: 88 MG/DL
HGB BLD-MCNC: 13.4 G/DL (ref 12–15.5)
IMM GRANULOCYTES # BLD AUTO: 0 K/MCL (ref 0–0.2)
IMM GRANULOCYTES # BLD: 0 %
LDLC SERPL CALC-MCNC: 90 MG/DL
LYMPHOCYTES # BLD: 2 K/MCL (ref 1–4)
LYMPHOCYTES NFR BLD: 47 %
MCH RBC QN AUTO: 31.4 PG (ref 26–34)
MCHC RBC AUTO-ENTMCNC: 32.5 G/DL (ref 32–36.5)
MCV RBC AUTO: 96.5 FL (ref 78–100)
MONOCYTES # BLD: 0.4 K/MCL (ref 0.3–0.9)
MONOCYTES NFR BLD: 9 %
NEUTROPHILS # BLD: 1.8 K/MCL (ref 1.8–7.7)
NEUTROPHILS NFR BLD: 41 %
NONHDLC SERPL-MCNC: 108 MG/DL
NRBC BLD MANUAL-RTO: 0 /100 WBC
PLATELET # BLD AUTO: 209 K/MCL (ref 140–450)
POTASSIUM SERPL-SCNC: 4.1 MMOL/L (ref 3.4–5.1)
PROT SERPL-MCNC: 7 G/DL (ref 6.4–8.2)
RBC # BLD: 4.27 MIL/MCL (ref 4–5.2)
SODIUM SERPL-SCNC: 144 MMOL/L (ref 135–145)
TRIGL SERPL-MCNC: 90 MG/DL
TSH SERPL-ACNC: 2.13 MCUNITS/ML (ref 0.35–5)
VIT B12 SERPL-MCNC: 1800 PG/ML (ref 211–911)
WBC # BLD: 4.4 K/MCL (ref 4.2–11)

## 2024-04-19 PROCEDURE — 82306 VITAMIN D 25 HYDROXY: CPT | Performed by: INTERNAL MEDICINE

## 2024-04-19 PROCEDURE — 36415 COLL VENOUS BLD VENIPUNCTURE: CPT | Performed by: FAMILY MEDICINE

## 2024-04-19 PROCEDURE — 82607 VITAMIN B-12: CPT | Performed by: CLINICAL MEDICAL LABORATORY

## 2024-04-19 PROCEDURE — 80061 LIPID PANEL: CPT | Performed by: INTERNAL MEDICINE

## 2024-04-19 PROCEDURE — 80050 GENERAL HEALTH PANEL: CPT | Performed by: INTERNAL MEDICINE

## 2024-04-19 PROCEDURE — 83036 HEMOGLOBIN GLYCOSYLATED A1C: CPT | Performed by: INTERNAL MEDICINE

## 2024-04-19 PROCEDURE — 82746 ASSAY OF FOLIC ACID SERUM: CPT | Performed by: CLINICAL MEDICAL LABORATORY

## 2024-04-23 ENCOUNTER — TELEPHONE (OUTPATIENT)
Dept: INTERNAL MEDICINE | Age: 54
End: 2024-04-23

## 2024-04-24 ENCOUNTER — APPOINTMENT (OUTPATIENT)
Dept: AUDIOLOGY | Age: 54
End: 2024-04-24

## 2024-04-29 ENCOUNTER — APPOINTMENT (OUTPATIENT)
Dept: ALLERGY | Age: 54
End: 2024-04-29

## 2024-06-12 DIAGNOSIS — E55.9 VITAMIN D DEFICIENCY: ICD-10-CM

## 2024-06-12 RX ORDER — ATENOLOL 25 MG/1
TABLET ORAL
Qty: 45 TABLET | Refills: 1 | Status: SHIPPED | OUTPATIENT
Start: 2024-06-12 | End: 2024-06-13 | Stop reason: SDUPTHER

## 2024-06-13 ENCOUNTER — E-ADVICE (OUTPATIENT)
Dept: FAMILY MEDICINE | Age: 54
End: 2024-06-13

## 2024-06-13 DIAGNOSIS — M79.10 MYALGIA: ICD-10-CM

## 2024-06-13 DIAGNOSIS — I10 ESSENTIAL HYPERTENSION: ICD-10-CM

## 2024-06-13 DIAGNOSIS — J31.0 CHRONIC RHINITIS: Chronic | ICD-10-CM

## 2024-06-13 DIAGNOSIS — E55.9 VITAMIN D DEFICIENCY: ICD-10-CM

## 2024-06-13 DIAGNOSIS — D05.10 DUCTAL CARCINOMA IN SITU (DCIS) OF BREAST, UNSPECIFIED LATERALITY: ICD-10-CM

## 2024-06-13 RX ORDER — ATENOLOL 25 MG/1
12.5 TABLET ORAL DAILY
Qty: 15 TABLET | Refills: 0 | Status: SHIPPED | OUTPATIENT
Start: 2024-06-13 | End: 2024-06-14 | Stop reason: SDUPTHER

## 2024-06-13 RX ORDER — ATENOLOL 25 MG/1
12.5 TABLET ORAL DAILY
Qty: 45 TABLET | Refills: 1 | Status: CANCELLED | OUTPATIENT
Start: 2024-06-13

## 2024-06-14 DIAGNOSIS — N18.30 STAGE 3 CHRONIC KIDNEY DISEASE, UNSPECIFIED WHETHER STAGE 3A OR 3B CKD  (CMD): Primary | ICD-10-CM

## 2024-06-14 RX ORDER — ATENOLOL 25 MG/1
25 TABLET ORAL DAILY
Qty: 14 TABLET | Refills: 0 | Status: SHIPPED | OUTPATIENT
Start: 2024-06-14 | End: 2024-06-15 | Stop reason: SDUPTHER

## 2024-06-14 RX ORDER — ATENOLOL 25 MG/1
12.5 TABLET ORAL DAILY
Qty: 45 TABLET | Refills: 0 | Status: SHIPPED | OUTPATIENT
Start: 2024-06-14 | End: 2024-06-14 | Stop reason: SDUPTHER

## 2024-06-14 RX ORDER — ATENOLOL 25 MG/1
25 TABLET ORAL DAILY
Qty: 90 TABLET | Refills: 0 | Status: CANCELLED | OUTPATIENT
Start: 2024-06-14

## 2024-06-14 RX ORDER — CYCLOBENZAPRINE HCL 5 MG
5 TABLET ORAL
Qty: 30 TABLET | Refills: 1 | Status: SHIPPED | OUTPATIENT
Start: 2024-06-14

## 2024-06-14 RX ORDER — ATENOLOL 25 MG/1
12.5 TABLET ORAL DAILY
Qty: 90 TABLET | Refills: 0 | Status: SHIPPED | OUTPATIENT
Start: 2024-06-14 | End: 2024-06-15 | Stop reason: DRUGHIGH

## 2024-06-15 DIAGNOSIS — N18.30 STAGE 3 CHRONIC KIDNEY DISEASE, UNSPECIFIED WHETHER STAGE 3A OR 3B CKD  (CMD): ICD-10-CM

## 2024-06-15 RX ORDER — ATENOLOL 25 MG/1
25 TABLET ORAL DAILY
Qty: 90 TABLET | Refills: 0 | Status: SHIPPED | OUTPATIENT
Start: 2024-06-15 | End: 2024-06-15 | Stop reason: SDUPTHER

## 2024-06-15 RX ORDER — ATENOLOL 25 MG/1
25 TABLET ORAL DAILY
Qty: 90 TABLET | Refills: 0 | Status: SHIPPED | OUTPATIENT
Start: 2024-06-15

## 2024-06-18 ENCOUNTER — E-ADVICE (OUTPATIENT)
Dept: OBGYN | Age: 54
End: 2024-06-18

## 2024-07-08 ENCOUNTER — OFFICE VISIT (OUTPATIENT)
Dept: FAMILY MEDICINE | Age: 54
End: 2024-07-08

## 2024-07-08 ENCOUNTER — NURSE TRIAGE (OUTPATIENT)
Dept: FAMILY MEDICINE | Age: 54
End: 2024-07-08

## 2024-07-08 ENCOUNTER — TELEPHONE (OUTPATIENT)
Dept: FAMILY MEDICINE | Age: 54
End: 2024-07-08

## 2024-07-08 VITALS
TEMPERATURE: 97.6 F | WEIGHT: 181 LBS | BODY MASS INDEX: 26.81 KG/M2 | DIASTOLIC BLOOD PRESSURE: 75 MMHG | RESPIRATION RATE: 16 BRPM | HEART RATE: 80 BPM | SYSTOLIC BLOOD PRESSURE: 111 MMHG | OXYGEN SATURATION: 97 % | HEIGHT: 69 IN

## 2024-07-08 DIAGNOSIS — Q87.81 ALPORT'S SYNDROME (CMD): ICD-10-CM

## 2024-07-08 DIAGNOSIS — R51.9 LEFT TEMPORAL HEADACHE: Primary | ICD-10-CM

## 2024-07-08 LAB
ALBUMIN SERPL-MCNC: 4.1 G/DL (ref 3.6–5.1)
ALBUMIN/GLOB SERPL: 1.2 {RATIO} (ref 1–2.4)
ALP SERPL-CCNC: 80 UNITS/L (ref 45–117)
ALT SERPL-CCNC: 40 UNITS/L
ANION GAP SERPL CALC-SCNC: 12 MMOL/L (ref 7–19)
AST SERPL-CCNC: 27 UNITS/L
BASOPHILS # BLD: 0.1 K/MCL (ref 0–0.3)
BASOPHILS NFR BLD: 1 %
BILIRUB SERPL-MCNC: 0.4 MG/DL (ref 0.2–1)
BUN SERPL-MCNC: 18 MG/DL (ref 6–20)
BUN/CREAT SERPL: 24 (ref 7–25)
CALCIUM SERPL-MCNC: 9.4 MG/DL (ref 8.4–10.2)
CHLORIDE SERPL-SCNC: 102 MMOL/L (ref 97–110)
CO2 SERPL-SCNC: 31 MMOL/L (ref 21–32)
CREAT SERPL-MCNC: 0.76 MG/DL (ref 0.51–0.95)
DEPRECATED RDW RBC: 46.5 FL (ref 39–50)
EGFRCR SERPLBLD CKD-EPI 2021: >90 ML/MIN/{1.73_M2}
EOSINOPHIL # BLD: 0.1 K/MCL (ref 0–0.5)
EOSINOPHIL NFR BLD: 1 %
ERYTHROCYTE [DISTWIDTH] IN BLOOD: 13.2 % (ref 11–15)
ERYTHROCYTE [SEDIMENTATION RATE] IN BLOOD BY WESTERGREN METHOD: 10 MM/HR (ref 0–20)
FASTING DURATION TIME PATIENT: NORMAL H
GLOBULIN SER-MCNC: 3.3 G/DL (ref 2–4)
GLUCOSE SERPL-MCNC: 93 MG/DL (ref 70–99)
HCT VFR BLD CALC: 39.1 % (ref 36–46.5)
HGB BLD-MCNC: 12.9 G/DL (ref 12–15.5)
IMM GRANULOCYTES # BLD AUTO: 0 K/MCL (ref 0–0.2)
IMM GRANULOCYTES # BLD: 0 %
LYMPHOCYTES # BLD: 1.7 K/MCL (ref 1–4)
LYMPHOCYTES NFR BLD: 32 %
MCH RBC QN AUTO: 31.5 PG (ref 26–34)
MCHC RBC AUTO-ENTMCNC: 33 G/DL (ref 32–36.5)
MCV RBC AUTO: 95.6 FL (ref 78–100)
MONOCYTES # BLD: 0.4 K/MCL (ref 0.3–0.9)
MONOCYTES NFR BLD: 7 %
NEUTROPHILS # BLD: 3.1 K/MCL (ref 1.8–7.7)
NEUTROPHILS NFR BLD: 59 %
NRBC BLD MANUAL-RTO: 0 /100 WBC
PLATELET # BLD AUTO: 220 K/MCL (ref 140–450)
POTASSIUM SERPL-SCNC: 4.3 MMOL/L (ref 3.4–5.1)
PROT SERPL-MCNC: 7.4 G/DL (ref 6.4–8.2)
RBC # BLD: 4.09 MIL/MCL (ref 4–5.2)
SODIUM SERPL-SCNC: 141 MMOL/L (ref 135–145)
WBC # BLD: 5.3 K/MCL (ref 4.2–11)

## 2024-07-08 PROCEDURE — 85652 RBC SED RATE AUTOMATED: CPT | Performed by: INTERNAL MEDICINE

## 2024-07-08 PROCEDURE — 85025 COMPLETE CBC W/AUTO DIFF WBC: CPT | Performed by: INTERNAL MEDICINE

## 2024-07-08 PROCEDURE — 80053 COMPREHEN METABOLIC PANEL: CPT | Performed by: INTERNAL MEDICINE

## 2024-07-08 PROCEDURE — 36415 COLL VENOUS BLD VENIPUNCTURE: CPT | Performed by: FAMILY MEDICINE

## 2024-07-08 RX ORDER — AMOXICILLIN AND CLAVULANATE POTASSIUM 875; 125 MG/1; MG/1
1 TABLET, FILM COATED ORAL 2 TIMES DAILY
Qty: 14 TABLET | Refills: 0 | Status: SHIPPED | OUTPATIENT
Start: 2024-07-08 | End: 2024-07-15

## 2024-07-08 RX ORDER — AMOXICILLIN AND CLAVULANATE POTASSIUM 875; 125 MG/1; MG/1
1 TABLET, FILM COATED ORAL 2 TIMES DAILY
Qty: 14 TABLET | Refills: 0 | Status: SHIPPED | OUTPATIENT
Start: 2024-07-08 | End: 2024-07-08 | Stop reason: CLARIF

## 2024-07-08 ASSESSMENT — PATIENT HEALTH QUESTIONNAIRE - PHQ9
SUM OF ALL RESPONSES TO PHQ9 QUESTIONS 1 AND 2: 0
CLINICAL INTERPRETATION OF PHQ2 SCORE: NO FURTHER SCREENING NEEDED
2. FEELING DOWN, DEPRESSED OR HOPELESS: NOT AT ALL
SUM OF ALL RESPONSES TO PHQ9 QUESTIONS 1 AND 2: 0
1. LITTLE INTEREST OR PLEASURE IN DOING THINGS: NOT AT ALL

## 2024-07-08 ASSESSMENT — ENCOUNTER SYMPTOMS: HEADACHES: 1

## 2024-07-11 ENCOUNTER — E-ADVICE (OUTPATIENT)
Dept: FAMILY MEDICINE | Age: 54
End: 2024-07-11

## 2024-07-23 ENCOUNTER — E-ADVICE (OUTPATIENT)
Dept: NEPHROLOGY | Age: 54
End: 2024-07-23

## 2024-08-12 ENCOUNTER — APPOINTMENT (OUTPATIENT)
Dept: CT IMAGING | Age: 54
End: 2024-08-12
Attending: FAMILY MEDICINE

## 2024-08-22 ENCOUNTER — TELEPHONE (OUTPATIENT)
Dept: NEPHROLOGY | Age: 54
End: 2024-08-22

## 2024-08-23 ENCOUNTER — LAB SERVICES (OUTPATIENT)
Dept: LAB | Age: 54
End: 2024-08-23

## 2024-08-23 ENCOUNTER — APPOINTMENT (OUTPATIENT)
Dept: MAMMOGRAPHY | Age: 54
End: 2024-08-23
Attending: PHYSICIAN ASSISTANT

## 2024-08-23 DIAGNOSIS — Z12.31 VISIT FOR SCREENING MAMMOGRAM: ICD-10-CM

## 2024-08-23 DIAGNOSIS — Q87.81 ALPORT'S SYNDROME (CMD): ICD-10-CM

## 2024-08-23 LAB
APPEARANCE UR: CLEAR
BACTERIA #/AREA URNS HPF: ABNORMAL /HPF
BILIRUB UR QL STRIP: NEGATIVE
COLOR UR: YELLOW
CREAT UR-MCNC: 83.94 MG/DL
GLUCOSE UR STRIP-MCNC: NEGATIVE MG/DL
HGB UR QL STRIP: ABNORMAL
HYALINE CASTS #/AREA URNS LPF: ABNORMAL /LPF
KETONES UR STRIP-MCNC: NEGATIVE MG/DL
LEUKOCYTE ESTERASE UR QL STRIP: ABNORMAL
MICROALBUMIN UR-MCNC: <0.5 MG/DL
MICROALBUMIN/CREAT UR: NORMAL MG/G{CREAT}
NITRITE UR QL STRIP: NEGATIVE
PH UR STRIP: 6.5 [PH] (ref 5–7)
PROT UR STRIP-MCNC: NEGATIVE MG/DL
RBC #/AREA URNS HPF: ABNORMAL /HPF
SP GR UR STRIP: 1.02 (ref 1–1.03)
SQUAMOUS #/AREA URNS HPF: ABNORMAL /HPF
UROBILINOGEN UR STRIP-MCNC: 0.2 MG/DL
WBC #/AREA URNS HPF: ABNORMAL /HPF

## 2024-08-23 PROCEDURE — 82570 ASSAY OF URINE CREATININE: CPT | Performed by: INTERNAL MEDICINE

## 2024-08-23 PROCEDURE — 81001 URINALYSIS AUTO W/SCOPE: CPT | Performed by: INTERNAL MEDICINE

## 2024-08-23 PROCEDURE — 77063 BREAST TOMOSYNTHESIS BI: CPT | Performed by: STUDENT IN AN ORGANIZED HEALTH CARE EDUCATION/TRAINING PROGRAM

## 2024-08-23 PROCEDURE — 77067 SCR MAMMO BI INCL CAD: CPT | Performed by: STUDENT IN AN ORGANIZED HEALTH CARE EDUCATION/TRAINING PROGRAM

## 2024-08-23 PROCEDURE — 82043 UR ALBUMIN QUANTITATIVE: CPT | Performed by: INTERNAL MEDICINE

## 2024-08-23 PROCEDURE — 87086 URINE CULTURE/COLONY COUNT: CPT | Performed by: CLINICAL MEDICAL LABORATORY

## 2024-08-24 LAB — BACTERIA UR CULT: NORMAL

## 2024-08-30 DIAGNOSIS — E55.9 VITAMIN D DEFICIENCY: ICD-10-CM

## 2024-08-30 RX ORDER — ATENOLOL 25 MG/1
12.5 TABLET ORAL DAILY
Qty: 45 TABLET | Refills: 0 | Status: SHIPPED | OUTPATIENT
Start: 2024-08-30

## 2024-09-05 ENCOUNTER — E-ADVICE (OUTPATIENT)
Dept: NEUROSURGERY | Age: 54
End: 2024-09-05

## 2024-09-06 ENCOUNTER — WALK IN (OUTPATIENT)
Dept: URGENT CARE | Age: 54
End: 2024-09-06

## 2024-09-06 VITALS
DIASTOLIC BLOOD PRESSURE: 80 MMHG | HEART RATE: 72 BPM | SYSTOLIC BLOOD PRESSURE: 128 MMHG | RESPIRATION RATE: 18 BRPM | OXYGEN SATURATION: 100 % | TEMPERATURE: 97.5 F

## 2024-09-06 DIAGNOSIS — M54.2 MUSCULOSKELETAL NECK PAIN: ICD-10-CM

## 2024-09-06 DIAGNOSIS — H92.03 REFERRED EAR PAIN, BILATERAL: ICD-10-CM

## 2024-09-06 DIAGNOSIS — M26.623 TMJ TENDERNESS, BILATERAL: ICD-10-CM

## 2024-09-06 DIAGNOSIS — M54.2 NECK PAIN ON LEFT SIDE: Primary | ICD-10-CM

## 2024-09-06 PROCEDURE — 99214 OFFICE O/P EST MOD 30 MIN: CPT | Performed by: FAMILY MEDICINE

## 2024-09-06 RX ORDER — METHYLPREDNISOLONE 4 MG
TABLET, DOSE PACK ORAL
Qty: 21 TABLET | Refills: 0 | Status: SHIPPED | OUTPATIENT
Start: 2024-09-06

## 2024-09-06 RX ORDER — CYCLOBENZAPRINE HCL 5 MG
5 TABLET ORAL
Qty: 30 TABLET | Refills: 0 | Status: SHIPPED | OUTPATIENT
Start: 2024-09-06

## 2024-09-06 ASSESSMENT — PAIN SCALES - GENERAL: PAINLEVEL: 4

## 2024-09-23 ENCOUNTER — E-ADVICE (OUTPATIENT)
Dept: FAMILY MEDICINE | Age: 54
End: 2024-09-23

## 2024-09-23 DIAGNOSIS — R51.9 LEFT TEMPORAL HEADACHE: Primary | ICD-10-CM

## 2024-09-24 ENCOUNTER — TELEPHONE (OUTPATIENT)
Dept: NEUROLOGY | Age: 54
End: 2024-09-24

## 2024-10-08 ENCOUNTER — E-ADVICE (OUTPATIENT)
Dept: FAMILY MEDICINE | Age: 54
End: 2024-10-08

## 2024-10-09 ENCOUNTER — E-ADVICE (OUTPATIENT)
Dept: FAMILY MEDICINE | Age: 54
End: 2024-10-09

## 2024-10-09 SDOH — ECONOMIC STABILITY: FOOD INSECURITY: WITHIN THE PAST 12 MONTHS, THE FOOD YOU BOUGHT JUST DIDN'T LAST AND YOU DIDN'T HAVE MONEY TO GET MORE.: NEVER TRUE

## 2024-10-09 SDOH — ECONOMIC STABILITY: GENERAL: WOULD YOU LIKE HELP WITH ANY OF THE FOLLOWING NEEDS?: I DON'T WANT HELP WITH ANY OF THESE

## 2024-10-09 SDOH — ECONOMIC STABILITY: HOUSING INSECURITY: WHAT IS YOUR LIVING SITUATION TODAY?: I HAVE A STEADY PLACE TO LIVE

## 2024-10-09 SDOH — ECONOMIC STABILITY: TRANSPORTATION INSECURITY
IN THE PAST 12 MONTHS, HAS LACK OF RELIABLE TRANSPORTATION KEPT YOU FROM MEDICAL APPOINTMENTS, MEETINGS, WORK OR FROM GETTING THINGS NEEDED FOR DAILY LIVING?: NO

## 2024-10-09 SDOH — ECONOMIC STABILITY: HOUSING INSECURITY: DO YOU HAVE PROBLEMS WITH ANY OF THE FOLLOWING?: NONE OF THE ABOVE

## 2024-10-09 ASSESSMENT — SOCIAL DETERMINANTS OF HEALTH (SDOH): IN THE PAST 12 MONTHS, HAS THE ELECTRIC, GAS, OIL, OR WATER COMPANY THREATENED TO SHUT OFF SERVICE IN YOUR HOME?: NO

## 2024-10-16 ENCOUNTER — APPOINTMENT (OUTPATIENT)
Dept: FAMILY MEDICINE | Age: 54
End: 2024-10-16

## 2024-10-16 VITALS
RESPIRATION RATE: 18 BRPM | WEIGHT: 183.2 LBS | BODY MASS INDEX: 27.13 KG/M2 | OXYGEN SATURATION: 99 % | HEIGHT: 69 IN | TEMPERATURE: 97.9 F | DIASTOLIC BLOOD PRESSURE: 82 MMHG | HEART RATE: 88 BPM | SYSTOLIC BLOOD PRESSURE: 121 MMHG

## 2024-10-16 DIAGNOSIS — R51.9 DAILY HEADACHE: ICD-10-CM

## 2024-10-16 DIAGNOSIS — Z01.419 WELL WOMAN EXAM: Primary | ICD-10-CM

## 2024-10-16 PROCEDURE — 99396 PREV VISIT EST AGE 40-64: CPT | Performed by: FAMILY MEDICINE

## 2024-10-16 RX ORDER — TOPIRAMATE 25 MG/1
25 TABLET, FILM COATED ORAL 2 TIMES DAILY
Qty: 60 TABLET | Refills: 2 | Status: SHIPPED | OUTPATIENT
Start: 2024-10-16

## 2024-10-16 ASSESSMENT — PATIENT HEALTH QUESTIONNAIRE - PHQ9
SUM OF ALL RESPONSES TO PHQ9 QUESTIONS 1 AND 2: 0
SUM OF ALL RESPONSES TO PHQ9 QUESTIONS 1 AND 2: 0
CLINICAL INTERPRETATION OF PHQ2 SCORE: NO FURTHER SCREENING NEEDED
1. LITTLE INTEREST OR PLEASURE IN DOING THINGS: NOT AT ALL
2. FEELING DOWN, DEPRESSED OR HOPELESS: NOT AT ALL

## 2024-10-17 ENCOUNTER — E-ADVICE (OUTPATIENT)
Dept: NEPHROLOGY | Age: 54
End: 2024-10-17

## 2024-10-17 ENCOUNTER — E-ADVICE (OUTPATIENT)
Dept: FAMILY MEDICINE | Age: 54
End: 2024-10-17

## 2024-10-18 ENCOUNTER — TELEPHONE (OUTPATIENT)
Dept: NEUROLOGY | Age: 54
End: 2024-10-18

## 2024-10-28 ENCOUNTER — APPOINTMENT (OUTPATIENT)
Dept: FAMILY MEDICINE | Age: 54
End: 2024-10-28

## 2024-10-29 ENCOUNTER — TELEPHONE (OUTPATIENT)
Dept: NEUROLOGY | Facility: CLINIC | Age: 54
End: 2024-10-29

## 2024-10-29 ENCOUNTER — OFFICE VISIT (OUTPATIENT)
Dept: NEUROLOGY | Facility: CLINIC | Age: 54
End: 2024-10-29
Payer: COMMERCIAL

## 2024-10-29 VITALS
HEART RATE: 81 BPM | SYSTOLIC BLOOD PRESSURE: 108 MMHG | BODY MASS INDEX: 26.22 KG/M2 | RESPIRATION RATE: 16 BRPM | HEIGHT: 69 IN | DIASTOLIC BLOOD PRESSURE: 68 MMHG | WEIGHT: 177 LBS

## 2024-10-29 DIAGNOSIS — G43.809 MIGRAINE VARIANT WITH HEADACHE: ICD-10-CM

## 2024-10-29 DIAGNOSIS — G44.091 OTHER INTRACTABLE TRIGEMINAL AUTONOMIC CEPHALGIA (TAC): Primary | ICD-10-CM

## 2024-10-29 DIAGNOSIS — I67.9 SMALL VESSEL DISEASE, CEREBROVASCULAR: ICD-10-CM

## 2024-10-29 DIAGNOSIS — M79.18 CERVICAL MYOFASCIAL PAIN SYNDROME: ICD-10-CM

## 2024-10-29 DIAGNOSIS — I63.19 CEREBROVASCULAR ACCIDENT (CVA) DUE TO EMBOLISM OF OTHER PRECEREBRAL ARTERY (HCC): ICD-10-CM

## 2024-10-29 PROCEDURE — 99204 OFFICE O/P NEW MOD 45 MIN: CPT | Performed by: OTHER

## 2024-10-29 RX ORDER — PREDNISONE 20 MG/1
TABLET ORAL
Qty: 30 TABLET | Refills: 0 | Status: SHIPPED | OUTPATIENT
Start: 2024-10-29 | End: 2024-10-29

## 2024-10-29 RX ORDER — CYCLOBENZAPRINE HCL 5 MG
5 TABLET ORAL NIGHTLY PRN
COMMUNITY
Start: 2024-09-06

## 2024-10-29 RX ORDER — PREDNISONE 20 MG/1
TABLET ORAL
Qty: 30 TABLET | Refills: 0 | Status: SHIPPED | OUTPATIENT
Start: 2024-10-29

## 2024-10-29 RX ORDER — CETIRIZINE HYDROCHLORIDE 10 MG/1
10 TABLET ORAL DAILY
COMMUNITY

## 2024-10-29 RX ORDER — VALACYCLOVIR HYDROCHLORIDE 500 MG/1
1 TABLET, FILM COATED ORAL DAILY
COMMUNITY
Start: 2024-04-06

## 2024-10-29 RX ORDER — MAGNESIUM OXIDE 400 MG/1
400 TABLET ORAL DAILY
COMMUNITY

## 2024-10-29 RX ORDER — CHOLECALCIFEROL (VITAMIN D3) 50 MCG
TABLET ORAL
COMMUNITY

## 2024-10-29 RX ORDER — TOPIRAMATE 25 MG/1
25 TABLET, FILM COATED ORAL NIGHTLY
COMMUNITY
Start: 2024-10-16

## 2024-10-29 RX ORDER — CHLORAL HYDRATE 500 MG
CAPSULE ORAL
COMMUNITY

## 2024-10-29 RX ORDER — MULTIVITAMIN
1 TABLET ORAL DAILY
COMMUNITY

## 2024-10-29 NOTE — TELEPHONE ENCOUNTER
Patient was in the office today and her prescription was sent to her specialty pharmacy it need to go to her local pharmacy [id number] - [address] AT [facility] [address], [phone], [phone]
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
## 2024-10-29 NOTE — PATIENT INSTRUCTIONS

## 2024-10-29 NOTE — TELEPHONE ENCOUNTER
MRI Brain dated 8/11/24 uploaded to "[employer] job #[id number]. Disc returned to patient.

## 2024-11-05 ENCOUNTER — HOSPITAL ENCOUNTER (OUTPATIENT)
Dept: CV DIAGNOSTICS | Facility: HOSPITAL | Age: 54
Discharge: HOME | End: 2024-11-05
Attending: Other
Payer: COMMERCIAL

## 2024-11-05 DIAGNOSIS — G43.809 MIGRAINE VARIANT WITH HEADACHE: ICD-10-CM

## 2024-11-05 DIAGNOSIS — I67.9 SMALL VESSEL DISEASE, CEREBROVASCULAR: ICD-10-CM

## 2024-11-05 DIAGNOSIS — G44.091 OTHER INTRACTABLE TRIGEMINAL AUTONOMIC CEPHALGIA (TAC): ICD-10-CM

## 2024-11-05 DIAGNOSIS — I63.19 CEREBROVASCULAR ACCIDENT (CVA) DUE TO EMBOLISM OF OTHER PRECEREBRAL ARTERY (HCC): ICD-10-CM

## 2024-11-05 PROCEDURE — 93306 TTE W/DOPPLER COMPLETE: CPT | Performed by: OTHER

## 2024-11-06 ENCOUNTER — APPOINTMENT (OUTPATIENT)
Dept: DERMATOLOGY | Age: 54
End: 2024-11-06

## 2024-11-06 ENCOUNTER — TELEPHONE (OUTPATIENT)
Dept: DERMATOLOGY | Age: 54
End: 2024-11-06

## 2024-11-18 ENCOUNTER — APPOINTMENT (OUTPATIENT)
Dept: OBGYN | Age: 54
End: 2024-11-18

## 2024-11-18 DIAGNOSIS — R51.9 DAILY HEADACHE: ICD-10-CM

## 2024-11-19 RX ORDER — TOPIRAMATE 25 MG/1
25 TABLET, FILM COATED ORAL 2 TIMES DAILY
Qty: 180 TABLET | Refills: 1 | Status: SHIPPED | OUTPATIENT
Start: 2024-11-19

## 2024-12-02 ENCOUNTER — TELEPHONE (OUTPATIENT)
Dept: OTOLARYNGOLOGY | Age: 54
End: 2024-12-02

## 2024-12-03 ENCOUNTER — TELEPHONE (OUTPATIENT)
Dept: NEUROLOGY | Facility: CLINIC | Age: 54
End: 2024-12-03

## 2024-12-03 ENCOUNTER — OFFICE VISIT (OUTPATIENT)
Dept: NEUROLOGY | Facility: CLINIC | Age: 54
End: 2024-12-03
Payer: COMMERCIAL

## 2024-12-03 ENCOUNTER — LAB ENCOUNTER (OUTPATIENT)
Dept: LAB | Age: 54
End: 2024-12-03
Attending: Other
Payer: COMMERCIAL

## 2024-12-03 ENCOUNTER — TELEPHONE (OUTPATIENT)
Dept: PHYSICAL THERAPY | Age: 54
End: 2024-12-03

## 2024-12-03 VITALS
WEIGHT: 177 LBS | RESPIRATION RATE: 16 BRPM | SYSTOLIC BLOOD PRESSURE: 118 MMHG | HEART RATE: 80 BPM | HEIGHT: 69 IN | BODY MASS INDEX: 26.22 KG/M2 | DIASTOLIC BLOOD PRESSURE: 72 MMHG

## 2024-12-03 DIAGNOSIS — I63.19 CEREBROVASCULAR ACCIDENT (CVA) DUE TO EMBOLISM OF OTHER PRECEREBRAL ARTERY (HCC): Primary | ICD-10-CM

## 2024-12-03 DIAGNOSIS — M79.18 CERVICAL MYOFASCIAL PAIN SYNDROME: ICD-10-CM

## 2024-12-03 DIAGNOSIS — I63.19 CEREBROVASCULAR ACCIDENT (CVA) DUE TO EMBOLISM OF OTHER PRECEREBRAL ARTERY (HCC): ICD-10-CM

## 2024-12-03 DIAGNOSIS — I67.9 SMALL VESSEL DISEASE, CEREBROVASCULAR: ICD-10-CM

## 2024-12-03 LAB
CRP SERPL-MCNC: <0.4 MG/DL (ref ?–0.5)
ERYTHROCYTE [SEDIMENTATION RATE] IN BLOOD: 43 MM/HR
RHEUMATOID FACT SERPL-ACNC: <3.5 IU/ML (ref ?–14)

## 2024-12-03 PROCEDURE — 86146 BETA-2 GLYCOPROTEIN ANTIBODY: CPT

## 2024-12-03 PROCEDURE — 86431 RHEUMATOID FACTOR QUANT: CPT | Performed by: OTHER

## 2024-12-03 PROCEDURE — 85705 THROMBOPLASTIN INHIBITION: CPT

## 2024-12-03 PROCEDURE — 86038 ANTINUCLEAR ANTIBODIES: CPT

## 2024-12-03 PROCEDURE — 85613 RUSSELL VIPER VENOM DILUTED: CPT

## 2024-12-03 PROCEDURE — 99214 OFFICE O/P EST MOD 30 MIN: CPT | Performed by: OTHER

## 2024-12-03 PROCEDURE — 36415 COLL VENOUS BLD VENIPUNCTURE: CPT | Performed by: OTHER

## 2024-12-03 PROCEDURE — 86140 C-REACTIVE PROTEIN: CPT | Performed by: OTHER

## 2024-12-03 PROCEDURE — 86147 CARDIOLIPIN ANTIBODY EA IG: CPT

## 2024-12-03 PROCEDURE — 85732 THROMBOPLASTIN TIME PARTIAL: CPT

## 2024-12-03 PROCEDURE — 85610 PROTHROMBIN TIME: CPT

## 2024-12-03 PROCEDURE — 85652 RBC SED RATE AUTOMATED: CPT | Performed by: OTHER

## 2024-12-03 NOTE — PROGRESS NOTES
NEUROLOGY  [facility]       Meliza Mckeon  11/1/1970  Primary Care Provider:  ESDRAS MONTALVO    12/3/2024  54 year old yo,  was last seen on:: October 2024    Seen for/plans last visit:  She presents with a rather sudden onset pain left temporal area with bilateral face tingling after a sudden jerking of the head and neck (with right turning). The unfortunate part is not no immediate scanning was done (CT or MRI) to prove an acute process going on, rather CT and MRI brain and CTA was done a month later.     Previous visit and existing record notes reviewed in preparation for the face to face visit.  Relevant labs and studies reviewed and will be noted in relevant areas of this record.  Accompanied visit:     (x) No.      Present condition:  She continues to have the same pain in the neck and tingling in the face with pain in the left parietal region.    Prednisone trial, besides making her feel horrible, really did not modify her symptomatology.    Because of the circumferential distribution white matter changes, we obtained an echocardiogram with microbubble to make sure she does not have any PFO and this was negative.        Past History update/new problem(s): as above    Review of Systems:  Review of Systems:  Denies systemic symptoms     No CP or SOB.  No GI or  symptoms. Relevant Neuro as noted above.      Medications:      Current Outpatient Medications:     ATENOLOL OR, Take 12.5 mg by mouth daily., Disp: , Rfl:     cyclobenzaprine 5 MG Oral Tab, Take 1 tablet (5 mg total) by mouth nightly as needed., Disp: , Rfl:     topiramate 25 MG Oral Tab, Take 1 tablet (25 mg total) by mouth 2 (two) times daily., Disp: , Rfl:     Multiple Vitamin (ONE-DAILY MULTI VITAMINS) Oral Tab, Take 1 tablet by mouth daily., Disp: , Rfl:     Cholecalciferol (VITAMIN D) 50 MCG (2000 UT) Oral Tab, Take by mouth., Disp: , Rfl:     Omega-3 1000 MG Oral Cap, Take by mouth., Disp: , Rfl:     magnesium oxide 400 MG  Oral Tab, Take 1 tablet (400 mg total) by mouth daily. (Patient not taking: Reported on 12/3/2024), Disp: , Rfl:   PRN:     Allergies:  Allergies[1]       EXAM:  /72 (BP Location: Left arm, Patient Position: Sitting, Cuff Size: adult)   Pulse 80   Resp 16   Ht 69\"   Wt 177 lb (80.3 kg)   LMP  (LMP Unknown)   BMI 26.14 kg/m²   Looks stated age  General Exam:  HENT:  pink conjunctiva anicteric sclerae  Neck no adenopathy, thyroid normal  Heart and Lungs:  normal  Extremities: no cyanosis, skin changes    NEURO  NEURO  Able to relate events with fluent speech and intact comprehension  CN 2-12: pupils reactive, VF full face symmetric sensation and movement tongue midline  No motor focal findings  Sensory: no lateralizing findings  Reflexes are symmetric  UMN signs: none  Gait: narrow based    Tenderness noted in the left and left parietal area      INTERPRETATION of RELEVANT LABS and other DATA:    As outlined and referenced above in the HPI      Problem/s Identified this visit:   1. Cerebrovascular accident (CVA) due to embolism of other precerebral artery (HCC)    2. Small vessel disease, cerebrovascular    3. Cervical myofascial pain syndrome          Discussion plus Diagnostics & Treatment Orders:  Orders Placed This Encounter    Viola Quevedo (Automated)    Rheumatoid Arthritis Factor    C-Reactive Protein    Anti-Nuclear Antibody (JUAN) by IFA, Reflex Titer + Specific Antibodies     Standing Status:   Future     Number of Occurrences:   1     Standing Expiration Date:   12/3/2025     Order Specific Question:   Release to patient     Answer:   Immediate    Lupus Anticoagulant/Antiphospholipid Panel     Panel includes: Lupus Anticoagulant Comp; Beta 2 Glycoprotein I AB G/M; Anticardiolipin AB, IgM Qn; and Anticardiolipin AB, IgG Qn.     Standing Status:   Future     Number of Occurrences:   1     Standing Expiration Date:   12/3/2025     I have no solid recommendation right now but for the white  matter changes, I would just recommend a repeat MRI in a year to demonstrate structural stability and absence of any progression.  In the meantime, above screening to rule out any other exotic causes of subcortical strokes recommended; mostly antiphospholipid syndrome as well as other causes for such white matter changes.    Meanwhile physical therapy geared towards her neck and cranial muscle tenderness will be ordered.      (x) Discussed potential side effects of any treatment relevant to above.  Includes explanation of tests as necessary.    Return in about 6 months (around 6/3/2025).      Patient understands that if needed, based on condition and or test results, follow up will be readjusted      Demetris Leary MD  Vascular & General Neurology  Director, Multiple Sclerosis Program  [facility]  12/3/2024, Time completed 10:31 AM    Decision making:  ( x ) labs reviewed/ordered - 1  (  ) new diagnosis: - 1  ( x) Images & studies independently reviewed -non F2F  (  ) Case/studies discussed with other caregivers - -non F2F  (  ) Telephone time with patiern or authorized Fam member--non F2F  ( x ) other records reviewed --non F2F including consultations  (  ) [facility] meetings - patient not present --non F2F  (  ) Independent Historian obtained    Non Face to Face CPT code 36026/98123 applies as documented above    PROCEDURE DONE     (   ) see notes        After visit, patient was escorted out and handed-off discharge process and instructions to the check out desk.  No additional issues relevant to visit were raised to staff at this time interval.        This document is to be interpreted as my current opinion regarding the case as of the stated date of service based on the information available to me at this time and may supersedes any prior opinion expressed either orally or in writing.  Services rendered are only within the scope of direct medical care  Sometimes, reports may have been prepared  partially using a speech recognition software technology.  If a word or phrase is confusing or out of context, please do not hesitate to call for clarification.              [1]   Allergies  Allergen Reactions    Sulfa Antibiotics RASH     HIVES    Nsaids OTHER (SEE COMMENTS)     Kidney disease

## 2024-12-03 NOTE — TELEPHONE ENCOUNTER
Patient was seen in the office today and advised to start gabapentin.    Patient prefers 90 day refill    Send to:  [facility]

## 2024-12-03 NOTE — TELEPHONE ENCOUNTER
Informed patient that Dr. Leary's note for tokana has not been completed yet and at the start of a new medication, it is not advised to start with a 90 day supply if in the event that the dosage need to be adjusted.  Patient verbalizes understanding.

## 2024-12-03 NOTE — PATIENT INSTRUCTIONS
Refill policies:    Allow 2-3 business days for refills; controlled substances may take longer.  Contact your pharmacy at least 5 days prior to running out of medication and have them send an electronic request or submit request through the “request refill” option in your [employer] account.  Refills are not addressed on weekends; covering physicians do not authorize routine medications on weekends.  No narcotics or controlled substances are refilled after noon on Fridays or by on call physicians.  By law, narcotics must be electronically prescribed.  A 30 day supply with no refills is the maximum allowed.  If your prescription is due for a refill, you may be due for a follow up appointment.  To best provide you care, patients receiving routine medications need to be seen at least once a year.  Patients receiving narcotic/controlled substance medications need to be seen at least once every 3 months.  In the event that your preferred pharmacy does not have the requested medication in stock (e.g. Backordered), it is your responsibility to find another pharmacy that has the requested medication available.  We will gladly send a new prescription to that pharmacy at your request.    Scheduling Tests:    If your physician has ordered radiology tests such as MRI or CT scans, please contact Central Scheduling at [phone] right away to schedule the test.  Once scheduled, the [facility] Centralized Referral Team will work with your insurance carrier to obtain pre-certification or prior authorization.  Depending on your insurance carrier, approval may take 3-10 days.  It is highly recommended patients assure they have received an authorization before having a test performed.  If test is done without insurance authorization, patient may be responsible for the entire amount billed.      Precertification and Prior Authorizations:  If your physician has recommended that you have a procedure or additional testing performed the [facility]  Centralized Referral Team will contact your insurance carrier to obtain pre-certification or prior authorization.    You are strongly encouraged to contact your insurance carrier to verify that your procedure/test has been approved and is a COVERED benefit.  Although the [facility] Centralized Referral Team does its due diligence, the insurance carrier gives the disclaimer that \"Although the procedure is authorized, this does not guarantee payment.\"    Ultimately the patient is responsible for payment.   Thank you for your understanding in this matter.  Paperwork Completion:  If you require FMLA or disability paperwork for your recovery, please make sure to either drop it off or have it faxed to our office at [phone]. Be sure the form has your name and date of birth on it.  The form will be faxed to our Forms Department and they will complete it for you.  There is a 25$ fee for all forms that need to be filled out.  Please be aware there is a 10-14 day turnaround time.  You will need to sign a release of information (JACINDA) form if your paperwork does not come with one.  You may call the Forms Department with any questions at [phone].  Their fax number is [phone].

## 2024-12-05 ENCOUNTER — OFFICE VISIT (OUTPATIENT)
Dept: PHYSICAL THERAPY | Age: 54
End: 2024-12-05

## 2024-12-05 ENCOUNTER — TELEPHONE (OUTPATIENT)
Dept: NEUROLOGY | Facility: CLINIC | Age: 54
End: 2024-12-05

## 2024-12-05 DIAGNOSIS — I63.19 CEREBRAL INFARCTION DUE TO EMBOLISM OF OTHER PRECEREBRAL ARTERY (CMD): Primary | ICD-10-CM

## 2024-12-05 DIAGNOSIS — G44.86 CERVICOGENIC HEADACHE: ICD-10-CM

## 2024-12-05 DIAGNOSIS — M54.2 NECK PAIN: Primary | ICD-10-CM

## 2024-12-05 DIAGNOSIS — M79.18 MYOFASCIAL PAIN SYNDROME, CERVICAL: ICD-10-CM

## 2024-12-05 DIAGNOSIS — I67.9 CEREBROVASCULAR SMALL VESSEL DISEASE: ICD-10-CM

## 2024-12-05 DIAGNOSIS — M54.12 CERVICAL RADICULOPATHY: ICD-10-CM

## 2024-12-05 LAB
B2 GLYCOPROT1 IGG SERPL IA-ACNC: 2.3 U/ML (ref ?–7)
B2 GLYCOPROT1 IGM SERPL IA-ACNC: 52 U/ML (ref ?–7)
CARDIOLIPIN IGG SERPL-ACNC: 1.3 GPL (ref ?–10)
CARDIOLIPIN IGM SERPL-ACNC: 6.3 MPL (ref ?–10)
NUCLEAR IGG TITR SER IF: NEGATIVE {TITER}

## 2024-12-05 ASSESSMENT — ENCOUNTER SYMPTOMS
QUALITY: ACHE
ALLEVIATING FACTORS: HEAT
SUBJECTIVE PAIN PROGRESSION: NO CHANGE
QUALITY: TINGLING
PAIN FREQUENCY: CONSTANT
PAIN SCALE AT HIGHEST: 8
PAIN SEVERITY NOW: 6
QUALITY: STIFF
PAIN SCALE AT LOWEST: 1
PAIN LOCATION: NECK

## 2024-12-05 NOTE — TELEPHONE ENCOUNTER
Patient stated has questions about stopping or needs to wean off topiramate.    Please call patient to discuss and advise.

## 2024-12-05 NOTE — TELEPHONE ENCOUNTER
Patient stated completed labs and concerned with some lab results.    Please call patient to discuss and advise.

## 2024-12-09 ENCOUNTER — OFFICE VISIT (OUTPATIENT)
Dept: AUDIOLOGY | Age: 54
End: 2024-12-09
Attending: PHYSICIAN ASSISTANT

## 2024-12-09 ENCOUNTER — APPOINTMENT (OUTPATIENT)
Dept: OTOLARYNGOLOGY | Age: 54
End: 2024-12-09

## 2024-12-09 DIAGNOSIS — H90.3 SENSORINEURAL HEARING LOSS (SNHL) OF BOTH EARS: ICD-10-CM

## 2024-12-09 DIAGNOSIS — J32.9 SINUSITIS, UNSPECIFIED CHRONICITY, UNSPECIFIED LOCATION: ICD-10-CM

## 2024-12-09 DIAGNOSIS — R51.9 LEFT FACIAL PRESSURE AND PAIN: Primary | ICD-10-CM

## 2024-12-09 DIAGNOSIS — H69.93 DYSFUNCTION OF BOTH EUSTACHIAN TUBES: ICD-10-CM

## 2024-12-09 DIAGNOSIS — H93.8X3 PRESSURE SENSATION IN EAR, BILATERAL: Primary | ICD-10-CM

## 2024-12-09 PROCEDURE — 31231 NASAL ENDOSCOPY DX: CPT | Performed by: PHYSICIAN ASSISTANT

## 2024-12-09 PROCEDURE — 92504 EAR MICROSCOPY EXAMINATION: CPT | Performed by: PHYSICIAN ASSISTANT

## 2024-12-09 PROCEDURE — 92567 TYMPANOMETRY: CPT | Performed by: AUDIOLOGIST

## 2024-12-09 PROCEDURE — 99214 OFFICE O/P EST MOD 30 MIN: CPT | Performed by: PHYSICIAN ASSISTANT

## 2024-12-09 RX ORDER — FLUTICASONE PROPIONATE 50 MCG
2 SPRAY, SUSPENSION (ML) NASAL DAILY
Qty: 16 G | Refills: 0 | Status: SHIPPED | OUTPATIENT
Start: 2024-12-09

## 2024-12-10 ENCOUNTER — PATIENT MESSAGE (OUTPATIENT)
Dept: NEUROLOGY | Facility: CLINIC | Age: 54
End: 2024-12-10

## 2024-12-10 DIAGNOSIS — I67.9 SMALL VESSEL DISEASE, CEREBROVASCULAR: Primary | ICD-10-CM

## 2024-12-10 LAB
APTT PPP: 27.7 SECONDS (ref 23–36)
INR BLD: 0.96 (ref 0.85–1.16)
LA 3 SCREEN W REFLEX-IMP: NEGATIVE
PROTHROMBIN TIME: 12.8 SECONDS (ref 11.6–14.8)
SCREEN DRVVT: 1.19 S (ref 0–1.29)
SCREEN DRVVT: NEGATIVE S
STACLOT LA DELTA: 1.8 SECONDS (ref ?–8)

## 2024-12-11 ENCOUNTER — APPOINTMENT (OUTPATIENT)
Dept: PHYSICAL THERAPY | Age: 54
End: 2024-12-11

## 2024-12-11 DIAGNOSIS — G44.86 CERVICOGENIC HEADACHE: ICD-10-CM

## 2024-12-11 DIAGNOSIS — M54.12 CERVICAL RADICULOPATHY: Primary | ICD-10-CM

## 2024-12-11 DIAGNOSIS — M54.2 NECK PAIN: ICD-10-CM

## 2024-12-12 ENCOUNTER — OFFICE VISIT (OUTPATIENT)
Dept: DERMATOLOGY | Age: 54
End: 2024-12-12

## 2024-12-12 DIAGNOSIS — D48.5 NEOPLASM OF UNCERTAIN BEHAVIOR OF SKIN: Primary | ICD-10-CM

## 2024-12-13 ENCOUNTER — APPOINTMENT (OUTPATIENT)
Dept: PHYSICAL THERAPY | Age: 54
End: 2024-12-13

## 2024-12-13 DIAGNOSIS — M54.12 CERVICAL RADICULOPATHY: Primary | ICD-10-CM

## 2024-12-13 DIAGNOSIS — G44.86 CERVICOGENIC HEADACHE: ICD-10-CM

## 2024-12-13 DIAGNOSIS — M54.2 NECK PAIN: ICD-10-CM

## 2024-12-18 ENCOUNTER — APPOINTMENT (OUTPATIENT)
Dept: PHYSICAL THERAPY | Age: 54
End: 2024-12-18

## 2024-12-18 DIAGNOSIS — M54.12 CERVICAL RADICULOPATHY: Primary | ICD-10-CM

## 2024-12-18 DIAGNOSIS — M54.2 NECK PAIN: ICD-10-CM

## 2024-12-18 DIAGNOSIS — G44.86 CERVICOGENIC HEADACHE: ICD-10-CM

## 2024-12-18 PROCEDURE — 97140 MANUAL THERAPY 1/> REGIONS: CPT

## 2024-12-18 PROCEDURE — 97110 THERAPEUTIC EXERCISES: CPT

## 2024-12-18 PROCEDURE — 97112 NEUROMUSCULAR REEDUCATION: CPT

## 2024-12-19 ENCOUNTER — HOSPITAL ENCOUNTER (OUTPATIENT)
Dept: CT IMAGING | Age: 54
Discharge: HOME | End: 2024-12-19
Attending: PHYSICIAN ASSISTANT

## 2024-12-19 DIAGNOSIS — J32.9 SINUSITIS, UNSPECIFIED CHRONICITY, UNSPECIFIED LOCATION: ICD-10-CM

## 2024-12-19 LAB
ASR DISCLAIMER: NORMAL
CASE RPRT: NORMAL
CLINICAL INFO: NORMAL
PATH REPORT.FINAL DX SPEC: NORMAL
PATH REPORT.GROSS SPEC: NORMAL

## 2024-12-19 PROCEDURE — 70486 CT MAXILLOFACIAL W/O DYE: CPT

## 2024-12-20 ENCOUNTER — APPOINTMENT (OUTPATIENT)
Dept: PHYSICAL THERAPY | Age: 54
End: 2024-12-20

## 2024-12-20 DIAGNOSIS — M54.2 NECK PAIN: ICD-10-CM

## 2024-12-20 DIAGNOSIS — G44.86 CERVICOGENIC HEADACHE: ICD-10-CM

## 2024-12-20 DIAGNOSIS — M54.12 CERVICAL RADICULOPATHY: Primary | ICD-10-CM

## 2024-12-27 ENCOUNTER — APPOINTMENT (OUTPATIENT)
Dept: PHYSICAL THERAPY | Age: 54
End: 2024-12-27

## 2024-12-27 DIAGNOSIS — M54.2 NECK PAIN: ICD-10-CM

## 2024-12-27 DIAGNOSIS — G44.86 CERVICOGENIC HEADACHE: ICD-10-CM

## 2024-12-27 DIAGNOSIS — M54.12 CERVICAL RADICULOPATHY: Primary | ICD-10-CM

## 2024-12-30 ENCOUNTER — APPOINTMENT (OUTPATIENT)
Dept: PHYSICAL THERAPY | Age: 54
End: 2024-12-30

## 2024-12-30 DIAGNOSIS — M54.2 NECK PAIN: ICD-10-CM

## 2024-12-30 DIAGNOSIS — G44.86 CERVICOGENIC HEADACHE: ICD-10-CM

## 2024-12-30 DIAGNOSIS — M54.12 CERVICAL RADICULOPATHY: Primary | ICD-10-CM

## 2024-12-30 PROCEDURE — 97112 NEUROMUSCULAR REEDUCATION: CPT

## 2024-12-30 PROCEDURE — 97110 THERAPEUTIC EXERCISES: CPT

## 2024-12-30 PROCEDURE — 97140 MANUAL THERAPY 1/> REGIONS: CPT

## 2024-12-31 ENCOUNTER — APPOINTMENT (OUTPATIENT)
Dept: PHYSICAL THERAPY | Age: 54
End: 2024-12-31

## 2024-12-31 DIAGNOSIS — M54.2 NECK PAIN: ICD-10-CM

## 2024-12-31 DIAGNOSIS — G44.86 CERVICOGENIC HEADACHE: ICD-10-CM

## 2024-12-31 DIAGNOSIS — M54.12 CERVICAL RADICULOPATHY: Primary | ICD-10-CM

## 2024-12-31 PROCEDURE — 97110 THERAPEUTIC EXERCISES: CPT

## 2024-12-31 PROCEDURE — 97112 NEUROMUSCULAR REEDUCATION: CPT

## 2024-12-31 PROCEDURE — G0283 ELEC STIM OTHER THAN WOUND: HCPCS

## 2024-12-31 PROCEDURE — 97140 MANUAL THERAPY 1/> REGIONS: CPT

## 2024-12-31 ASSESSMENT — ENCOUNTER SYMPTOMS
PAIN SCALE AT HIGHEST: 7
PAIN SCALE AT LOWEST: 1
PAIN SEVERITY NOW: 3

## 2025-01-08 ENCOUNTER — APPOINTMENT (OUTPATIENT)
Dept: PHYSICAL THERAPY | Age: 55
End: 2025-01-08

## 2025-01-10 ENCOUNTER — APPOINTMENT (OUTPATIENT)
Dept: PHYSICAL THERAPY | Age: 55
End: 2025-01-10

## 2025-01-14 ENCOUNTER — APPOINTMENT (OUTPATIENT)
Dept: PHYSICAL THERAPY | Age: 55
End: 2025-01-14

## 2025-01-17 ENCOUNTER — APPOINTMENT (OUTPATIENT)
Dept: PHYSICAL THERAPY | Age: 55
End: 2025-01-17

## 2025-01-21 ENCOUNTER — APPOINTMENT (OUTPATIENT)
Dept: PHYSICAL THERAPY | Age: 55
End: 2025-01-21

## 2025-02-05 ENCOUNTER — APPOINTMENT (OUTPATIENT)
Dept: DERMATOLOGY | Age: 55
End: 2025-02-05

## 2025-03-10 ENCOUNTER — APPOINTMENT (OUTPATIENT)
Dept: OBGYN | Age: 55
End: 2025-03-10

## 2025-03-21 ENCOUNTER — APPOINTMENT (OUTPATIENT)
Dept: NEPHROLOGY | Age: 55
End: 2025-03-21

## 2025-04-03 ENCOUNTER — PATIENT MESSAGE (OUTPATIENT)
Dept: NEUROLOGY | Facility: CLINIC | Age: 55
End: 2025-04-03

## 2025-04-03 DIAGNOSIS — I67.9 SMALL VESSEL DISEASE, CEREBROVASCULAR: ICD-10-CM

## 2025-04-03 DIAGNOSIS — I63.19 CEREBROVASCULAR ACCIDENT (CVA) DUE TO EMBOLISM OF OTHER PRECEREBRAL ARTERY (HCC): Primary | ICD-10-CM

## 2025-04-04 ENCOUNTER — LAB ENCOUNTER (OUTPATIENT)
Dept: LAB | Age: 55
End: 2025-04-04
Attending: Other
Payer: COMMERCIAL

## 2025-04-04 DIAGNOSIS — I67.9 SMALL VESSEL DISEASE, CEREBROVASCULAR: ICD-10-CM

## 2025-04-04 PROCEDURE — 86147 CARDIOLIPIN ANTIBODY EA IG: CPT

## 2025-04-04 PROCEDURE — 85613 RUSSELL VIPER VENOM DILUTED: CPT

## 2025-04-04 PROCEDURE — 85705 THROMBOPLASTIN INHIBITION: CPT

## 2025-04-04 PROCEDURE — 85610 PROTHROMBIN TIME: CPT

## 2025-04-04 PROCEDURE — 86146 BETA-2 GLYCOPROTEIN ANTIBODY: CPT

## 2025-04-04 PROCEDURE — 85732 THROMBOPLASTIN TIME PARTIAL: CPT

## 2025-04-04 PROCEDURE — 36415 COLL VENOUS BLD VENIPUNCTURE: CPT

## 2025-04-07 LAB
B2 GLYCOPROT1 IGG SERPL IA-ACNC: 4.2 U/ML (ref ?–7)
B2 GLYCOPROT1 IGM SERPL IA-ACNC: 67 U/ML (ref ?–7)
CARDIOLIPIN IGG SERPL-ACNC: 1.5 GPL (ref ?–10)
CARDIOLIPIN IGM SERPL-ACNC: 8.7 MPL (ref ?–10)

## 2025-04-08 LAB
APTT PPP: 28 SECONDS (ref 23–36)
INR BLD: 0.9 (ref 0.85–1.16)
LA 3 SCREEN W REFLEX-IMP: NEGATIVE
PROTHROMBIN TIME: 12.2 SECONDS (ref 11.6–14.8)
SCREEN DRVVT: 1.08 S (ref 0–1.29)
SCREEN DRVVT: NEGATIVE S
STACLOT LA DELTA: 6.5 SECONDS (ref ?–8)

## 2025-04-09 ENCOUNTER — LAB SERVICES (OUTPATIENT)
Dept: LAB | Age: 55
End: 2025-04-09

## 2025-04-09 ENCOUNTER — APPOINTMENT (OUTPATIENT)
Dept: OBGYN | Age: 55
End: 2025-04-09

## 2025-04-09 ENCOUNTER — E-ADVICE (OUTPATIENT)
Dept: OBGYN | Age: 55
End: 2025-04-09

## 2025-04-09 VITALS
HEART RATE: 60 BPM | DIASTOLIC BLOOD PRESSURE: 68 MMHG | SYSTOLIC BLOOD PRESSURE: 112 MMHG | RESPIRATION RATE: 20 BRPM | BODY MASS INDEX: 27.41 KG/M2 | WEIGHT: 185.6 LBS

## 2025-04-09 DIAGNOSIS — Z12.31 VISIT FOR SCREENING MAMMOGRAM: ICD-10-CM

## 2025-04-09 DIAGNOSIS — Z00.00 LABORATORY EXAM ORDERED AS PART OF ROUTINE GENERAL MEDICAL EXAMINATION: ICD-10-CM

## 2025-04-09 DIAGNOSIS — N95.2 VAGINAL ATROPHY: ICD-10-CM

## 2025-04-09 DIAGNOSIS — Z01.419 WELL WOMAN EXAM: ICD-10-CM

## 2025-04-09 DIAGNOSIS — Q87.81 ALPORT'S SYNDROME (CMD): ICD-10-CM

## 2025-04-09 DIAGNOSIS — Z01.419 ENCOUNTER FOR GYNECOLOGICAL EXAMINATION WITHOUT ABNORMAL FINDING: Primary | ICD-10-CM

## 2025-04-09 LAB
25(OH)D3+25(OH)D2 SERPL-MCNC: 32.9 NG/ML (ref 30–100)
ALBUMIN SERPL-MCNC: 3.9 G/DL (ref 3.4–5)
ALBUMIN/GLOB SERPL: 1.3 {RATIO} (ref 1–2.4)
ALP SERPL-CCNC: 82 UNITS/L (ref 45–117)
ALT SERPL-CCNC: 27 UNITS/L
ANION GAP SERPL CALC-SCNC: 10 MMOL/L (ref 7–19)
AST SERPL-CCNC: 21 UNITS/L
BILIRUB SERPL-MCNC: 0.4 MG/DL (ref 0.2–1)
BUN SERPL-MCNC: 13 MG/DL (ref 6–20)
BUN/CREAT SERPL: 15 (ref 7–25)
CALCIUM SERPL-MCNC: 9.7 MG/DL (ref 8.4–10.2)
CHLORIDE SERPL-SCNC: 104 MMOL/L (ref 97–110)
CHOLEST SERPL-MCNC: 195 MG/DL
CHOLEST/HDLC SERPL: 2.1 {RATIO}
CO2 SERPL-SCNC: 31 MMOL/L (ref 21–32)
CREAT SERPL-MCNC: 0.84 MG/DL (ref 0.51–0.95)
EGFRCR SERPLBLD CKD-EPI 2021: 82 ML/MIN/{1.73_M2}
FASTING DURATION TIME PATIENT: 14 HOURS (ref 0–999)
GLOBULIN SER-MCNC: 3 G/DL (ref 2–4)
GLUCOSE SERPL-MCNC: 95 MG/DL (ref 70–99)
HDLC SERPL-MCNC: 91 MG/DL
LDLC SERPL CALC-MCNC: 94 MG/DL
NONHDLC SERPL-MCNC: 104 MG/DL
POTASSIUM SERPL-SCNC: 4 MMOL/L (ref 3.4–5.1)
PROT SERPL-MCNC: 6.9 G/DL (ref 6.4–8.2)
SODIUM SERPL-SCNC: 141 MMOL/L (ref 135–145)
TRIGL SERPL-MCNC: 51 MG/DL
TSH SERPL-ACNC: 1.32 MCUNITS/ML (ref 0.35–5)

## 2025-04-09 PROCEDURE — 80061 LIPID PANEL: CPT | Performed by: INTERNAL MEDICINE

## 2025-04-09 PROCEDURE — 36415 COLL VENOUS BLD VENIPUNCTURE: CPT | Performed by: FAMILY MEDICINE

## 2025-04-09 PROCEDURE — 84443 ASSAY THYROID STIM HORMONE: CPT | Performed by: INTERNAL MEDICINE

## 2025-04-09 PROCEDURE — 80053 COMPREHEN METABOLIC PANEL: CPT | Performed by: INTERNAL MEDICINE

## 2025-04-09 PROCEDURE — 82306 VITAMIN D 25 HYDROXY: CPT | Performed by: INTERNAL MEDICINE

## 2025-04-09 RX ORDER — MAGNESIUM OXIDE 400 MG/1
400 TABLET ORAL
COMMUNITY

## 2025-04-09 ASSESSMENT — ENCOUNTER SYMPTOMS
WHEEZING: 0
TROUBLE SWALLOWING: 0
EYE REDNESS: 0
APPETITE CHANGE: 0
BLOOD IN STOOL: 0
EYE DISCHARGE: 0
CONSTIPATION: 0
UNEXPECTED WEIGHT CHANGE: 0
SHORTNESS OF BREATH: 0

## 2025-04-10 ENCOUNTER — E-ADVICE (OUTPATIENT)
Dept: FAMILY MEDICINE | Age: 55
End: 2025-04-10

## 2025-04-10 NOTE — TELEPHONE ENCOUNTER
Patient was calling to make sure we received her massage from 4/3. I advised that her message was sent to Dr. Leary. She would like to speak to someone to see if the MRI order can be placed as well as her lab results. Please contact patient today.

## 2025-04-11 ENCOUNTER — TELEPHONE (OUTPATIENT)
Age: 55
End: 2025-04-11

## 2025-04-11 NOTE — TELEPHONE ENCOUNTER
Patient is returning call to schedule consult. Please contact patient at earliest convenience. Patient expressed concern with scheduling process. Informed patient that the new consult  will return patient's call when back in office.

## 2025-04-11 NOTE — TELEPHONE ENCOUNTER
Pt is calling to schedule a new consult appt.  Patient Name- Mike Haider   New Consult-   Referred by- Gibran Morris  Reason-Cerebrovascular accident, due to embolism of other precerebral artery, Small vessel disease, cerebrovascular, Cervical myofascial pain syndrome   Insurance-  Please give pt a call back. Thank you.    Referral is in system , records should be as well.

## 2025-04-14 ENCOUNTER — TELEPHONE (OUTPATIENT)
Dept: NEPHROLOGY | Age: 55
End: 2025-04-14

## 2025-04-14 ENCOUNTER — TELEPHONE (OUTPATIENT)
Age: 55
End: 2025-04-14

## 2025-04-14 DIAGNOSIS — R31.29 MICROSCOPIC HEMATURIA: Primary | ICD-10-CM

## 2025-04-14 DIAGNOSIS — R80.9 PROTEINURIA, UNSPECIFIED TYPE: ICD-10-CM

## 2025-04-14 DIAGNOSIS — I10 ESSENTIAL HYPERTENSION: ICD-10-CM

## 2025-04-16 ENCOUNTER — E-ADVICE (OUTPATIENT)
Dept: NEPHROLOGY | Age: 55
End: 2025-04-16

## 2025-04-16 ENCOUNTER — APPOINTMENT (OUTPATIENT)
Dept: NEUROLOGY | Age: 55
End: 2025-04-16

## 2025-04-22 ENCOUNTER — LAB ENCOUNTER (OUTPATIENT)
Dept: LAB | Age: 55
End: 2025-04-22
Attending: INTERNAL MEDICINE
Payer: COMMERCIAL

## 2025-04-22 ENCOUNTER — OFFICE VISIT (OUTPATIENT)
Age: 55
End: 2025-04-22
Attending: INTERNAL MEDICINE
Payer: COMMERCIAL

## 2025-04-22 VITALS
HEART RATE: 63 BPM | WEIGHT: 183.5 LBS | BODY MASS INDEX: 27.18 KG/M2 | DIASTOLIC BLOOD PRESSURE: 79 MMHG | RESPIRATION RATE: 16 BRPM | OXYGEN SATURATION: 100 % | SYSTOLIC BLOOD PRESSURE: 118 MMHG | TEMPERATURE: 98 F | HEIGHT: 69.02 IN

## 2025-04-22 DIAGNOSIS — Z13.29 SCREENING FOR THYROID DISORDER: ICD-10-CM

## 2025-04-22 DIAGNOSIS — D64.9 ANEMIA, UNSPECIFIED TYPE: ICD-10-CM

## 2025-04-22 DIAGNOSIS — I67.82 CEREBRAL ISCHEMIA: ICD-10-CM

## 2025-04-22 DIAGNOSIS — D68.61 APS (ANTIPHOSPHOLIPID SYNDROME) (HCC): Primary | ICD-10-CM

## 2025-04-22 LAB
APTT PPP: 28.9 SECONDS (ref 23–36)
CREAT UR-SCNC: 122 MG/DL
CRP SERPL-MCNC: <0.4 MG/DL (ref ?–0.5)
FOLATE SERPL-MCNC: 36 NG/ML (ref 5.4–?)
INR BLD: 1.01 (ref 0.8–1.2)
PROT UR-MCNC: 8.5 MG/DL (ref ?–14)
PROTHROMBIN TIME: 13.4 SECONDS (ref 11.6–14.8)
T4 FREE SERPL-MCNC: 1.1 NG/DL (ref 0.8–1.7)
TSI SER-ACNC: 1.43 UIU/ML (ref 0.55–4.78)
VIT B12 SERPL-MCNC: 645 PG/ML (ref 211–911)

## 2025-04-22 PROCEDURE — 85613 RUSSELL VIPER VENOM DILUTED: CPT

## 2025-04-22 PROCEDURE — 86140 C-REACTIVE PROTEIN: CPT

## 2025-04-22 PROCEDURE — 86147 CARDIOLIPIN ANTIBODY EA IG: CPT

## 2025-04-22 PROCEDURE — 82607 VITAMIN B-12: CPT

## 2025-04-22 PROCEDURE — 85610 PROTHROMBIN TIME: CPT

## 2025-04-22 PROCEDURE — 82746 ASSAY OF FOLIC ACID SERUM: CPT

## 2025-04-22 PROCEDURE — 84443 ASSAY THYROID STIM HORMONE: CPT

## 2025-04-22 PROCEDURE — 85705 THROMBOPLASTIN INHIBITION: CPT

## 2025-04-22 PROCEDURE — 85730 THROMBOPLASTIN TIME PARTIAL: CPT

## 2025-04-22 PROCEDURE — 86146 BETA-2 GLYCOPROTEIN ANTIBODY: CPT

## 2025-04-22 PROCEDURE — 84439 ASSAY OF FREE THYROXINE: CPT

## 2025-04-22 PROCEDURE — 36415 COLL VENOUS BLD VENIPUNCTURE: CPT

## 2025-04-22 PROCEDURE — 85732 THROMBOPLASTIN TIME PARTIAL: CPT

## 2025-04-22 RX ORDER — ASPIRIN 81 MG/1
81 TABLET, CHEWABLE ORAL DAILY
Qty: 90 TABLET | Refills: 1 | Status: SHIPPED | OUTPATIENT
Start: 2025-04-22 | End: 2025-04-24

## 2025-04-22 NOTE — CONSULTS
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 Kept waiting.   Summer 2024: Neurologist seen from [facility]. MRI and CTA ordered (see results below). Nothing remarkable except  and slight osteophytic indentation left V5.    10/29/24: Evaluated by Dr. Leary (neurology). MRI brain reviewed- showing significant whit matter small vessel disease. \"in the distribution of the circumferential branches of the small vessel. Besides the usual cause of small vessel disease, these locations invoke possible microembolic or hemodynamic mechanisms.\"   12/3/24: Follow up with Dr. Leary. APS panel was checked. Showed positive Beta 2 glycoprotein Antibodies, IgM, with moderate titer of 52. Lupus anticoagulant negative.    25: Repeat APS panel confirmed antiphospholipid syndrome. Positive Beta 2 glycoprotein Antibodies, IgM, with moderate titer of 67. Lupus anticoagulant negative.     History of Present Illness:  Ms. Mckeon is a pleasant 54 year old F with PMHx of cerebrovascular small vessel disease (diagnosed 2024) and Alport syndrome who presents to hematology/oncology today for evaluation and treatment recommendations of elevated Beta 2 glycoprotein IgM antibodies, concerning for antiphospholipid syndrome (APS).     Please review above for a detailed account of their oncology history.     Patient has never had  a clinical episode of venous thromboembolism. She does not smoke cigarettes or drink alcohol. Does not on any estrogen supplementation. Regarding her neurological symptoms, though she no longer has intense neuropathy in her left face, she does still have headache. She has recently had severely painful and swollen knuckles that are bothering her quite a lot. She has never had a miscarriage.    Her maternal aunt and maternal grandmother both had strokes. Her mother had one miscarriage in likely her 2nd trimester. Her maternal aunt, grandmother, her mother, and herself all have Alport syndrome (she is already established with nephrology and has bilateral hearing aids).  She was recommended to start aspirin 81 mg daily by Dr. Leary -- but hasn't started it yet, since she is worried aspirin may affect her kidneys.     She is otherwise very healthy and exercises with ECOG 0. She is preparing for a marathon.    Past Medical History:  Past Medical History[1]    Past Surgical History:  Past Surgical History[2]    Current Outpatient Medications:  Medications Ordered Prior to Encounter[3]    Allergies:   Allergies[4]    Family Medical History:  Family History[5]    Social History:  Social History     Social History Narrative    Not on file     Short Social Hx on File[6]    Review of Systems:  A 10-point ROS was done with pertinent positives and negatives per the HPI.     ECOG Performance Status: 0    Vital Signs:  Height: 175.3 cm (5' 9.02\") (04/22 1102)  Weight: 83.2 kg (183 lb 8 oz) (04/22 1102)  BSA (Calculated - sq m): 1.99 sq meters (04/22 1102)  Pulse: 63 (04/22 1102)  BP: 118/79 (04/22 1102)  Temp: 97.6 °F (36.4 °C) (04/22 1102)  Do Not Use - Resp Rate: --  SpO2: 100 % (04/22 1102)  Wt Readings from Last 6 Encounters:   04/22/25 83.2 kg (183 lb 8 oz)   12/03/24 80.3 kg (177 lb)   10/29/24 80.3 kg (177 lb)       Physical Examination:  General: Well-nourished and well-appearing. No acute distress.  Eyes: EOMI,  bilateral conjunctiva normal. Sclera anicteric.  ENT: Oropharynx is clear. Mucous membranes moist.  Lymph Nodes: No cervical or supraclavicular lymphadenopathy.  Respiratory: Lungs clear to auscultation bilaterally.  Cardiovascular: Regular rate and rhythm, no murmurs. No lower extremity edema.   GI: Soft, non-tender, non-distended with normoactive bowel sounds. No hepatosplenomegaly.  Heme: normal capillary refill. No ecchymosis, petechiae, or purpura.  Neurological: Alert and oriented. No apparent focal sensory or motor deficits  Skin: no rashes or lesions.  Psychiatric: Normal mood and affect.    Data Review  Laboratory Studies:  No results for input(s): \"WBC\", \"HGB\", \"HCT\", \"PLT\", \"MCV\", \"RDW\", \"NEPRELIM\" in the last 168 hours.  No results for input(s): \"NA\", \"K\", \"CL\", \"CO2\", \"BUN\", \"CREATSERUM\", \"GFRAA\", \"GFRNAA\", \"GLU\", \"CA\", \"PHOS\", \"TP\", \"ALB\", \"ALKPHO\", \"AST\", \"ALT\", \"BILT\" in the last 168 hours.    Invalid input(s): \"MAG\"  No results for input(s): \"PT\", \"INR\", \"PTT\", \"FIB\" in the last 168 hours.    Radiographic Studies:  8/12/24 [facility]  MRI BRAIN W WO CONTRAST     HISTORY: Facial pain     TECHNIQUE: MRI of the brain was performed utilizing DWI, T2, T1, FLAIR and SWAN weighted axial as well as T1 weighted sagittal sequences.  Following the uneventful intravenous administration of 8.0 cc of gadavist, T2 weighted axial coronal sequences of brain were obtained.     COMPARISON:  CT brain dated 7/11/2024.     FINDINGS:   Ventricles and sulci are normal in size and configuration, for patient's age..     There are scattered foci of hyperintense T2/FLAIR signal within the white matter of both cerebral hemispheres, nonspecific but most compatible with mild chronic small vessel ischemic changes.  Demyelinating disease is not entirely excluded.      No cerebral edema or mass effect. No diffusion evidence of acute infarct. No evidence of parenchymal hemorrhage on susceptibility weighted imaging. No extra-axial  collection.  No abnormal intracranial enhancement.     Cerebellar tonsils are normal in location. Major vessel flow voids are preserved at the skull base. Bone marrow signal is within normal limits.  The mastoids are normal in signal.  Minimal mucosal thickening within ethmoid air cells.     Multilevel degenerative changes of the cervical spine, inadequately assessed on the localizer sequence.  Mild reversal of the cervical lordosis which may be positional in nature or secondary to muscular spasm.     CONCLUSION:    No acute intracranial abnormality.     Scattered foci of hyperintense T2/FLAIR signal within the white matter of both cerebral hemispheres, nonspecific but most compatible with mild chronic small vessel ischemic changes.  Demyelinating disease is not entirely excluded.     Please see above discussion for complete details.     7/11/24  CT Angiogram Carotid [facility]  FINDINGS:     CTA carotid:   Streak artifact due to dental hardware limits evaluation.     There is a normal three-vessel branching configuration off of the aortic arch.  No high-grade stenosis on the great vessel origins off the aortic arch.  Streak artifact renders assessment of the proximal left common carotid artery nondiagnostic..     No high-grade stenosis involving the right common carotid artery.  No hemodynamically significant stenosis involving the right internal carotid artery within the neck by NASCET criteria.  The right external carotid artery is patent.     No high-grade stenosis involving the left common carotid artery.  No hemodynamically significant stenosis involving the left internal carotid artery within the neck by NASCET criteria.  The left external carotid artery is patent.     No high-grade stenosis involving the right vertebral artery within the neck.     Streak artifact limits assessment of the V1 segment of the left vertebral artery.  There is short segment moderate stenosis involving the V2 segment of the left  vertebral artery at level of C5, presumably secondary to regional mass effect from degenerative osteophytes.     No dissection of the internal carotid or vertebral arteries within the neck, with the limitations of above.     Other:   There is moderate reversal of the cervical lordosis, which may be positional in nature or secondary to muscular spasm.     Biapical pleural-parenchymal scarring.     CTA Brain:   Study is limited secondary to venous contamination.     There is patency of the anterior circulation including the bilateral intracranial internal carotid arteries, and the A1 and M1 segments of the anterior and middle cerebral arteries.     There is patency of the posterior circulation including the bilateral distal vertebral arteries, basilar artery, and P1 segments of the posterior cerebral arteries.     There is no proximal large vessel occlusion noted about the [place].     There is no aneurysm formation identified about the [place].  Please note CTA is less sensitive for aneurysms less than 3 mm in size.       The ventricles and sulci are normal in size and configuration for patient's age.  No CT evidence of acute large territorial infarct.  No cerebral edema or mass effect.  Enhancement of the dural venous sinuses is within normal limits.     CONCLUSION:    No dissection or hemodynamically significant stenosis of the internal carotid or vertebral arteries within the neck, within the limitations of above.     Short segment moderate stenosis involving the V2 segment of the left vertebral artery at level of C5, presumably secondary to regional mass effect from degenerative osteophytes.     No proximal large vessel occlusion noted about the [place].  No aneurysm formation noted about the [place].     Please see above discussion for complete details.       Impression/Recommendations: Ms. Mckeon is a pleasant 54 year old F with PMHx of cerebrovascular small vessel disease  (diagnosed July 2024) and Alport syndrome who presents to hematology/oncology today for evaluation and treatment recommendations of elevated Beta 2 glycoprotein IgM antibodies, concerning for antiphospholipid syndrome (APS).       1. Antiphospholipid syndrome, moderate-risk profile  Patient has two persistently positive anti-beta2 GPI antibody titers (IgM) greater than 40 units, along with unexplained  cerebrovascular small vessel disease that may have been arterial thrombosis -- fitting this criteria for APS.    From literature review, IgG isotype of [id number] has a stronger association with aPL-related clinical events compared with IgM isotypes. However, clinical event (in her case -- possible arterial thrombus causing CVA) -- is still possible from IgM isotype.     APS workup done today 4/22/25:   Total urine protein (random): 8.5 (normal)   Urine creatinine (random) 122    CRP:    ESR:   Connective tissue disease (JUAN screen):   PT/INR:   PTT:   Lupus anticoagulant panel:   Factor V Leiden Mutation:   Factor II prothrombin gene mutation:   Antithrombin III:   Protein C activity:   Protein S activity:   Anticardiolipin Ab:   Quant immunoglobulins:   SPEP:    Iron studies (iron/TIBC, B12, folate, ferritin, soluble transferrin receptor):       Recommendations:  - given that patient is young and likely had arterial thrombosis (rather than venous), I favor long-term anticoagulation with warfarin (INR goal 2-3) over DOAC. This would be for secondary thrombosis prevention. These are the options:    standard-intensity warfarin (INR range 2-3) standard-intensity warfarin (INR range 2-3) + aspirin 81 mg PO every day   Higher-intensity warfarin (INR >3)    While above mentioned workup is pending, I will start aspirin 81 mg PO every day. Patient is agreeable.     - rheumatology referral advised ASAP, given her clinical symptoms of possible rheumatic disease. This would have implications for long-term anticoagulation.  She should be worked up for underlying autoimmune disease    - follow up with neurology recommended, given that she also endorses \"balance issues\" for about 1 decade and that she has lost her balance and almost \"passed out\" at her son's graduation - despite any focal deficits.      Follow Up: Will call patient with all results above for further recommendations (when to start warfarin, and whether she will need both warfarin & aspirin). Will need to be monitored in coumadin clinic when on warfarin.    Return to clinic in 1 month for repeat labs (CBC, CMP, PT/INR) and visit with me.    Linnette Palmer D.O.  [facility]      Note to patient: The 21 Century Cures Act makes medical notes like these available to patients in the interest of transparency. However, be advised this is a medical document. It is intended as peer to peer communication. It is written in medical language and may contain abbreviations or verbiage that are unfamiliar. It may appear blunt or direct. Medical documents are intended to carry relevant information, facts as evident, and the clinical opinion of the practitioner.       In reviewing this note, please be advised that Dragon Voice Recognition software used to dictate the note may have made errors in recognizing some of the words or phrases.     Time spent on this encounter:  Pre-charting/reviewing medical records:  45 minutes  In room with patient obtaining history, performing exam, counseling on diagnosis, and reviewing plan: 60 minutes  Orders/notes:  45 minutes  Total time: 150 minutes  This was a prolonged outpatient visit          [1]   Past Medical History:   Alport syndrome (HCC)   [2]   Past Surgical History:  Procedure Laterality Date    Breast lumpectomy Right    [3]   Current Outpatient Medications on File Prior to Visit   Medication Sig Dispense Refill    ATENOLOL OR Take 25 mg by mouth in the morning.      cyclobenzaprine 5 MG Oral Tab Take 1  tablet (5 mg total) by mouth nightly as needed.      Multiple Vitamin (ONE-DAILY MULTI VITAMINS) Oral Tab Take 1 tablet by mouth in the morning.      Cholecalciferol (VITAMIN D) 50 MCG (2000 UT) Oral Tab Take by mouth.      Omega-3 1000 MG Oral Cap Take by mouth. (Patient not taking: Reported on 4/22/2025)      magnesium oxide 400 MG Oral Tab Take 1 tablet (400 mg total) by mouth daily. (Patient not taking: Reported on 4/22/2025)       No current facility-administered medications on file prior to visit.   [4]   Allergies  Allergen Reactions    Sulfa Antibiotics RASH     HIVES    Nsaids OTHER (SEE COMMENTS)     Kidney disease   [5]   Family History  Problem Relation Age of Onset    Stroke Maternal Grandmother     Stroke Maternal Aunt    [6]   Social History  Socioeconomic History    Marital status:    Tobacco Use    Smoking status: Never     Passive exposure: Never    Smokeless tobacco: Never   Substance and Sexual Activity    Alcohol use: Yes     Comment: rare    Drug use: Never   Other Topics Concern    Caffeine Concern Yes     Comment: ~6 cups tea daily    Exercise Yes     Comment: 5-6 days weekly     Social Drivers of Health     Food Insecurity: Low Risk  (10/9/2024)    Received from [facility]    Food Insecurity     Within the past 12 months, you worried that your food would run out before you got money to buy more.  : Never true     Within the past 12 months, the food you bought just didn't last and you didn't have money to get more. : Never true   Transportation Needs: Not At Risk (10/9/2024)    Received from [facility]    Transportation Needs     In the past 12 months, has lack of reliable transportation kept you from medical appointments, meetings, work or from getting things needed for daily living? : No

## 2025-04-22 NOTE — PROGRESS NOTES
Pt here for new consult for evaluation of antiphospholipid syndrome. Medications and medical history reviewed. PT c/o left sideded head pain that started 6/7/2024 - she follows closely with neurology. No history of DVT/PE. Her maternal aunt and maternal grandmother have history of strokes and Alport's syndrome. Pt has Alport's syndrome as well, follows with nephrology at [facility]. Pt has not started Aspirin 81mg daily due to concern of her kidney function.      Education Record    Learner:  Patient    Disease / Diagnosis: antiphospholipid syndrome    Barriers / Limitations:  None   Comments:    Method:  Discussion   Comments:    General Topics:  Medication, Pain, Side effects and symptom management, and Plan of care reviewed   Comments:    Outcome:  Observed demonstration and Shows understanding   Comments:

## 2025-04-22 NOTE — CONSULTS
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 Kept waiting.  Summer 2024: Neurologist seen from [facility]. MRI and CTA ordered (see results below). Nothing remarkable except  and slight osteophytic indentation left V5.   10/29/24: Evaluated by Dr. Leary (neurology). MRI brain reviewed- showing significant whit matter small vessel disease. \"in the distribution of the circumferential branches of the small vessel. Besides the usual cause of small vessel disease, these locations invoke possible microembolic or hemodynamic mechanisms.\"  12/3/24: Follow up with Dr. Leary. APS panel was checked. Showed positive Beta 2 glycoprotein Antibodies, IgM, with moderate titer of 52. Lupus anticoagulant negative.   25: Repeat APS panel confirmed antiphospholipid syndrome. Positive Beta 2 glycoprotein Antibodies, IgM, with moderate titer of 67. Lupus anticoagulant negative.     History of Present Illness:  Ms. Mckeon is a pleasant 54 year old F with PMHx of cerebrovascular small vessel disease (diagnosed 2024) and Alport syndrome who presents to hematology/oncology today for evaluation and treatment recommendations of elevated Beta 2 glycoprotein IgM antibodies, concerning for antiphospholipid syndrome (APS).     Please review above for a detailed account of their oncology history.     Patient has never had a  clinical episode of venous thromboembolism. She does not smoke cigarettes or drink alcohol. Does not on any estrogen supplementation. Regarding her neurological symptoms, though she no longer has intense neuropathy in her left face, she does still have headache. She has recently had severely painful and swollen knuckles that are bothering her quite a lot. She has never had a miscarriage.    Her maternal aunt and maternal grandmother both had strokes. Her mother had one miscarriage in likely her 2nd trimester. Her maternal aunt, grandmother, her mother, and herself all have Alport syndrome (she is already established with nephrology and has bilateral hearing aids).  She was recommended to start aspirin 81 mg daily by Dr. Leary -- but hasn't started it yet, since she is worried aspirin may affect her kidneys.     She is otherwise very healthy and exercises with ECOG 0. She is preparing for a marathon.      Past Medical History:  Past Medical History[1]    Past Surgical History:  Past Surgical History[2]    Current Outpatient Medications:  Medications Ordered Prior to Encounter[3]    Allergies:   Allergies[4]    Family Medical History:  Family History[5]    Social History:  Social History     Social History Narrative    Not on file     Short Social Hx on File[6]    Review of Systems:  A 10-point ROS was done with pertinent positives and negatives per the HPI.     ECOG Performance Status: 0    Vital Signs:  Height: 175.3 cm (5' 9.02\") (04/22 1102)  Weight: 83.2 kg (183 lb 8 oz) (04/22 1102)  BSA (Calculated - sq m): 1.99 sq meters (04/22 1102)  Pulse: 63 (04/22 1102)  BP: 118/79 (04/22 1102)  Temp: 97.6 °F (36.4 °C) (04/22 1102)  Do Not Use - Resp Rate: --  SpO2: 100 % (04/22 1102)  Wt Readings from Last 6 Encounters:   12/03/24 80.3 kg (177 lb)   10/29/24 80.3 kg (177 lb)       Physical Examination:  General: Well-nourished and well-appearing. No acute distress.  Eyes: EOMI, bilateral conjunctiva normal. Sclera  anicteric.  ENT: Oropharynx is clear. Mucous membranes moist.  Lymph Nodes: No cervical or supraclavicular lymphadenopathy.  Respiratory: Lungs clear to auscultation bilaterally.  Cardiovascular: Regular rate and rhythm, no murmurs. No lower extremity edema.   GI: Soft, non-tender, non-distended with normoactive bowel sounds. No hepatosplenomegaly.  Heme: normal capillary refill. No ecchymosis, petechiae, or purpura.  Neurological: Alert and oriented. No apparent focal sensory or motor deficits  Skin: no rashes or lesions.  Psychiatric: Normal mood and affect.    Data Review  Laboratory Studies:  No results for input(s): \"WBC\", \"HGB\", \"HCT\", \"PLT\", \"MCV\", \"RDW\", \"NEPRELIM\" in the last 168 hours.  No results for input(s): \"NA\", \"K\", \"CL\", \"CO2\", \"BUN\", \"CREATSERUM\", \"GFRAA\", \"GFRNAA\", \"GLU\", \"CA\", \"PHOS\", \"TP\", \"ALB\", \"ALKPHO\", \"AST\", \"ALT\", \"BILT\" in the last 168 hours.    Invalid input(s): \"MAG\"  No results for input(s): \"PT\", \"INR\", \"PTT\", \"FIB\" in the last 168 hours.    Radiographic Studies:  8/12/24 [facility]  MRI BRAIN W WO CONTRAST     HISTORY: Facial pain     TECHNIQUE: MRI of the brain was performed utilizing DWI, T2, T1, FLAIR and SWAN weighted axial as well as T1 weighted sagittal sequences.  Following the uneventful intravenous administration of 8.0 cc of gadavist, T2 weighted axial coronal sequences of brain were obtained.     COMPARISON:  CT brain dated 7/11/2024.     FINDINGS:   Ventricles and sulci are normal in size and configuration, for patient's age..     There are scattered foci of hyperintense T2/FLAIR signal within the white matter of both cerebral hemispheres, nonspecific but most compatible with mild chronic small vessel ischemic changes.  Demyelinating disease is not entirely excluded.      No cerebral edema or mass effect. No diffusion evidence of acute infarct. No evidence of parenchymal hemorrhage on susceptibility weighted imaging. No extra-axial collection.  No abnormal intracranial  enhancement.     Cerebellar tonsils are normal in location. Major vessel flow voids are preserved at the skull base. Bone marrow signal is within normal limits.  The mastoids are normal in signal.  Minimal mucosal thickening within ethmoid air cells.     Multilevel degenerative changes of the cervical spine, inadequately assessed on the localizer sequence.  Mild reversal of the cervical lordosis which may be positional in nature or secondary to muscular spasm.     CONCLUSION:    No acute intracranial abnormality.     Scattered foci of hyperintense T2/FLAIR signal within the white matter of both cerebral hemispheres, nonspecific but most compatible with mild chronic small vessel ischemic changes.  Demyelinating disease is not entirely excluded.     Please see above discussion for complete details.     7/11/24  CT Angiogram Carotid [facility]  FINDINGS:     CTA carotid:   Streak artifact due to dental hardware limits evaluation.     There is a normal three-vessel branching configuration off of the aortic arch.  No high-grade stenosis on the great vessel origins off the aortic arch.  Streak artifact renders assessment of the proximal left common carotid artery nondiagnostic..     No high-grade stenosis involving the right common carotid artery.  No hemodynamically significant stenosis involving the right internal carotid artery within the neck by NASCET criteria.  The right external carotid artery is patent.     No high-grade stenosis involving the left common carotid artery.  No hemodynamically significant stenosis involving the left internal carotid artery within the neck by NASCET criteria.  The left external carotid artery is patent.     No high-grade stenosis involving the right vertebral artery within the neck.     Streak artifact limits assessment of the V1 segment of the left vertebral artery.  There is short segment moderate stenosis involving the V2 segment of the left vertebral artery at level of C5, presumably  secondary to regional mass effect from degenerative osteophytes.     No dissection of the internal carotid or vertebral arteries within the neck, with the limitations of above.       Other:   There is moderate reversal of the cervical lordosis, which may be positional in nature or secondary to muscular spasm.     Biapical pleural-parenchymal scarring.     CTA Brain:   Study is limited secondary to venous contamination.     There is patency of the anterior circulation including the bilateral intracranial internal carotid arteries, and the A1 and M1 segments of the anterior and middle cerebral arteries.     There is patency of the posterior circulation including the bilateral distal vertebral arteries, basilar artery, and P1 segments of the posterior cerebral arteries.     There is no proximal large vessel occlusion noted about the [place].     There is no aneurysm formation identified about the [place].  Please note CTA is less sensitive for aneurysms less than 3 mm in size.       The ventricles and sulci are normal in size and configuration for patient's age.  No CT evidence of acute large territorial infarct.  No cerebral edema or mass effect.  Enhancement of the dural venous sinuses is within normal limits.     CONCLUSION:    No dissection or hemodynamically significant stenosis of the internal carotid or vertebral arteries within the neck, within the limitations of above.     Short segment moderate stenosis involving the V2 segment of the left vertebral artery at level of C5, presumably secondary to regional mass effect from degenerative osteophytes.     No proximal large vessel occlusion noted about the [place].  No aneurysm formation noted about the [place].     Please see above discussion for complete details.       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 Kept waiting.  Summer 2024: Neurologist seen from [facility]. MRI and CTA ordered (see results below). Nothing remarkable except  and slight osteophytic indentation left V5.   10/29/24: Evaluated by Dr. Leary (neurology). MRI brain reviewed- showing significant whit matter small vessel disease. \"in the distribution of the circumferential branches of the small vessel. Besides the usual cause of small vessel disease, these locations invoke possible microembolic or hemodynamic mechanisms.\"  12/3/24: Follow up with Dr. Leary. APS panel was checked. Showed positive Beta 2 glycoprotein Antibodies, IgM, with moderate titer of 52. Lupus anticoagulant negative.   25: Repeat APS panel confirmed antiphospholipid syndrome. Positive Beta 2 glycoprotein Antibodies, IgM, with moderate titer of 67. Lupus anticoagulant negative.     History of Present Illness:  Ms. Mckeon is a pleasant 54 year old F with PMHx of cerebrovascular small vessel disease (diagnosed 2024) and Alport syndrome who presents to hematology/oncology today for evaluation and treatment recommendations of elevated Beta 2 glycoprotein IgM antibodies, concerning for antiphospholipid syndrome (APS).       Antiphospholipid syndrome, moderate-risk profile  Patient has two persistently positive anti-beta2 GPI antibody titers (IgM) greater than 40 units, along with unexplained  cerebrovascular small vessel disease that may have been arterial thrombosis -- fitting this criteria for APS.    From literature review, IgG isotype of anti-[id number] has a stronger association with aPL-related clinical events compared with IgM isotypes. However, clinical event (in her case -- possible arterial thrombus causing CVA) -- is still possible from IgM isotype.     - APS workup done today 25:  Total urine protein (random): 8.5 (normal)  Urine creatinine (random) 122    CRP:   ESR:  Connective tissue disease (JUAN screen):  PT/INR:  PTT:  Lupus anticoagulant panel:  Factor V Leiden Mutation:  Factor II prothrombin gene mutation:  Antithrombin III:  Protein C activity:  Protein S activity:  Anticardiolipin Ab:  Quant immunoglobulins:  SPEP:   Iron studies (iron/TIBC, B12, folate, ferritin, soluble transferrin receptor):       Recommendations  - given that patient is young and likely had arterial thrombosis (rather than venous), I favor long-term anticoagulation with warfarin (INR goal 2-3) over DOAC. This would be for secondary thrombosis prevention. These are the options:   standard-intensity warfarin (INR range 2-3) standard-intensity warfarin (INR range 2-3) + aspirin 81 mg PO every day  Higher-intensity warfarin (INR >3)    While above mentioned workup is pending, I will start aspirin 81 mg PO every day. Patient is agreeable.     - rheumatology referral advised ASAP, given her clinical symptoms of possible rheumatic disease. This would have implications for long-term anticoagulation. She should be worked up for underlying autoimmune disease    - follow up with neurology recommended, given that she also endorses \"balance issues\" for about 1 decade and that she has lost her balance and almost \"passed out\" at her son's graduation - despite any focal deficits.      Follow Up: Will call patient with all results above for further recommendations (when to start warfarin, and whether she will need both warfarin & aspirin). Will need to be monitored in coumadin clinic when on warfarin.    Return to clinic in 1 month for repeat labs (CBC, CMP, PT/INR) and visit with me.    Linnette Palmer D.O.  [facility]        Note to patient: The 21 Century Cures Act makes medical notes like these available to patients in the interest of transparency. However, be advised this is a medical document. It is intended as peer to peer communication. It is written in  medical language and may contain abbreviations or verbiage that are unfamiliar. It may appear blunt or direct. Medical documents are intended to carry relevant information, facts as evident, and the clinical opinion of the practitioner.       In reviewing this note, please be advised that Dragon Voice Recognition software used to dictate the note may have made errors in recognizing some of the words or phrases.     Time spent on this encounter:  Pre-charting/reviewing medical records:  45 minutes  In room with patient obtaining history, performing exam, counseling on diagnosis, and reviewing plan: 60 minutes  Orders/notes:  45 minutes  Total time: 150 minutes           [1]   Past Medical History:   Alport syndrome (HCC)   [2]   Past Surgical History:  Procedure Laterality Date    Breast lumpectomy Right    [3]   Current Outpatient Medications on File Prior to Visit   Medication Sig Dispense Refill    ATENOLOL OR Take 12.5 mg by mouth daily.      cyclobenzaprine 5 MG Oral Tab Take 1 tablet (5 mg total) by mouth nightly as needed.      topiramate 25 MG Oral Tab Take 1 tablet (25 mg total) by mouth 2 (two) times daily.      Multiple Vitamin (ONE-DAILY MULTI VITAMINS) Oral Tab Take 1 tablet by mouth daily.      Cholecalciferol (VITAMIN D) 50 MCG (2000 UT) Oral Tab Take by mouth.      Omega-3 1000 MG Oral Cap Take by mouth.      magnesium oxide 400 MG Oral Tab Take 1 tablet (400 mg total) by mouth daily. (Patient not taking: Reported on 12/3/2024)       No current facility-administered medications on file prior to visit.   [4]   Allergies  Allergen Reactions    Sulfa Antibiotics RASH     HIVES    Nsaids OTHER (SEE COMMENTS)     Kidney disease   [5]   Family History  Problem Relation Age of Onset    Stroke Maternal Grandmother     Stroke Maternal Aunt    [6]   Social History  Socioeconomic History    Marital status:    Tobacco Use    Smoking status: Never     Passive exposure: Never    Smokeless tobacco: Never    Substance and Sexual Activity    Alcohol use: Yes     Comment: rare    Drug use: Never   Other Topics Concern    Caffeine Concern Yes     Comment: ~6 cups tea daily    Exercise Yes     Comment: 5-6 days weekly     Social Drivers of Health     Food Insecurity: Low Risk  (10/9/2024)    Received from [facility]    Food Insecurity     Within the past 12 months, you worried that your food would run out before you got money to buy more.  : Never true     Within the past 12 months, the food you bought just didn't last and you didn't have money to get more. : Never true   Transportation Needs: Not At Risk (10/9/2024)    Received from [facility]    Transportation Needs     In the past 12 months, has lack of reliable transportation kept you from medical appointments, meetings, work or from getting things needed for daily living? : No

## 2025-04-22 NOTE — PATIENT INSTRUCTIONS
Please start taking Aspirin 81mg daily.     Please establish with rheumatology at [facility], as well as following up with neurology regarding your ongoing balance issues.     Our office will reach out to you directly regarding your lab results and next steps.

## 2025-04-23 LAB
ANTITHROMBIN: 107 %
B2 GLYCOPROT1 IGG SERPL IA-ACNC: 2.3 U/ML (ref ?–7)
B2 GLYCOPROT1 IGM SERPL IA-ACNC: 47 U/ML (ref ?–7)
CARDIOLIPIN IGG SERPL-ACNC: 1.1 GPL (ref ?–10)
CARDIOLIPIN IGM SERPL-ACNC: 9.6 MPL (ref ?–10)
PROTEIN C FUNCTIONAL: 171 %
PROTEIN S FUNCTION: 115 %

## 2025-04-24 ENCOUNTER — TELEPHONE (OUTPATIENT)
Age: 55
End: 2025-04-24

## 2025-04-24 LAB — ACT PROT C RESIST: 1.9 RATIO

## 2025-04-24 RX ORDER — WARFARIN SODIUM 5 MG/1
5 TABLET ORAL NIGHTLY
Qty: 30 TABLET | Refills: 0 | Status: SHIPPED | OUTPATIENT
Start: 2025-04-24

## 2025-04-24 NOTE — TELEPHONE ENCOUNTER
Returned call - informed the pharmacy to cancel Aspirin prescription since patient will be prescribed Warfarin.

## 2025-04-24 NOTE — TELEPHONE ENCOUNTER
Elsa with [facility] is calling due to mediation aspirin was received today and patient is allergic to NSAID.  Wants to know should the medication be dispense?      Call back: [phone] option 2  Reference number: [id number]

## 2025-04-24 NOTE — TELEPHONE ENCOUNTER
Attempted to reach Meliza via telephone as well as her  multiple times- but both phones going to [employer].    In light of confirmed diagnosis of antiphospholipid syndrome, plan is as follows:  - discontinue aspirin   - start warfarin 5 mg tablet nightly, on 4/25/25.  Rx sent to [facility]  - get labwork checked Tuesday 4/29/25 (INR and remainder of hypercoagulable testing)      I will ask our team (Carissa Espinoza RN) to call patient again with recommendations.     Linnette Palmer D.O.  [facility]

## 2025-04-25 LAB
APTT PPP: 28.5 SECONDS (ref 23–36)
INR BLD: 0.98 (ref 0.85–1.16)
LA 3 SCREEN W REFLEX-IMP: NEGATIVE
PROTHROMBIN TIME: 13.1 SECONDS (ref 11.6–14.8)
SCREEN DRVVT: 1.08 S (ref 0–1.29)
SCREEN DRVVT: NEGATIVE S
STACLOT LA DELTA: 5 SECONDS (ref ?–8)

## 2025-04-30 ENCOUNTER — TELEPHONE (OUTPATIENT)
Age: 55
End: 2025-04-30

## 2025-05-01 ENCOUNTER — TELEMEDICINE (OUTPATIENT)
Age: 55
End: 2025-05-01
Attending: INTERNAL MEDICINE
Payer: COMMERCIAL

## 2025-05-01 ENCOUNTER — TELEPHONE (OUTPATIENT)
Age: 55
End: 2025-05-01

## 2025-05-01 ENCOUNTER — ANTI-COAG VISIT (OUTPATIENT)
Age: 55
End: 2025-05-01

## 2025-05-01 DIAGNOSIS — D68.61 APS (ANTIPHOSPHOLIPID SYNDROME) (HCC): Primary | ICD-10-CM

## 2025-05-01 NOTE — TELEPHONE ENCOUNTER
LVM regarding video visit - adjusting appointment time to 1:00pm. Callback number provided, Alai message sent.

## 2025-05-01 NOTE — PROGRESS NOTES
Telehealth outside of [facility]  Telehealth Verbal Consent   I conducted a telehealth visit with Meliza Mckeon today, 05/01/25, which was completed using two-way, real-time interactive audio and video communication. This has been done in good ina to provide continuity of care in the best interest of the provider-patient relationship. Every conscious effort was taken to allow for sufficient and adequate time to complete the visit.  The patient was made aware of the limitations of the telehealth visit, including treatment limitations as no physical exam could be performed.  The patient was advised to call 911 or to go to the ER in case there was an emergency.  The patient was also advised of the potential privacy & security concerns related to the telehealth platform.   The patient was made aware of where to find [facility]'s notice of privacy practices, telehealth consent form and other related consent forms and documents.  which are located on the [facility] website. The patient verbally agreed to telehealth consent form, related consents and the risks discussed.    Lastly, the patient confirmed that they were in [place].   Included in this visit, time may have been spent reviewing labs, medications, radiology tests and decision making. Appropriate medical decision-making and tests are ordered as detailed in the plan of care above.  Coding/billing information is submitted for this visit based on complexity of care and/or time spent for the visit.      [facility] Return Visit Note    Patient Name: Meliza Mckeon  Medical Record Number: [id number]    YOB: 1970   Date of Service: 5/1/2025     Reason for Consultation/Chief Complaint:  APS, cerebrovascular small vessel disease  Physician requesting consultation:  Demetris Leary MD    Interval History  Patient seen on video visit this afternoon.    I spent a total of 120 minutes prior to this video visit to help prepare answer patient's  extensive list of questions, which included a total of 25 questions typed up on a Word Document. This required literature review and compiling multiple sources of information- outside of this video visit.     I went over every question and answer she had during this video visit.    I reiterated that patient DOES have a confirmed diagnosis of Antiphospholipid syndrome, which requires anticoagulation with warfarin -- in the setting of previous cerebrovascular event 2024, which is considered a sequelae of arterial thrombosis.       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 Kept waiting.   Summer 2024: Neurologist seen from [facility], [place]. MRI and CTA ordered (see results below). Nothing remarkable except  and slight osteophytic indentation left V5.    10/29/24: Evaluated by Dr. Leary (neurology). MRI brain reviewed- showing significant whit matter small vessel disease. \"in the distribution of the circumferential branches of the small vessel. Besides the usual cause of small vessel disease, these locations invoke possible microembolic or hemodynamic mechanisms.\"   12/3/24: Follow up with Dr. Leary. APS panel was checked. Showed positive Beta 2 glycoprotein Antibodies, IgM, with moderate titer of 52. Lupus anticoagulant negative.    25: Repeat APS panel confirmed antiphospholipid syndrome. Positive Beta 2 glycoprotein Antibodies, IgM, with moderate titer of 67. Lupus anticoagulant negative.   25: APS panel once again with consistently elevated B2 glycoprotein Antibodies (IgM 47). Lupus anticoagulant negative.   25: I called patient multiple times to relay that she has a confirmed diagnosis of APS and to give her recommendations. As she was unreachable on phone, I sent her information on  [employer] about her diagnosis and further recommendations - which she did read. I advised to discontinue aspirin and start Warfarin 5 mg nightly, as warfarin is absolutely necessary.   She replied back relaying that she was on vacation at the time and that she \"would like a few days to process this information\" first- and that she would continue aspirin at the time.   4/29/25: After we reached out once again to see what her decision was about warfarin and whether she had any other questions- patient sent a Word document with 24 questions regarding diagnosis -- via [employer]. She stated \"I am hesitant to start a life altering medication like Warfarin until and unless it is necessary. I would like to see the rheumatologist and see if he wants to run labs. It seems that would be better to do before beginning Warfarin. Unfortunately, I am unable to get into that appointment until 7/1.\" In light of her extensive list of questions that is more appropriate for a follow up visit, we scheduled a video visit on 5/1/25.           History of Present Illness:  Ms. Mckeon is a 54 year old F with PMHx of cerebrovascular small vessel disease (diagnosed July 2024) and Alport syndrome who presents to hematology/oncology today for evaluation and treatment recommendations of elevated Beta 2 glycoprotein IgM antibodies, concerning for antiphospholipid syndrome (APS).     Please review above for a detailed account of their oncology history.     Patient has never had a clinical episode of venous thromboembolism. She does not smoke cigarettes or drink alcohol. Does not on any estrogen supplementation. Regarding her neurological symptoms, though she no longer has intense neuropathy in her left face, she does still have headache. She has recently had severely painful and swollen knuckles that are bothering her quite a lot. She has never had a miscarriage.    Her maternal aunt and maternal grandmother both had strokes. Her mother had one  miscarriage in likely her 2nd trimester. Her maternal aunt, grandmother, her mother, and herself all have Alport syndrome (she is already established with nephrology and has bilateral hearing aids).  She was recommended to start aspirin 81 mg daily by Dr. Leary -- but hasn't started it yet, since she is worried aspirin may affect her kidneys.     She is otherwise very healthy and exercises with ECOG 0. She is preparing for a marathon.    Past Medical History:  Past Medical History[1]    Past Surgical History:  Past Surgical History[2]    Current Outpatient Medications:  Medications Ordered Prior to Encounter[3]    Allergies:   Allergies[4]    Family Medical History:  Family History[5]    Social History:  Social History     Social History Narrative    Not on file     Short Social Hx on File[6]    Review of Systems:  A 10-point ROS was done with pertinent positives and negatives per the HPI.     ECOG Performance Status: 0    Vital Signs:  Height: --  Weight: --  BSA (Calculated - sq m): --  Pulse: --  BP: --  Temp: --  Do Not Use - Resp Rate: --  SpO2: --  Wt Readings from Last 6 Encounters:   04/22/25 83.2 kg (183 lb 8 oz)   12/03/24 80.3 kg (177 lb)   10/29/24 80.3 kg (177 lb)       Physical Examination:  General: Well-nourished and well-appearing. No acute distress.  Eyes: EOMI, bilateral conjunctiva normal. Sclera anicteric.  ENT: Oropharynx is clear. Mucous membranes moist.  Lymph Nodes: No cervical or supraclavicular lymphadenopathy.  Respiratory: Lungs clear to auscultation bilaterally.  Cardiovascular: Regular rate and rhythm, no murmurs. No lower extremity edema.   GI: Soft, non-tender, non-distended with normoactive bowel sounds. No hepatosplenomegaly.  Heme: normal capillary refill. No ecchymosis, petechiae, or purpura.  Neurological: Alert and oriented. No apparent focal sensory or motor deficits  Skin: no rashes or lesions.  Psychiatric: Normal mood and affect.    Data Review  Laboratory Studies:  No  results for input(s): \"WBC\", \"HGB\", \"HCT\", \"PLT\", \"MCV\", \"RDW\", \"NEPRELIM\" in the last 168 hours.  No results for input(s): \"NA\", \"K\", \"CL\", \"CO2\", \"BUN\", \"CREATSERUM\", \"GFRAA\", \"GFRNAA\", \"GLU\", \"CA\", \"PHOS\", \"TP\", \"ALB\", \"ALKPHO\", \"AST\", \"ALT\", \"BILT\" in the last 168 hours.    Invalid input(s): \"MAG\"  No results for input(s): \"PT\", \"INR\", \"PTT\", \"FIB\" in the last 168 hours.    Radiographic Studies:  8/12/24 [facility]  MRI BRAIN W WO CONTRAST     HISTORY: Facial pain     TECHNIQUE: MRI of the brain was performed utilizing DWI, T2, T1, FLAIR and SWAN weighted axial as well as T1 weighted sagittal sequences.  Following the uneventful intravenous administration of 8.0 cc of gadavist, T2 weighted axial coronal sequences of brain were obtained.     COMPARISON:  CT brain dated 7/11/2024.     FINDINGS:   Ventricles and sulci are normal in size and configuration, for patient's age..     There are scattered foci of hyperintense T2/FLAIR signal within the white matter of both cerebral hemispheres, nonspecific but most compatible with mild chronic small vessel ischemic changes.  Demyelinating disease is not entirely excluded.      No cerebral edema or mass effect. No diffusion evidence of acute infarct. No evidence of parenchymal hemorrhage on susceptibility weighted imaging. No extra-axial collection.  No abnormal intracranial enhancement.     Cerebellar tonsils are normal in location. Major vessel flow voids are preserved at the skull base. Bone marrow signal is within normal limits.  The mastoids are normal in signal.  Minimal mucosal thickening within ethmoid air cells.     Multilevel degenerative changes of the cervical spine, inadequately assessed on the localizer sequence.  Mild reversal of the cervical lordosis which may be positional in nature or secondary to muscular spasm.     CONCLUSION:    No acute intracranial abnormality.     Scattered foci of hyperintense T2/FLAIR signal within the white matter of both cerebral  hemispheres, nonspecific but most compatible with mild chronic small vessel ischemic changes.  Demyelinating disease is not entirely excluded.     Please see above discussion for complete details.     7/11/24  CT Angiogram Carotid [facility]  FINDINGS:     CTA carotid:   Streak artifact due to dental hardware limits evaluation.     There is a normal three-vessel branching configuration off of the aortic arch.  No high-grade stenosis on the great vessel origins off the aortic arch.  Streak artifact renders assessment of the proximal left common carotid artery nondiagnostic..     No high-grade stenosis involving the right common carotid artery.  No hemodynamically significant stenosis involving the right internal carotid artery within the neck by NASCET criteria.  The right external carotid artery is patent.     No high-grade stenosis involving the left common carotid artery.  No hemodynamically significant stenosis involving the left internal carotid artery within the neck by NASCET criteria.  The left external carotid artery is patent.     No high-grade stenosis involving the right vertebral artery within the neck.     Streak artifact limits assessment of the V1 segment of the left vertebral artery.  There is short segment moderate stenosis involving the V2 segment of the left vertebral artery at level of C5, presumably secondary to regional mass effect from degenerative osteophytes.     No dissection of the internal carotid or vertebral arteries within the neck, with the limitations of above.     Other:   There is moderate reversal of the cervical lordosis, which may be positional in nature or secondary to muscular spasm.     Biapical pleural-parenchymal scarring.     CTA Brain:   Study is limited secondary to venous contamination.     There is patency of the anterior circulation including the bilateral intracranial internal carotid arteries, and the A1 and M1 segments of the anterior and middle cerebral arteries.      There is patency of the posterior circulation including the bilateral distal vertebral arteries, basilar artery, and P1 segments of the posterior cerebral arteries.     There is no proximal large vessel occlusion noted about the [place].     There is no aneurysm formation identified about the [place].  Please note CTA is less sensitive for aneurysms less than 3 mm in size.       The ventricles and sulci are normal in size and configuration for patient's age.  No CT evidence of acute large territorial infarct.  No cerebral edema or mass effect.  Enhancement of the dural venous sinuses is within normal limits.     CONCLUSION:    No dissection or hemodynamically significant stenosis of the internal carotid or vertebral arteries within the neck, within the limitations of above.     Short segment moderate stenosis involving the V2 segment of the left vertebral artery at level of C5, presumably secondary to regional mass effect from degenerative osteophytes.     No proximal large vessel occlusion noted about the [place].  No aneurysm formation noted about the [place].     Please see above discussion for complete details.       Impression/Recommendations: Ms. Mckeon is a pleasant 54 year old F with PMHx of cerebrovascular small vessel disease (diagnosed July 2024) and Alport syndrome who presents to hematology/oncology today for evaluation and treatment recommendations of elevated Beta 2 glycoprotein IgM antibodies, concerning for antiphospholipid syndrome (APS).       1. Antiphospholipid syndrome, moderate-risk profile  Patient has two persistently positive anti-beta2 GPI antibody titers (IgM) greater than 40 units -- at least 12 weeks apart -- , along with unexplained cerebrovascular event (small vessel disease due to arterial thrombosis) in July 2024 -- fitting the criteria for APS.    APS workup done 4/22/25:   Total urine protein (random): 8.5 (normal)   Urine creatinine  (random) 122    CRP: < 0.4   ESR: pending   Connective tissue disease (JUAN screen): pending   Factor V Leiden Mutation: pending   Factor II prothrombin gene mutation: pending    Antithrombin III: 107 (Normal)   Protein C activity: 171 (wnl)    Protein S activity: 115 (wnl)   Act Protein C resistance: 1.9 (slightly low, can be other risk factor for thrombosis)   Quant immunoglobulins: pending    SPEP: pending   Iron studies (iron/TIBC, ferritin, soluble transferrin receptor):       - given that patient is young and had a clinical cerebrovascular event that would be considered arterial thrombosis, she requires long-term (life-long) anticoagulation with warfarin (INR goal 2-3).This is for secondary thrombosis prevention.     - I reviewed in great detail and with clarity that she has confirmed diagnosis of antiphospholipid syndrome, and that all my recommendations are based on evidence-based guidelines.     Recommendations:  - discontinue aspirin 5/1  - start warfarin 5 mg table nightly, starting 5/2  - INR to be checked 5/7  - will draw remainder of labs above on 5/7 (they were ordered on 4/22 but unable to be drawn same day)    - rheumatology evaluation     - follow up with neurology recommended, given that she also endorses \"balance issues\" for about 1 decade and that she has lost her balance and almost \"passed out\" at her son's graduation - despite any focal deficits. I will update Dr. Leary  regarding my recommendations.       Follow Up: 5/20/25 for labs (INR, CBC with diff) and visit with me.       Linnette Palmer D.O.  [facility]      Note to patient: The 21 Century Cures Act makes medical notes like these available to patients in the interest of transparency. However, be advised this is a medical document. It is intended as peer to peer communication. It is written in medical language and may contain abbreviations or verbiage that are unfamiliar. It may appear blunt or  direct. Medical documents are intended to carry relevant information, facts as evident, and the clinical opinion of the practitioner.       In reviewing this note, please be advised that Dragon Voice Recognition software used to dictate the note may have made errors in recognizing some of the words or phrases.     Time spent on this encounter:  Pre-charting/reviewing medical records/reviewing medical literature:  120 minutes  On Video with patient obtaining history, performing exam, counseling on diagnosis, and reviewing plan: 40 minutes  Orders/notes:  20 minutes  Total time: 180 minutes  This was a prolonged outpatient visit          [1]   Past Medical History:   Alport syndrome (HCC)   [2]   Past Surgical History:  Procedure Laterality Date    Breast lumpectomy Right    [3]   Current Outpatient Medications on File Prior to Visit   Medication Sig Dispense Refill    warfarin 5 MG Oral Tab Take 1 tablet (5 mg total) by mouth nightly. 30 tablet 0    ATENOLOL OR Take 25 mg by mouth in the morning.      cyclobenzaprine 5 MG Oral Tab Take 1 tablet (5 mg total) by mouth nightly as needed.      Multiple Vitamin (ONE-DAILY MULTI VITAMINS) Oral Tab Take 1 tablet by mouth in the morning.      Cholecalciferol (VITAMIN D) 50 MCG (2000 UT) Oral Tab Take by mouth.      Omega-3 1000 MG Oral Cap Take by mouth. (Patient not taking: Reported on 4/22/2025)      magnesium oxide 400 MG Oral Tab Take 1 tablet (400 mg total) by mouth daily. (Patient not taking: Reported on 4/22/2025)       No current facility-administered medications on file prior to visit.   [4]   Allergies  Allergen Reactions    Sulfa Antibiotics RASH     HIVES    Nsaids OTHER (SEE COMMENTS)     Kidney disease   [5]   Family History  Problem Relation Age of Onset    Stroke Maternal Grandmother     Stroke Maternal Aunt    [6]   Social History  Socioeconomic History    Marital status:    Tobacco Use    Smoking status: Never     Passive exposure: Never    Smokeless  tobacco: Never   Substance and Sexual Activity    Alcohol use: Yes     Comment: rare    Drug use: Never   Other Topics Concern    Caffeine Concern Yes     Comment: ~6 cups tea daily    Exercise Yes     Comment: 5-6 days weekly     Social Drivers of Health     Food Insecurity: Low Risk  (10/9/2024)    Received from [facility]    Food Insecurity     Within the past 12 months, you worried that your food would run out before you got money to buy more.  : Never true     Within the past 12 months, the food you bought just didn't last and you didn't have money to get more. : Never true   Transportation Needs: Not At Risk (10/9/2024)    Received from [facility]    Transportation Needs     In the past 12 months, has lack of reliable transportation kept you from medical appointments, meetings, work or from getting things needed for daily living? : No      Prednisone Counseling:  I discussed with the patient the risks of prolonged use of prednisone including but not limited to weight gain, insomnia, osteoporosis, mood changes, diabetes, susceptibility to infection, glaucoma and high blood pressure.  In cases where prednisone use is prolonged, patients should be monitored with blood pressure checks, serum glucose levels and an eye exam.  Additionally, the patient may need to be placed on GI prophylaxis, PCP prophylaxis, and calcium and vitamin D supplementation and/or a bisphosphonate.  The patient verbalized understanding of the proper use and the possible adverse effects of prednisone.  All of the patient's questions and concerns were addressed.

## 2025-05-05 ENCOUNTER — PATIENT MESSAGE (OUTPATIENT)
Dept: NEUROLOGY | Facility: CLINIC | Age: 55
End: 2025-05-05

## 2025-05-05 ENCOUNTER — TELEPHONE (OUTPATIENT)
Dept: NEPHROLOGY | Age: 55
End: 2025-05-05

## 2025-05-05 ENCOUNTER — E-ADVICE (OUTPATIENT)
Dept: NEPHROLOGY | Age: 55
End: 2025-05-05

## 2025-05-06 ENCOUNTER — ANTI-COAG VISIT (OUTPATIENT)
Age: 55
End: 2025-05-06

## 2025-05-06 ENCOUNTER — LAB ENCOUNTER (OUTPATIENT)
Dept: LAB | Age: 55
End: 2025-05-06
Attending: INTERNAL MEDICINE
Payer: COMMERCIAL

## 2025-05-06 DIAGNOSIS — D64.9 ANEMIA, UNSPECIFIED TYPE: ICD-10-CM

## 2025-05-06 DIAGNOSIS — I67.82 CEREBRAL ISCHEMIA: ICD-10-CM

## 2025-05-06 DIAGNOSIS — D68.61 APS (ANTIPHOSPHOLIPID SYNDROME) (HCC): Primary | ICD-10-CM

## 2025-05-06 DIAGNOSIS — D68.61 APS (ANTIPHOSPHOLIPID SYNDROME) (HCC): ICD-10-CM

## 2025-05-06 DIAGNOSIS — Z13.29 SCREENING FOR THYROID DISORDER: ICD-10-CM

## 2025-05-06 LAB
ALBUMIN SERPL-MCNC: 5 G/DL (ref 3.2–4.8)
ALBUMIN/GLOB SERPL: 1.8 {RATIO} (ref 1–2)
ALP LIVER SERPL-CCNC: 79 U/L (ref 46–118)
ALT SERPL-CCNC: 22 U/L (ref 10–49)
ANION GAP SERPL CALC-SCNC: 9 MMOL/L (ref 0–18)
AST SERPL-CCNC: 26 U/L (ref ?–34)
BASOPHILS # BLD AUTO: 0.05 X10(3) UL (ref 0–0.2)
BASOPHILS NFR BLD AUTO: 0.9 %
BILIRUB SERPL-MCNC: 0.4 MG/DL (ref 0.3–1.2)
BUN BLD-MCNC: 14 MG/DL (ref 9–23)
C3 SERPL-MCNC: 169.3 MG/DL (ref 90–170)
C4 SERPL-MCNC: 31 MG/DL (ref 12–36)
CALCIUM BLD-MCNC: 10.2 MG/DL (ref 8.7–10.6)
CHLORIDE SERPL-SCNC: 102 MMOL/L (ref 98–112)
CO2 SERPL-SCNC: 29 MMOL/L (ref 21–32)
CREAT BLD-MCNC: 0.98 MG/DL (ref 0.55–1.02)
DEPRECATED HBV CORE AB SER IA-ACNC: 69 NG/ML (ref 50–306)
EGFRCR SERPLBLD CKD-EPI 2021: 68 ML/MIN/1.73M2 (ref 60–?)
EOSINOPHIL # BLD AUTO: 0.06 X10(3) UL (ref 0–0.7)
EOSINOPHIL NFR BLD AUTO: 1.1 %
ERYTHROCYTE [DISTWIDTH] IN BLOOD BY AUTOMATED COUNT: 13.5 %
ERYTHROCYTE [SEDIMENTATION RATE] IN BLOOD: 18 MM/HR (ref 0–30)
FASTING STATUS PATIENT QL REPORTED: YES
GLOBULIN PLAS-MCNC: 2.8 G/DL (ref 2–3.5)
GLUCOSE BLD-MCNC: 98 MG/DL (ref 70–99)
HCT VFR BLD AUTO: 39.7 % (ref 35–48)
HGB BLD-MCNC: 13.2 G/DL (ref 12–16)
HGB RETIC QN AUTO: 37.9 PG (ref 28.2–36.6)
IGA SERPL-MCNC: 309.5 MG/DL (ref 40–350)
IGM SERPL-MCNC: 92.2 MG/DL (ref 50–300)
IMM GRANULOCYTES # BLD AUTO: 0 X10(3) UL (ref 0–1)
IMM GRANULOCYTES NFR BLD: 0 %
IMM RETICS NFR: 0.09 RATIO (ref 0.1–0.3)
IMMUNOGLOBULIN PNL SER-MCNC: 1032 MG/DL (ref 650–1600)
INR BLD: 1.38 (ref 0.8–1.2)
IRON SATN MFR SERPL: 17 % (ref 15–50)
IRON SERPL-MCNC: 67 UG/DL (ref 50–170)
LYMPHOCYTES # BLD AUTO: 2.25 X10(3) UL (ref 1–4)
LYMPHOCYTES NFR BLD AUTO: 42.1 %
MCH RBC QN AUTO: 30.7 PG (ref 26–34)
MCHC RBC AUTO-ENTMCNC: 33.2 G/DL (ref 31–37)
MCV RBC AUTO: 92.3 FL (ref 80–100)
MONOCYTES # BLD AUTO: 0.43 X10(3) UL (ref 0.1–1)
MONOCYTES NFR BLD AUTO: 8.1 %
NEUTROPHILS # BLD AUTO: 2.55 X10 (3) UL (ref 1.5–7.7)
NEUTROPHILS # BLD AUTO: 2.55 X10(3) UL (ref 1.5–7.7)
NEUTROPHILS NFR BLD AUTO: 47.8 %
OSMOLALITY SERPL CALC.SUM OF ELEC: 290 MOSM/KG (ref 275–295)
PLATELET # BLD AUTO: 235 10(3)UL (ref 150–450)
POTASSIUM SERPL-SCNC: 3.9 MMOL/L (ref 3.5–5.1)
PROT SERPL-MCNC: 7.8 G/DL (ref 5.7–8.2)
PROTHROMBIN TIME: 17.1 SECONDS (ref 11.6–14.8)
RBC # BLD AUTO: 4.3 X10(6)UL (ref 3.8–5.3)
RETICS # AUTO: 61.1 X10(3) UL (ref 22.5–147.5)
RETICS/RBC NFR AUTO: 1.4 % (ref 0.5–2.5)
SODIUM SERPL-SCNC: 140 MMOL/L (ref 136–145)
TOTAL IRON BINDING CAPACITY: 404 UG/DL (ref 250–425)
TRANSFERRIN SERPL-MCNC: 331 MG/DL (ref 250–380)
WBC # BLD AUTO: 5.3 X10(3) UL (ref 4–11)

## 2025-05-06 PROCEDURE — 83540 ASSAY OF IRON: CPT

## 2025-05-06 PROCEDURE — 85652 RBC SED RATE AUTOMATED: CPT

## 2025-05-06 PROCEDURE — 85732 THROMBOPLASTIN TIME PARTIAL: CPT

## 2025-05-06 PROCEDURE — 36415 COLL VENOUS BLD VENIPUNCTURE: CPT

## 2025-05-06 PROCEDURE — 86334 IMMUNOFIX E-PHORESIS SERUM: CPT

## 2025-05-06 PROCEDURE — 84165 PROTEIN E-PHORESIS SERUM: CPT

## 2025-05-06 PROCEDURE — 85610 PROTHROMBIN TIME: CPT

## 2025-05-06 PROCEDURE — 86038 ANTINUCLEAR ANTIBODIES: CPT

## 2025-05-06 PROCEDURE — 84238 ASSAY NONENDOCRINE RECEPTOR: CPT

## 2025-05-06 PROCEDURE — 83550 IRON BINDING TEST: CPT

## 2025-05-06 PROCEDURE — 81241 F5 GENE: CPT

## 2025-05-06 PROCEDURE — 85705 THROMBOPLASTIN INHIBITION: CPT

## 2025-05-06 PROCEDURE — 82784 ASSAY IGA/IGD/IGG/IGM EACH: CPT

## 2025-05-06 PROCEDURE — 86160 COMPLEMENT ANTIGEN: CPT

## 2025-05-06 PROCEDURE — 86225 DNA ANTIBODY NATIVE: CPT

## 2025-05-06 PROCEDURE — 85613 RUSSELL VIPER VENOM DILUTED: CPT

## 2025-05-06 PROCEDURE — 85045 AUTOMATED RETICULOCYTE COUNT: CPT

## 2025-05-06 PROCEDURE — 83521 IG LIGHT CHAINS FREE EACH: CPT

## 2025-05-06 PROCEDURE — 86147 CARDIOLIPIN ANTIBODY EA IG: CPT

## 2025-05-06 PROCEDURE — 81240 F2 GENE: CPT

## 2025-05-06 PROCEDURE — 82728 ASSAY OF FERRITIN: CPT

## 2025-05-06 PROCEDURE — 85025 COMPLETE CBC W/AUTO DIFF WBC: CPT

## 2025-05-06 PROCEDURE — 80053 COMPREHEN METABOLIC PANEL: CPT

## 2025-05-07 LAB — F2 C.20210G>A GENO BLD/T: NORMAL

## 2025-05-09 ENCOUNTER — LAB SERVICES (OUTPATIENT)
Dept: LAB | Age: 55
End: 2025-05-09

## 2025-05-09 DIAGNOSIS — I10 ESSENTIAL HYPERTENSION: ICD-10-CM

## 2025-05-09 DIAGNOSIS — R80.9 PROTEINURIA, UNSPECIFIED TYPE: ICD-10-CM

## 2025-05-09 DIAGNOSIS — R31.29 MICROSCOPIC HEMATURIA: ICD-10-CM

## 2025-05-09 LAB
APPEARANCE UR: CLEAR
BACTERIA #/AREA URNS HPF: ABNORMAL /HPF
BILIRUB UR QL STRIP: NEGATIVE
COLOR UR: ABNORMAL
GLUCOSE UR STRIP-MCNC: NEGATIVE MG/DL
HGB UR QL STRIP: ABNORMAL
HYALINE CASTS #/AREA URNS LPF: ABNORMAL /LPF
KETONES UR STRIP-MCNC: NEGATIVE MG/DL
LEUKOCYTE ESTERASE UR QL STRIP: NEGATIVE
NITRITE UR QL STRIP: NEGATIVE
PH UR STRIP: 7 [PH] (ref 5–7)
PROT UR STRIP-MCNC: NEGATIVE MG/DL
RBC #/AREA URNS HPF: ABNORMAL /HPF
SOL TRANSFERRIN RECEPTOR: 18.5 NMOL/L
SP GR UR STRIP: 1.01 (ref 1–1.03)
SQUAMOUS #/AREA URNS HPF: ABNORMAL /HPF
UROBILINOGEN UR STRIP-MCNC: 0.2 MG/DL
WBC #/AREA URNS HPF: ABNORMAL /HPF

## 2025-05-09 PROCEDURE — 81001 URINALYSIS AUTO W/SCOPE: CPT | Performed by: INTERNAL MEDICINE

## 2025-05-11 LAB
APTT PPP: 31.8 SECONDS (ref 23–36)
INR BLD: 1.34 (ref 0.85–1.16)
LA 3 SCREEN W REFLEX-IMP: NEGATIVE
PROTHROMBIN TIME: 16.8 SECONDS (ref 11.6–14.8)
SCREEN DRVVT: 1.16 S (ref 0–1.29)
SCREEN DRVVT: NEGATIVE S
STACLOT LA DELTA: 2.6 SECONDS (ref ?–8)

## 2025-05-13 ENCOUNTER — CLINICAL ABSTRACT (OUTPATIENT)
Dept: HEALTH INFORMATION MANAGEMENT | Facility: OTHER | Age: 55
End: 2025-05-13

## 2025-05-13 ENCOUNTER — APPOINTMENT (OUTPATIENT)
Dept: NEPHROLOGY | Age: 55
End: 2025-05-13

## 2025-05-13 ENCOUNTER — APPOINTMENT (OUTPATIENT)
Dept: LAB | Age: 55
End: 2025-05-13

## 2025-05-13 ENCOUNTER — LAB ENCOUNTER (OUTPATIENT)
Dept: LAB | Age: 55
End: 2025-05-13
Attending: INTERNAL MEDICINE
Payer: COMMERCIAL

## 2025-05-13 VITALS
HEART RATE: 68 BPM | SYSTOLIC BLOOD PRESSURE: 130 MMHG | WEIGHT: 186 LBS | DIASTOLIC BLOOD PRESSURE: 80 MMHG | BODY MASS INDEX: 27.47 KG/M2

## 2025-05-13 DIAGNOSIS — Q87.81 ALPORT'S SYNDROME (CMD): Primary | ICD-10-CM

## 2025-05-13 DIAGNOSIS — N18.2 CKD (CHRONIC KIDNEY DISEASE) STAGE 2, GFR 60-89 ML/MIN: ICD-10-CM

## 2025-05-13 DIAGNOSIS — D68.61 ANTIPHOSPHOLIPID SYNDROME  (CMD): ICD-10-CM

## 2025-05-13 DIAGNOSIS — D68.61 APS (ANTIPHOSPHOLIPID SYNDROME) (HCC): ICD-10-CM

## 2025-05-13 DIAGNOSIS — R31.29 MICROSCOPIC HEMATURIA: ICD-10-CM

## 2025-05-13 LAB
ALBUMIN SERPL ELPH-MCNC: 4.46 G/DL (ref 3.75–5.21)
ALBUMIN/GLOB SERPL: 1.63 {RATIO} (ref 1–2)
ALPHA1 GLOB SERPL ELPH-MCNC: 0.24 G/DL (ref 0.19–0.46)
ALPHA2 GLOB SERPL ELPH-MCNC: 0.62 G/DL (ref 0.48–1.05)
B-GLOBULIN SERPL ELPH-MCNC: 0.95 G/DL (ref 0.68–1.23)
CARDIOLIPIN IGG SERPL-ACNC: 1.2 GPL (ref ?–10)
CARDIOLIPIN IGM SERPL-ACNC: 8 MPL (ref ?–10)
DSDNA IGG SERPL IA-ACNC: 1.5 IU/ML (ref ?–10)
ENA AB SER QL IA: 0.4 UG/L (ref ?–0.7)
ENA AB SER QL IA: NEGATIVE
GAMMA GLOB SERPL ELPH-MCNC: 0.92 G/DL (ref 0.62–1.7)
INR BLD: 2.69 (ref 0.8–1.2)
KAPPA LC FREE SER-MCNC: 1.36 MG/DL (ref 0.33–1.94)
KAPPA LC FREE/LAMBDA FREE SER NEPH: 1.53 {RATIO} (ref 0.26–1.65)
LAMBDA LC FREE SERPL-MCNC: 0.88 MG/DL (ref 0.57–2.63)
PROT SERPL-MCNC: 7.2 G/DL (ref 5.7–8.2)
PROTHROMBIN TIME: 28.8 SECONDS (ref 11.6–14.8)

## 2025-05-13 PROCEDURE — 85610 PROTHROMBIN TIME: CPT

## 2025-05-13 PROCEDURE — 99214 OFFICE O/P EST MOD 30 MIN: CPT | Performed by: INTERNAL MEDICINE

## 2025-05-13 PROCEDURE — 36415 COLL VENOUS BLD VENIPUNCTURE: CPT

## 2025-05-13 RX ORDER — WARFARIN SODIUM 5 MG/1
5 TABLET ORAL
COMMUNITY
Start: 2025-04-24

## 2025-05-16 RX ORDER — WARFARIN SODIUM 5 MG/1
5 TABLET ORAL NIGHTLY
Qty: 30 TABLET | Refills: 0 | Status: SHIPPED | OUTPATIENT
Start: 2025-05-16

## 2025-05-20 ENCOUNTER — OFFICE VISIT (OUTPATIENT)
Age: 55
End: 2025-05-20
Attending: INTERNAL MEDICINE
Payer: COMMERCIAL

## 2025-05-20 ENCOUNTER — PATIENT MESSAGE (OUTPATIENT)
Age: 55
End: 2025-05-20

## 2025-05-20 VITALS
TEMPERATURE: 98 F | DIASTOLIC BLOOD PRESSURE: 72 MMHG | BODY MASS INDEX: 27.49 KG/M2 | RESPIRATION RATE: 18 BRPM | HEART RATE: 61 BPM | WEIGHT: 185.63 LBS | SYSTOLIC BLOOD PRESSURE: 118 MMHG | OXYGEN SATURATION: 99 % | HEIGHT: 69.02 IN

## 2025-05-20 DIAGNOSIS — D68.61 APS (ANTIPHOSPHOLIPID SYNDROME) (HCC): ICD-10-CM

## 2025-05-20 LAB
INR BLD: 3.73 (ref 0.8–1.2)
PROTHROMBIN TIME: 37.2 SECONDS (ref 11.6–14.8)

## 2025-05-20 RX ORDER — WARFARIN SODIUM 5 MG/1
TABLET ORAL
Qty: 90 TABLET | Refills: 2 | Status: SHIPPED | OUTPATIENT
Start: 2025-05-20

## 2025-05-20 NOTE — PROGRESS NOTES
[facility] Return Visit Note    Patient Name: Meliza Mckeon  Medical Record Number: [id number]    YOB: 1970   Date of Service: 25     Reason for Consultation/Chief Complaint:  elevated Beta 2 glycoprotein IgM antibodies, concerning for APS; cerebrovascular small vessel disease  Physician requesting consultation:  Demetris Leary MD    Interval History  TODAY 25  Patient is here for follow up of APS.  She started warfarin on 25.  She is now therapeutic on warfarin 7.5 mg /Thur/Saturday and 5 mg ///.  She notices that she bruises more easily than normal- but has no ecchymoses.  Denies rectal bleeding.  Last INR 25 was 2.69.    INR today 25 is 3.73.       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 Kept waiting.   Summer 2024: Neurologist seen from [facility]. MRI and CTA ordered (see results below). Nothing remarkable except  and slight osteophytic indentation left V5.    10/29/24: Evaluated by Dr. Leary (neurology). MRI brain reviewed- showing significant whit matter small vessel disease. \"in the distribution of the circumferential branches of the small vessel. Besides the usual cause of small vessel disease, these locations invoke possible microembolic or hemodynamic mechanisms.\"   12/3/24: Follow up with Dr. Leary. APS panel was checked. Showed positive Beta 2 glycoprotein Antibodies, IgM, with moderate titer of 52. Lupus anticoagulant negative.    25: Repeat APS panel confirmed antiphospholipid syndrome. Positive Beta 2 glycoprotein Antibodies, IgM, with moderate titer of 67. Lupus anticoagulant negative. 25: I called patient multiple times to relay that she has a confirmed diagnosis of APS and to give her  recommendations. As she was unreachable on phone, I sent her information on [facility] about her diagnosis and further recommendations. On 4/25/25 I advised to discontinue aspirin and start Warfarin 5 mg nightly, as warfarin is absolutely necessary. She was on vacation at the time- and opted to start warfarin as soon as she returned.  5/1/25: Video visit with patient. Reviewed in detail her diagnosis of APS and recommendation to start warfarin.  5/2/25: Patient Started warfarin 5 mg PO at bedtime  5/9/25: Uptitrated warfarin to 7.5 mg Tu/Thurs/Saturday and warfarin 5 mg M/W/Thurs/Sunday 5/20/25: Warfarin adjusted to 7.5 mg only on Saturday; Warfarin 5 mg nightly every other day of the week      Past Medical History:  Past Medical History[1]    Past Surgical History:  Past Surgical History[2]    Current Outpatient Medications:  Medications Ordered Prior to Encounter[3]    Allergies:   Allergies[4]    Family Medical History:  Family History[5]    Social History:  Social History     Social History Narrative    Not on file     Short Social Hx on File[6]    Review of Systems:  A 10-point ROS was done with pertinent positives and negatives per the HPI.     ECOG Performance Status: 0    Vital Signs:  Height: 175.3 cm (5' 9.02\") (05/20 1012)  Weight: 84.2 kg (185 lb 9.6 oz) (05/20 1012)  BSA (Calculated - sq m): 2 sq meters (05/20 1012)  Pulse: 61 (05/20 1012)  BP: 118/72 (05/20 1012)  Temp: 98.1 °F (36.7 °C) (05/20 1012)  Do Not Use - Resp Rate: --  SpO2: 99 % (05/20 1012)  Wt Readings from Last 6 Encounters:   05/20/25 84.2 kg (185 lb 9.6 oz)   04/22/25 83.2 kg (183 lb 8 oz)   12/03/24 80.3 kg (177 lb)   10/29/24 80.3 kg (177 lb)       Physical Examination:  General: Well-nourished and well-appearing. No acute distress.  Eyes: EOMI, bilateral conjunctiva normal. Sclera anicteric.  ENT: Oropharynx is clear. Mucous membranes moist.  Lymph Nodes: No cervical or supraclavicular lymphadenopathy.  Respiratory: Lungs clear to  auscultation bilaterally.  Cardiovascular: Regular rate and rhythm, no murmurs. No lower extremity edema.   GI: Soft, non-tender, non-distended with normoactive bowel sounds. No hepatosplenomegaly.  Heme: normal capillary refill. No ecchymosis, petechiae, or purpura.  Neurological: Alert and oriented. No apparent focal sensory or motor deficits  Skin: no rashes or lesions.  Psychiatric: Normal mood and affect.    Data Review  Laboratory Studies:  No results for input(s): \"WBC\", \"HGB\", \"HCT\", \"PLT\", \"MCV\", \"RDW\", \"NEPRELIM\" in the last 168 hours.  No results for input(s): \"NA\", \"K\", \"CL\", \"CO2\", \"BUN\", \"CREATSERUM\", \"GFRAA\", \"GFRNAA\", \"GLU\", \"CA\", \"PHOS\", \"TP\", \"ALB\", \"ALKPHO\", \"AST\", \"ALT\", \"BILT\" in the last 168 hours.    Invalid input(s): \"MAG\"  Recent Labs   Lab 05/13/25  1219   INR 2.69*       Radiographic Studies:  8/12/24 [facility]  MRI BRAIN W WO CONTRAST     HISTORY: Facial pain     TECHNIQUE: MRI of the brain was performed utilizing DWI, T2, T1, FLAIR and SWAN weighted axial as well as T1 weighted sagittal sequences.  Following the uneventful intravenous administration of 8.0 cc of gadavist, T2 weighted axial coronal sequences of brain were obtained.     COMPARISON:  CT brain dated 7/11/2024.     FINDINGS:   Ventricles and sulci are normal in size and configuration, for patient's age..     There are scattered foci of hyperintense T2/FLAIR signal within the white matter of both cerebral hemispheres, nonspecific but most compatible with mild chronic small vessel ischemic changes.  Demyelinating disease is not entirely excluded.      No cerebral edema or mass effect. No diffusion evidence of acute infarct. No evidence of parenchymal hemorrhage on susceptibility weighted imaging. No extra-axial collection.  No abnormal intracranial enhancement.     Cerebellar tonsils are normal in location. Major vessel flow voids are preserved at the skull base. Bone marrow signal is within normal limits.  The mastoids are normal  in signal.  Minimal mucosal thickening within ethmoid air cells.     Multilevel degenerative changes of the cervical spine, inadequately assessed on the localizer sequence.  Mild reversal of the cervical lordosis which may be positional in nature or secondary to muscular spasm.     CONCLUSION:    No acute intracranial abnormality.     Scattered foci of hyperintense T2/FLAIR signal within the white matter of both cerebral hemispheres, nonspecific but most compatible with mild chronic small vessel ischemic changes.  Demyelinating disease is not entirely excluded.     Please see above discussion for complete details.     7/11/24  CT Angiogram Carotid [facility]  FINDINGS:     CTA carotid:   Streak artifact due to dental hardware limits evaluation.     There is a normal three-vessel branching configuration off of the aortic arch.  No high-grade stenosis on the great vessel origins off the aortic arch.  Streak artifact renders assessment of the proximal left common carotid artery nondiagnostic..     No high-grade stenosis involving the right common carotid artery.  No hemodynamically significant stenosis involving the right internal carotid artery within the neck by NASCET criteria.  The right external carotid artery is patent.     No high-grade stenosis involving the left common carotid artery.  No hemodynamically significant stenosis involving the left internal carotid artery within the neck by NASCET criteria.  The left external carotid artery is patent.     No high-grade stenosis involving the right vertebral artery within the neck.     Streak artifact limits assessment of the V1 segment of the left vertebral artery.  There is short segment moderate stenosis involving the V2 segment of the left vertebral artery at level of C5, presumably secondary to regional mass effect from degenerative osteophytes.     No dissection of the internal carotid or vertebral arteries within the neck, with the limitations of above.     Other:    There is moderate reversal of the cervical lordosis, which may be positional in nature or secondary to muscular spasm.     Biapical pleural-parenchymal scarring.     CTA Brain:   Study is limited secondary to venous contamination.     There is patency of the anterior circulation including the bilateral intracranial internal carotid arteries, and the A1 and M1 segments of the anterior and middle cerebral arteries.     There is patency of the posterior circulation including the bilateral distal vertebral arteries, basilar artery, and P1 segments of the posterior cerebral arteries.     There is no proximal large vessel occlusion noted about the [place].     There is no aneurysm formation identified about the [place].  Please note CTA is less sensitive for aneurysms less than 3 mm in size.       The ventricles and sulci are normal in size and configuration for patient's age.  No CT evidence of acute large territorial infarct.  No cerebral edema or mass effect.  Enhancement of the dural venous sinuses is within normal limits.     CONCLUSION:    No dissection or hemodynamically significant stenosis of the internal carotid or vertebral arteries within the neck, within the limitations of above.     Short segment moderate stenosis involving the V2 segment of the left vertebral artery at level of C5, presumably secondary to regional mass effect from degenerative osteophytes.     No proximal large vessel occlusion noted about the [place].  No aneurysm formation noted about the [place].     Please see above discussion for complete details.       Impression/Recommendations: Ms. Mckeon is a pleasant 54 year old F with PMHx of cerebrovascular small vessel disease (diagnosed July 2024) and Alport syndrome who presents to hematology/oncology today for evaluation and treatment recommendations of elevated Beta 2 glycoprotein IgM antibodies, concerning for antiphospholipid syndrome (APS).        1.  Antiphospholipid syndrome, moderate-risk profile  Patient has two persistently positive anti-beta2 GPI antibody titers (IgM) greater than 40 units -- at least 12 weeks apart -- , along with unexplained cerebrovascular event (small vessel disease due to arterial thrombosis) in July 2024 -- fitting the criteria for APS.     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               Factor II prothrombin gene mutation: normal   Antithrombin III: 107 (Normal)  Protein C activity: 171 (wnl)   Protein S activity: 115 (wnl)               Act Protein C resistance: 1.9 (slightly low, can be other risk factor for thrombosis)               Quant immunoglobulins: normal                SPEP: No monoclonal protein               Iron studies (iron/TIBC, ferritin, soluble transferrin receptor):  ferritin 69, B12 645, folate 36, iron saturation 17%     - given that patient is young and had a clinical cerebrovascular event that would be considered arterial thrombosis, she requires long-term (life-long) anticoagulation with warfarin (INR goal 2-3).This is for secondary thrombosis prevention.      - I reviewed in great detail and with clarity that patient has confirmed diagnosis of antiphospholipid syndrome, and that all my recommendations are based on evidence-based guidelines.     She started warfarin on 5/2/25. Discontinued aspirin 81 mg, as advised.     TODAY 5/20/25  Patient is here for follow up of APS.  INR today 5/20/25 is 3.73.   She notices that she bruises more easily than normal- but has no ecchymoses.  Denies rectal bleeding.       Recommendations:  - continue warfarin 5 mg nightly on M/TW/Thurs/F/Sunday; take warfarin 7.5 mg on Saturday  - Repeat labs in 1  week. Goal INR 2-3.     - rheumatology evaluation scheduled for summer 2025     - follow up with neurology scheduled for summer 2025. I did update Dr. Leary regarding my recommendations  Patient endorses balance issues for about 1 decade and that she has lost her balance at her son's graduation - despite any focal deficits. Recommended follow up with neurology for this as well.          Follow Up:  INR to be checked in 1 weeks. RTC in 3 months for labs (CBC with diff, INR) and MD visit.     Linnette Palmer D.O.  [facility]      Note to patient: The 21 Century Cures Act makes medical notes like these available to patients in the interest of transparency. However, be advised this is a medical document. It is intended as peer to peer communication. It is written in medical language and may contain abbreviations or verbiage that are unfamiliar. It may appear blunt or direct. Medical documents are intended to carry relevant information, facts as evident, and the clinical opinion of the practitioner.       In reviewing this note, please be advised that Dragon Voice Recognition software used to dictate the note may have made errors in recognizing some of the words or phrases.          [1]   Past Medical History:   Alport syndrome (HCC)   [2]   Past Surgical History:  Procedure Laterality Date    Breast lumpectomy Right    [3]   Current Outpatient Medications on File Prior to Visit   Medication Sig Dispense Refill    warfarin 5 MG Oral Tab Take 1 tablet (5 mg total) by mouth nightly. 30 tablet 0    ATENOLOL OR Take 25 mg by mouth in the morning.      cyclobenzaprine 5 MG Oral Tab Take 1 tablet (5 mg total) by mouth nightly as needed.      Multiple Vitamin (ONE-DAILY MULTI VITAMINS) Oral Tab Take 1 tablet by mouth in the morning.      Cholecalciferol (VITAMIN D) 50 MCG (2000 UT) Oral Tab Take by mouth.      Omega-3 1000 MG Oral Cap Take by mouth. (Patient not taking: Reported on  5/20/2025)      magnesium oxide 400 MG Oral Tab Take 1 tablet (400 mg total) by mouth daily. (Patient not taking: Reported on 12/3/2024)       No current facility-administered medications on file prior to visit.   [4]   Allergies  Allergen Reactions    Sulfa Antibiotics RASH     HIVES    Nsaids OTHER (SEE COMMENTS)     Kidney disease   [5]   Family History  Problem Relation Age of Onset    Stroke Maternal Grandmother     Stroke Maternal Aunt    [6]   Social History  Socioeconomic History    Marital status:    Tobacco Use    Smoking status: Never     Passive exposure: Never    Smokeless tobacco: Never   Substance and Sexual Activity    Alcohol use: Yes     Comment: rare    Drug use: Never   Other Topics Concern    Caffeine Concern Yes     Comment: ~6 cups tea daily    Exercise Yes     Comment: 5-6 days weekly     Social Drivers of Health     Food Insecurity: Low Risk  (10/9/2024)    Received from [facility]    Food Insecurity     Within the past 12 months, you worried that your food would run out before you got money to buy more.  : Never true     Within the past 12 months, the food you bought just didn't last and you didn't have money to get more. : Never true   Transportation Needs: Not At Risk (10/9/2024)    Received from [facility]    Transportation Needs     In the past 12 months, has lack of reliable transportation kept you from medical appointments, meetings, work or from getting things needed for daily living? : No

## 2025-05-20 NOTE — PROGRESS NOTES
Education Record    Learner:  Patient    Disease / Diagnosis: APS    Barriers / Limitations:  None   Comments:    Method:  Brief focused and Reinforcement   Comments:    General Topics:  Diet, Medication, Side effects and symptom management, and Plan of care reviewed   Comments:    Outcome:  Shows understanding   Comments:    Patient here for follow up. She's taking Coumadin 7.5mg Tues/Thurs/Sat, 5mg the rest of the week as directed. Denies bleeding. Reports easy bruising and occasional chills since starting Coumadin. Requesting Rx for Coumadin to be sent to [facility] mail order pharmacy.

## 2025-05-23 ENCOUNTER — RESULTS FOLLOW-UP (OUTPATIENT)
Dept: NEPHROLOGY | Age: 55
End: 2025-05-23

## 2025-05-27 ENCOUNTER — LAB ENCOUNTER (OUTPATIENT)
Dept: LAB | Age: 55
End: 2025-05-27
Attending: INTERNAL MEDICINE
Payer: COMMERCIAL

## 2025-05-27 DIAGNOSIS — D68.61 APS (ANTIPHOSPHOLIPID SYNDROME) (HCC): ICD-10-CM

## 2025-05-27 LAB
INR BLD: 4.86 (ref 0.8–1.2)
PROTHROMBIN TIME: 45.8 SECONDS (ref 11.6–14.8)

## 2025-05-27 PROCEDURE — 85610 PROTHROMBIN TIME: CPT

## 2025-05-27 PROCEDURE — 36415 COLL VENOUS BLD VENIPUNCTURE: CPT

## 2025-05-28 ENCOUNTER — ANTI-COAG VISIT (OUTPATIENT)
Age: 55
End: 2025-05-28

## 2025-05-28 DIAGNOSIS — D68.61 APS (ANTIPHOSPHOLIPID SYNDROME) (HCC): Primary | ICD-10-CM

## 2025-06-02 ENCOUNTER — ANTI-COAG VISIT (OUTPATIENT)
Age: 55
End: 2025-06-02

## 2025-06-02 ENCOUNTER — LAB ENCOUNTER (OUTPATIENT)
Dept: LAB | Age: 55
End: 2025-06-02
Attending: INTERNAL MEDICINE
Payer: COMMERCIAL

## 2025-06-02 DIAGNOSIS — D68.61 APS (ANTIPHOSPHOLIPID SYNDROME) (HCC): Primary | ICD-10-CM

## 2025-06-02 DIAGNOSIS — D68.61 APS (ANTIPHOSPHOLIPID SYNDROME) (HCC): ICD-10-CM

## 2025-06-02 LAB
INR BLD: 2.18 (ref 0.8–1.2)
PROTHROMBIN TIME: 24.5 SECONDS (ref 11.6–14.8)

## 2025-06-02 PROCEDURE — 36415 COLL VENOUS BLD VENIPUNCTURE: CPT

## 2025-06-02 PROCEDURE — 85610 PROTHROMBIN TIME: CPT

## 2025-06-08 ASSESSMENT — RHEUMATOLOGY NEW PATIENT QUESTIONNAIRE
SKIN TIGHTNESS: N
LOSS OF VISION: N
EYE PAIN: N
INCREASED SUSCEPTIBILITY TO INFECTION: N
COLOR CHANGES OF HANDS OR FEET IN THE COLD: N
DEPRESSION: N
PERSISTENT DIARRHEA: N
MUSCLE WEAKNESS: N
BEHAVIORAL CHANGES: N
SWOLLEN LEGS OR FEET: N
RASH OR ULCERS: N
RASH: N
NAUSEA: N
ANEMIA: N
UNUSUALLY RAPID OR SLOWED HEART RATE: N
EYE REDNESS: N
HOARSE VOICE: N
VAGINAL DRYNESS: N
SEIZURES: N
FAINTING: N
JAUNDICE: N
MEMORY LOSS: N
STOMACH PAIN: N
DIFFICULTY SWALLOWING: N
DRYNESS OF MOUTH: N
HEADACHES: N
SUN SENSITIVE (SUN ALLERGY): N
UNEXPLAINED WEIGHT CHANGE: N
SHORTNESS OF BREATH: N
UNEXPLAINED HEARING LOSS: N
SKIN REDNESS: N
UNUSUAL FATIGUE: Y
ABNORMAL URINE: N
BLOOD IN STOOLS: N
MORNING STIFFNESS: N
PAIN OR BURNING ON URINATION: N
EASILY LOSING TEMPER: Y
HEARTBURN OR REFLUX: N
VOMITING OF BLOOD OR COFFEE GROUND CONSISTENCY MATERIAL: N
JOINT PAIN: Y
JOINT SWELLING: Y
ANXIETY: Y
LOSS OF CONSCIOUSNESS: N
EXCESSIVE HAIR LOSS (MORE THAN YOUR NORM): N
BLACK STOOLS: N
NODULES/BUMPS: N
EASY BRUISING: Y
DOUBLE OR BLURRED VISION: N
SWOLLEN OR TENDER GLANDS: N
DIFFICULTY STAYING ASLEEP: N
CHEST PAIN: N
LIST JOINTS AFFECTED BY SWELLING IN THE PAST MONTH: FINGERS
NUMBNESS OR TINGLING IN HANDS OR FEET: N
NIGHT SWEATS: N
UNUSUAL BLEEDING: N
DIFFICULTY FALLING ASLEEP: Y
AGITATION: Y
SORES IN MOUTH OR NOSE: Y
EYE DRYNESS: N
FEVER: N
COUGH: N
DIFFICULTY BREATHING LYING DOWN: N

## 2025-06-09 ENCOUNTER — ANTI-COAG VISIT (OUTPATIENT)
Age: 55
End: 2025-06-09

## 2025-06-09 ENCOUNTER — LAB ENCOUNTER (OUTPATIENT)
Dept: LAB | Age: 55
End: 2025-06-09
Attending: INTERNAL MEDICINE
Payer: COMMERCIAL

## 2025-06-09 DIAGNOSIS — D68.61 APS (ANTIPHOSPHOLIPID SYNDROME) (HCC): Primary | ICD-10-CM

## 2025-06-09 DIAGNOSIS — D68.61 APS (ANTIPHOSPHOLIPID SYNDROME) (HCC): ICD-10-CM

## 2025-06-09 LAB
INR BLD: 2.59 (ref 0.8–1.2)
PROTHROMBIN TIME: 27.9 SECONDS (ref 11.6–14.8)

## 2025-06-09 PROCEDURE — 85610 PROTHROMBIN TIME: CPT

## 2025-06-09 PROCEDURE — 36415 COLL VENOUS BLD VENIPUNCTURE: CPT

## 2025-06-11 ENCOUNTER — E-ADVICE (OUTPATIENT)
Dept: NEPHROLOGY | Age: 55
End: 2025-06-11

## 2025-06-11 ENCOUNTER — APPOINTMENT (OUTPATIENT)
Dept: RHEUMATOLOGY | Age: 55
End: 2025-06-11

## 2025-06-11 ENCOUNTER — LAB SERVICES (OUTPATIENT)
Dept: LAB | Age: 55
End: 2025-06-11

## 2025-06-11 VITALS
BODY MASS INDEX: 27.17 KG/M2 | SYSTOLIC BLOOD PRESSURE: 122 MMHG | HEART RATE: 68 BPM | WEIGHT: 184 LBS | DIASTOLIC BLOOD PRESSURE: 70 MMHG

## 2025-06-11 DIAGNOSIS — D68.61 ANTI-PHOSPHOLIPID SYNDROME  (CMD): Primary | ICD-10-CM

## 2025-06-11 DIAGNOSIS — M19.041 PRIMARY OSTEOARTHRITIS OF BOTH HANDS: ICD-10-CM

## 2025-06-11 DIAGNOSIS — M19.042 PRIMARY OSTEOARTHRITIS OF BOTH HANDS: ICD-10-CM

## 2025-06-11 PROCEDURE — G2211 COMPLEX E/M VISIT ADD ON: HCPCS | Performed by: INTERNAL MEDICINE

## 2025-06-11 PROCEDURE — 99205 OFFICE O/P NEW HI 60 MIN: CPT | Performed by: INTERNAL MEDICINE

## 2025-06-12 DIAGNOSIS — N18.30 STAGE 3 CHRONIC KIDNEY DISEASE, UNSPECIFIED WHETHER STAGE 3A OR 3B CKD  (CMD): ICD-10-CM

## 2025-06-12 RX ORDER — ATENOLOL 25 MG/1
25 TABLET ORAL DAILY
Qty: 90 TABLET | Refills: 0 | Status: SHIPPED | OUTPATIENT
Start: 2025-06-12

## 2025-06-16 ENCOUNTER — LAB SERVICES (OUTPATIENT)
Dept: LAB | Age: 55
End: 2025-06-16

## 2025-06-16 DIAGNOSIS — Q87.81 ALPORT'S SYNDROME (CMD): ICD-10-CM

## 2025-06-16 LAB
CREAT UR-MCNC: 59.9 MG/DL
MICROALBUMIN UR-MCNC: <0.5 MG/DL
MICROALBUMIN/CREAT UR: NORMAL MG/G{CREAT}

## 2025-06-16 PROCEDURE — 82570 ASSAY OF URINE CREATININE: CPT | Performed by: INTERNAL MEDICINE

## 2025-06-16 PROCEDURE — 82043 UR ALBUMIN QUANTITATIVE: CPT | Performed by: INTERNAL MEDICINE

## 2025-06-25 ENCOUNTER — APPOINTMENT (OUTPATIENT)
Dept: DERMATOLOGY | Age: 55
End: 2025-06-25

## 2025-06-26 ENCOUNTER — LAB ENCOUNTER (OUTPATIENT)
Dept: LAB | Age: 55
End: 2025-06-26
Attending: INTERNAL MEDICINE
Payer: COMMERCIAL

## 2025-06-26 DIAGNOSIS — D68.61 APS (ANTIPHOSPHOLIPID SYNDROME) (HCC): ICD-10-CM

## 2025-06-26 LAB
INR BLD: 2.68 (ref 0.8–1.2)
PROTHROMBIN TIME: 28.7 SECONDS (ref 11.6–14.8)

## 2025-06-26 PROCEDURE — 85610 PROTHROMBIN TIME: CPT

## 2025-06-26 PROCEDURE — 36415 COLL VENOUS BLD VENIPUNCTURE: CPT

## 2025-06-27 ENCOUNTER — ANTI-COAG VISIT (OUTPATIENT)
Age: 55
End: 2025-06-27

## 2025-06-27 DIAGNOSIS — D68.61 APS (ANTIPHOSPHOLIPID SYNDROME) (HCC): Primary | ICD-10-CM

## 2025-07-01 ENCOUNTER — APPOINTMENT (OUTPATIENT)
Dept: RHEUMATOLOGY | Age: 55
End: 2025-07-01

## 2025-07-10 ENCOUNTER — APPOINTMENT (OUTPATIENT)
Dept: FAMILY MEDICINE | Age: 55
End: 2025-07-10

## 2025-07-10 ENCOUNTER — LAB SERVICES (OUTPATIENT)
Dept: LAB | Age: 55
End: 2025-07-10

## 2025-07-10 VITALS
SYSTOLIC BLOOD PRESSURE: 112 MMHG | WEIGHT: 182.2 LBS | HEIGHT: 69 IN | DIASTOLIC BLOOD PRESSURE: 73 MMHG | HEART RATE: 80 BPM | TEMPERATURE: 97.6 F | RESPIRATION RATE: 18 BRPM | BODY MASS INDEX: 26.98 KG/M2 | OXYGEN SATURATION: 100 %

## 2025-07-10 DIAGNOSIS — Z01.419 WELL WOMAN EXAM: ICD-10-CM

## 2025-07-10 DIAGNOSIS — R53.82 CHRONIC FATIGUE: ICD-10-CM

## 2025-07-10 DIAGNOSIS — Z01.419 WELL WOMAN EXAM: Primary | ICD-10-CM

## 2025-07-10 DIAGNOSIS — E55.9 VITAMIN D DEFICIENCY: ICD-10-CM

## 2025-07-10 DIAGNOSIS — R51.9 DAILY HEADACHE: ICD-10-CM

## 2025-07-10 LAB
25(OH)D3+25(OH)D2 SERPL-MCNC: 37 NG/ML (ref 30–100)
ALBUMIN SERPL-MCNC: 4.2 G/DL (ref 3.4–5)
ALBUMIN/GLOB SERPL: 1.3 {RATIO} (ref 1–2.4)
ALP SERPL-CCNC: 82 UNITS/L (ref 45–117)
ALT SERPL-CCNC: 37 UNITS/L
ANION GAP SERPL CALC-SCNC: 16 MMOL/L (ref 7–19)
AST SERPL-CCNC: 28 UNITS/L
BASOPHILS # BLD: 0 K/MCL (ref 0–0.3)
BASOPHILS NFR BLD: 1 %
BILIRUB SERPL-MCNC: 0.5 MG/DL (ref 0.2–1)
BUN SERPL-MCNC: 14 MG/DL (ref 6–20)
BUN/CREAT SERPL: 18 (ref 7–25)
CALCIUM SERPL-MCNC: 9.4 MG/DL (ref 8.4–10.2)
CHLORIDE SERPL-SCNC: 104 MMOL/L (ref 97–110)
CHOLEST SERPL-MCNC: 221 MG/DL
CHOLEST/HDLC SERPL: 2.3 {RATIO}
CO2 SERPL-SCNC: 29 MMOL/L (ref 21–32)
CREAT SERPL-MCNC: 0.79 MG/DL (ref 0.51–0.95)
DEPRECATED RDW RBC: 49.6 FL (ref 39–50)
EGFRCR SERPLBLD CKD-EPI 2021: 88 ML/MIN/{1.73_M2}
EOSINOPHIL # BLD: 0.1 K/MCL (ref 0–0.5)
EOSINOPHIL NFR BLD: 1 %
ERYTHROCYTE [DISTWIDTH] IN BLOOD: 14.1 % (ref 11–15)
FASTING DURATION TIME PATIENT: ABNORMAL H
GLOBULIN SER-MCNC: 3.3 G/DL (ref 2–4)
GLUCOSE SERPL-MCNC: 101 MG/DL (ref 70–99)
HBA1C MFR BLD: 5.6 % (ref 4.5–5.6)
HCT VFR BLD CALC: 40.7 % (ref 36–46.5)
HDLC SERPL-MCNC: 97 MG/DL
HGB BLD-MCNC: 13.3 G/DL (ref 12–15.5)
IMM GRANULOCYTES # BLD AUTO: 0 K/MCL (ref 0–0.2)
IMM GRANULOCYTES # BLD: 0 %
LDLC SERPL CALC-MCNC: 110 MG/DL
LYMPHOCYTES # BLD: 1.6 K/MCL (ref 1–4)
LYMPHOCYTES NFR BLD: 30 %
MCH RBC QN AUTO: 31.4 PG (ref 26–34)
MCHC RBC AUTO-ENTMCNC: 32.7 G/DL (ref 32–36.5)
MCV RBC AUTO: 96.2 FL (ref 78–100)
MONOCYTES # BLD: 0.4 K/MCL (ref 0.3–0.9)
MONOCYTES NFR BLD: 7 %
NEUTROPHILS # BLD: 3.3 K/MCL (ref 1.8–7.7)
NEUTROPHILS NFR BLD: 61 %
NONHDLC SERPL-MCNC: 124 MG/DL
NRBC BLD MANUAL-RTO: 0 /100 WBC
PLATELET # BLD AUTO: 240 K/MCL (ref 140–450)
POTASSIUM SERPL-SCNC: 4.7 MMOL/L (ref 3.4–5.1)
PROT SERPL-MCNC: 7.5 G/DL (ref 6.4–8.2)
RBC # BLD: 4.23 MIL/MCL (ref 4–5.2)
SODIUM SERPL-SCNC: 144 MMOL/L (ref 135–145)
TRIGL SERPL-MCNC: 71 MG/DL
TSH SERPL-ACNC: 1.71 MCUNITS/ML (ref 0.35–5)
WBC # BLD: 5.4 K/MCL (ref 4.2–11)

## 2025-07-10 PROCEDURE — 99396 PREV VISIT EST AGE 40-64: CPT | Performed by: FAMILY MEDICINE

## 2025-07-10 ASSESSMENT — PATIENT HEALTH QUESTIONNAIRE - PHQ9
1. LITTLE INTEREST OR PLEASURE IN DOING THINGS: NOT AT ALL
SUM OF ALL RESPONSES TO PHQ9 QUESTIONS 1 AND 2: 0
2. FEELING DOWN, DEPRESSED OR HOPELESS: NOT AT ALL
SUM OF ALL RESPONSES TO PHQ9 QUESTIONS 1 AND 2: 0
CLINICAL INTERPRETATION OF PHQ2 SCORE: NO FURTHER SCREENING NEEDED

## 2025-07-14 LAB
ANA PAT SER IF-IMP: ABNORMAL
ANA TITR SER IF: ABNORMAL {TITER}

## 2025-07-21 ENCOUNTER — TELEPHONE (OUTPATIENT)
Dept: RHEUMATOLOGY | Age: 55
End: 2025-07-21

## 2025-07-21 ENCOUNTER — E-ADVICE (OUTPATIENT)
Dept: FAMILY MEDICINE | Age: 55
End: 2025-07-21

## 2025-07-21 ENCOUNTER — TELEPHONE (OUTPATIENT)
Dept: FAMILY MEDICINE | Age: 55
End: 2025-07-21

## 2025-07-22 ENCOUNTER — HOSPITAL ENCOUNTER (OUTPATIENT)
Dept: MRI IMAGING | Facility: HOSPITAL | Age: 55
Discharge: HOME | End: 2025-07-22
Attending: Other
Payer: COMMERCIAL

## 2025-07-22 DIAGNOSIS — I67.9 SMALL VESSEL DISEASE, CEREBROVASCULAR: ICD-10-CM

## 2025-07-22 DIAGNOSIS — I63.19 CEREBROVASCULAR ACCIDENT (CVA) DUE TO EMBOLISM OF OTHER PRECEREBRAL ARTERY (HCC): ICD-10-CM

## 2025-07-22 PROCEDURE — A9575 INJ GADOTERATE MEGLUMI 0.1ML: HCPCS | Performed by: OTHER

## 2025-07-22 PROCEDURE — 70553 MRI BRAIN STEM W/O & W/DYE: CPT | Performed by: OTHER

## 2025-07-22 RX ORDER — GADOTERATE MEGLUMINE 376.9 MG/ML
20 INJECTION INTRAVENOUS
Status: COMPLETED | OUTPATIENT
Start: 2025-07-22 | End: 2025-07-22

## 2025-07-22 RX ADMIN — GADOTERATE MEGLUMINE 17 ML: 376.9 INJECTION INTRAVENOUS at 16:52:00

## 2025-07-24 ENCOUNTER — LAB ENCOUNTER (OUTPATIENT)
Dept: LAB | Age: 55
End: 2025-07-24
Attending: INTERNAL MEDICINE
Payer: COMMERCIAL

## 2025-07-24 ENCOUNTER — ANTI-COAG VISIT (OUTPATIENT)
Facility: LOCATION | Age: 55
End: 2025-07-24

## 2025-07-24 ENCOUNTER — TELEPHONE (OUTPATIENT)
Dept: SURGERY | Age: 55
End: 2025-07-24

## 2025-07-24 DIAGNOSIS — D68.61 APS (ANTIPHOSPHOLIPID SYNDROME) (HCC): Primary | ICD-10-CM

## 2025-07-24 DIAGNOSIS — D68.61 APS (ANTIPHOSPHOLIPID SYNDROME) (HCC): ICD-10-CM

## 2025-07-24 LAB
INR BLD: 3.18 (ref 0.8–1.2)
PROTHROMBIN TIME: 32.8 SECONDS (ref 11.6–14.8)

## 2025-07-24 PROCEDURE — 36415 COLL VENOUS BLD VENIPUNCTURE: CPT

## 2025-07-24 PROCEDURE — 85610 PROTHROMBIN TIME: CPT

## 2025-07-30 ENCOUNTER — APPOINTMENT (OUTPATIENT)
Dept: DERMATOLOGY | Age: 55
End: 2025-07-30

## 2025-07-31 ENCOUNTER — LAB ENCOUNTER (OUTPATIENT)
Dept: LAB | Age: 55
End: 2025-07-31
Attending: INTERNAL MEDICINE

## 2025-07-31 DIAGNOSIS — D68.61 APS (ANTIPHOSPHOLIPID SYNDROME) (HCC): ICD-10-CM

## 2025-07-31 LAB
BASOPHILS # BLD AUTO: 0.03 X10(3) UL (ref 0–0.2)
BASOPHILS NFR BLD AUTO: 0.6 %
EOSINOPHIL # BLD AUTO: 0.04 X10(3) UL (ref 0–0.7)
EOSINOPHIL NFR BLD AUTO: 0.8 %
ERYTHROCYTE [DISTWIDTH] IN BLOOD BY AUTOMATED COUNT: 13.7 %
HCT VFR BLD AUTO: 39.7 % (ref 35–48)
HGB BLD-MCNC: 13 G/DL (ref 12–16)
IMM GRANULOCYTES # BLD AUTO: 0.01 X10(3) UL (ref 0–1)
IMM GRANULOCYTES NFR BLD: 0.2 %
INR BLD: 3.26 (ref 0.8–1.2)
LYMPHOCYTES # BLD AUTO: 1.99 X10(3) UL (ref 1–4)
LYMPHOCYTES NFR BLD AUTO: 38.6 %
MCH RBC QN AUTO: 31 PG (ref 26–34)
MCHC RBC AUTO-ENTMCNC: 32.7 G/DL (ref 31–37)
MCV RBC AUTO: 94.5 FL (ref 80–100)
MONOCYTES # BLD AUTO: 0.39 X10(3) UL (ref 0.1–1)
MONOCYTES NFR BLD AUTO: 7.6 %
NEUTROPHILS # BLD AUTO: 2.7 X10 (3) UL (ref 1.5–7.7)
NEUTROPHILS # BLD AUTO: 2.7 X10(3) UL (ref 1.5–7.7)
NEUTROPHILS NFR BLD AUTO: 52.2 %
PLATELET # BLD AUTO: 223 10(3)UL (ref 150–450)
PROTHROMBIN TIME: 33.5 SECONDS (ref 11.6–14.8)
RBC # BLD AUTO: 4.2 X10(6)UL (ref 3.8–5.3)
WBC # BLD AUTO: 5.2 X10(3) UL (ref 4–11)

## 2025-07-31 PROCEDURE — 36415 COLL VENOUS BLD VENIPUNCTURE: CPT

## 2025-07-31 PROCEDURE — 85610 PROTHROMBIN TIME: CPT

## 2025-07-31 PROCEDURE — 85025 COMPLETE CBC W/AUTO DIFF WBC: CPT

## 2025-08-01 ENCOUNTER — ANTI-COAG VISIT (OUTPATIENT)
Facility: LOCATION | Age: 55
End: 2025-08-01

## 2025-08-01 DIAGNOSIS — D68.61 APS (ANTIPHOSPHOLIPID SYNDROME) (HCC): Primary | ICD-10-CM

## 2025-08-07 ENCOUNTER — LAB ENCOUNTER (OUTPATIENT)
Dept: LAB | Age: 55
End: 2025-08-07
Attending: INTERNAL MEDICINE

## 2025-08-07 DIAGNOSIS — D68.61 APS (ANTIPHOSPHOLIPID SYNDROME) (HCC): ICD-10-CM

## 2025-08-07 LAB
INR BLD: 2.65 (ref 0.8–1.2)
PROTHROMBIN TIME: 28.5 SECONDS (ref 11.6–14.8)

## 2025-08-07 PROCEDURE — 36415 COLL VENOUS BLD VENIPUNCTURE: CPT

## 2025-08-07 PROCEDURE — 85610 PROTHROMBIN TIME: CPT

## 2025-08-08 ENCOUNTER — ANTI-COAG VISIT (OUTPATIENT)
Facility: LOCATION | Age: 55
End: 2025-08-08

## 2025-08-08 DIAGNOSIS — D68.61 APS (ANTIPHOSPHOLIPID SYNDROME) (HCC): Primary | ICD-10-CM

## 2025-08-19 DIAGNOSIS — N18.30 STAGE 3 CHRONIC KIDNEY DISEASE, UNSPECIFIED WHETHER STAGE 3A OR 3B CKD  (CMD): ICD-10-CM

## 2025-08-19 RX ORDER — ATENOLOL 25 MG/1
25 TABLET ORAL DAILY
Qty: 90 TABLET | Refills: 1 | Status: SHIPPED | OUTPATIENT
Start: 2025-08-19

## 2025-08-20 ENCOUNTER — ANTI-COAG VISIT (OUTPATIENT)
Facility: LOCATION | Age: 55
End: 2025-08-20

## 2025-08-20 ENCOUNTER — OFFICE VISIT (OUTPATIENT)
Facility: LOCATION | Age: 55
End: 2025-08-20
Attending: INTERNAL MEDICINE

## 2025-08-20 VITALS
HEIGHT: 69.02 IN | OXYGEN SATURATION: 100 % | DIASTOLIC BLOOD PRESSURE: 77 MMHG | BODY MASS INDEX: 27.08 KG/M2 | TEMPERATURE: 98 F | SYSTOLIC BLOOD PRESSURE: 123 MMHG | HEART RATE: 69 BPM | RESPIRATION RATE: 16 BRPM | WEIGHT: 182.81 LBS

## 2025-08-20 DIAGNOSIS — D68.61 APS (ANTIPHOSPHOLIPID SYNDROME) (HCC): Primary | ICD-10-CM

## 2025-08-20 LAB
BASOPHILS # BLD AUTO: 0.05 X10(3) UL (ref 0–0.2)
BASOPHILS NFR BLD AUTO: 1.1 %
EOSINOPHIL # BLD AUTO: 0.08 X10(3) UL (ref 0–0.7)
EOSINOPHIL NFR BLD AUTO: 1.8 %
ERYTHROCYTE [DISTWIDTH] IN BLOOD BY AUTOMATED COUNT: 13.8 %
HCT VFR BLD AUTO: 38.6 % (ref 35–48)
HGB BLD-MCNC: 12.9 G/DL (ref 12–16)
IMM GRANULOCYTES # BLD AUTO: 0.01 X10(3) UL (ref 0–1)
IMM GRANULOCYTES NFR BLD: 0.2 %
INR BLD: 2.9 (ref 0.8–1.2)
LYMPHOCYTES # BLD AUTO: 1.66 X10(3) UL (ref 1–4)
LYMPHOCYTES NFR BLD AUTO: 38.1 %
MCH RBC QN AUTO: 31.4 PG (ref 26–34)
MCHC RBC AUTO-ENTMCNC: 33.4 G/DL (ref 31–37)
MCV RBC AUTO: 93.9 FL (ref 80–100)
MONOCYTES # BLD AUTO: 0.33 X10(3) UL (ref 0.1–1)
MONOCYTES NFR BLD AUTO: 7.6 %
NEUTROPHILS # BLD AUTO: 2.23 X10 (3) UL (ref 1.5–7.7)
NEUTROPHILS # BLD AUTO: 2.23 X10(3) UL (ref 1.5–7.7)
NEUTROPHILS NFR BLD AUTO: 51.2 %
PLATELET # BLD AUTO: 239 10(3)UL (ref 150–450)
PROTHROMBIN TIME: 30.3 SECONDS (ref 11.6–14.8)
RBC # BLD AUTO: 4.11 X10(6)UL (ref 3.8–5.3)
WBC # BLD AUTO: 4.4 X10(3) UL (ref 4–11)

## 2025-08-22 DIAGNOSIS — N18.30 STAGE 3 CHRONIC KIDNEY DISEASE, UNSPECIFIED WHETHER STAGE 3A OR 3B CKD  (CMD): ICD-10-CM

## 2025-08-22 RX ORDER — ATENOLOL 25 MG/1
25 TABLET ORAL DAILY
Qty: 90 TABLET | Refills: 1 | OUTPATIENT
Start: 2025-08-22

## 2025-08-23 RX ORDER — CYCLOBENZAPRINE HCL 5 MG
5 TABLET ORAL
Qty: 30 TABLET | Refills: 0 | Status: SHIPPED | OUTPATIENT
Start: 2025-08-23

## 2025-08-25 ENCOUNTER — CLINICAL ABSTRACT (OUTPATIENT)
Dept: HEALTH INFORMATION MANAGEMENT | Facility: OTHER | Age: 55
End: 2025-08-25

## 2025-08-26 ENCOUNTER — APPOINTMENT (OUTPATIENT)
Dept: MAMMOGRAPHY | Age: 55
End: 2025-08-26
Attending: PHYSICIAN ASSISTANT

## 2025-08-26 ENCOUNTER — LAB SERVICES (OUTPATIENT)
Dept: LAB | Age: 55
End: 2025-08-26

## 2025-08-26 DIAGNOSIS — D68.61 ANTI-PHOSPHOLIPID SYNDROME  (CMD): ICD-10-CM

## 2025-08-26 DIAGNOSIS — Z12.31 VISIT FOR SCREENING MAMMOGRAM: ICD-10-CM

## 2025-08-26 LAB
C3 SERPL-MCNC: 142 MG/DL (ref 90–180)
C4 SERPL-MCNC: 31.4 MG/DL (ref 10–40)

## 2025-08-26 PROCEDURE — 77067 SCR MAMMO BI INCL CAD: CPT | Performed by: RADIOLOGY

## 2025-08-26 PROCEDURE — 36415 COLL VENOUS BLD VENIPUNCTURE: CPT | Performed by: INTERNAL MEDICINE

## 2025-08-26 PROCEDURE — 77063 BREAST TOMOSYNTHESIS BI: CPT | Performed by: RADIOLOGY

## 2025-08-27 LAB
DSDNA IGG SERPL IA-ACNC: 1 IU/ML
NUCLEAR IGG SER IA-RTO: 0.5 RATIO
U1 SNRNP IGG SER IA-ACNC: <1 U/ML

## 2025-09-24 ENCOUNTER — APPOINTMENT (OUTPATIENT)
Dept: NEPHROLOGY | Age: 55
End: 2025-09-24

## 2025-10-30 ENCOUNTER — APPOINTMENT (OUTPATIENT)
Dept: DERMATOLOGY | Age: 55
End: 2025-10-30

## 2025-12-11 ENCOUNTER — APPOINTMENT (OUTPATIENT)
Dept: RHEUMATOLOGY | Age: 55
End: 2025-12-11

## 2026-04-15 ENCOUNTER — APPOINTMENT (OUTPATIENT)
Dept: OBGYN | Age: 56
End: 2026-04-15

## 2026-05-13 ENCOUNTER — APPOINTMENT (OUTPATIENT)
Dept: NEPHROLOGY | Age: 56
End: 2026-05-13